# Patient Record
Sex: MALE | Race: BLACK OR AFRICAN AMERICAN | Employment: OTHER | URBAN - METROPOLITAN AREA
[De-identification: names, ages, dates, MRNs, and addresses within clinical notes are randomized per-mention and may not be internally consistent; named-entity substitution may affect disease eponyms.]

---

## 2017-02-03 ENCOUNTER — HOSPITAL ENCOUNTER (OUTPATIENT)
Dept: GENERAL RADIOLOGY | Age: 77
Discharge: HOME | End: 2017-02-03
Payer: COMMERCIAL

## 2017-02-03 DIAGNOSIS — R06.02 SHORTNESS OF BREATH: ICD-10-CM

## 2017-02-03 PROCEDURE — 71020 XR CHEST PA LAT: CPT

## 2017-02-14 ENCOUNTER — HOSPITAL ENCOUNTER (INPATIENT)
Age: 77
LOS: 4 days | Discharge: NURSING, REHAB OR LONG-TERM CARE | DRG: 603 | End: 2017-02-18
Attending: EMERGENCY MEDICINE | Admitting: INTERNAL MEDICINE
Payer: MEDICARE

## 2017-02-14 DIAGNOSIS — M79.89 SWELLING OF TOE OF RIGHT FOOT: ICD-10-CM

## 2017-02-14 DIAGNOSIS — Z71.89 GOALS OF CARE, COUNSELING/DISCUSSION: ICD-10-CM

## 2017-02-14 DIAGNOSIS — R53.1 WEAKNESS: ICD-10-CM

## 2017-02-14 DIAGNOSIS — L03.115 CELLULITIS OF RIGHT LOWER EXTREMITY: Primary | ICD-10-CM

## 2017-02-14 DIAGNOSIS — F41.9 ANXIETY: ICD-10-CM

## 2017-02-14 PROBLEM — N18.4 CKD (CHRONIC KIDNEY DISEASE) STAGE 4, GFR 15-29 ML/MIN (HCC): Chronic | Status: ACTIVE | Noted: 2017-02-14

## 2017-02-14 PROBLEM — L03.90 CELLULITIS: Status: ACTIVE | Noted: 2017-02-14

## 2017-02-14 LAB
ALBUMIN SERPL BCP-MCNC: 3.1 G/DL (ref 3.5–5)
ALBUMIN/GLOB SERPL: 0.7 {RATIO} (ref 1.1–2.2)
ALP SERPL-CCNC: 118 U/L (ref 45–117)
ALT SERPL-CCNC: 38 U/L (ref 12–78)
ANION GAP BLD CALC-SCNC: 12 MMOL/L (ref 5–15)
AST SERPL W P-5'-P-CCNC: 36 U/L (ref 15–37)
BASOPHILS # BLD AUTO: 0 K/UL (ref 0–0.1)
BASOPHILS # BLD: 0 % (ref 0–1)
BILIRUB SERPL-MCNC: 1.1 MG/DL (ref 0.2–1)
BUN SERPL-MCNC: 35 MG/DL (ref 6–20)
BUN/CREAT SERPL: 15 (ref 12–20)
CALCIUM SERPL-MCNC: 9 MG/DL (ref 8.5–10.1)
CHLORIDE SERPL-SCNC: 94 MMOL/L (ref 97–108)
CO2 SERPL-SCNC: 26 MMOL/L (ref 21–32)
CREAT SERPL-MCNC: 2.39 MG/DL (ref 0.7–1.3)
EOSINOPHIL # BLD: 0 K/UL (ref 0–0.4)
EOSINOPHIL NFR BLD: 0 % (ref 0–7)
ERYTHROCYTE [DISTWIDTH] IN BLOOD BY AUTOMATED COUNT: 14 % (ref 11.5–14.5)
GLOBULIN SER CALC-MCNC: 4.6 G/DL (ref 2–4)
GLUCOSE BLD STRIP.AUTO-MCNC: 140 MG/DL (ref 65–100)
GLUCOSE BLD STRIP.AUTO-MCNC: 31 MG/DL (ref 65–100)
GLUCOSE BLD STRIP.AUTO-MCNC: 31 MG/DL (ref 65–100)
GLUCOSE BLD STRIP.AUTO-MCNC: 39 MG/DL (ref 65–100)
GLUCOSE SERPL-MCNC: 36 MG/DL (ref 65–100)
HCT VFR BLD AUTO: 34.3 % (ref 36.6–50.3)
HGB BLD-MCNC: 11.5 G/DL (ref 12.1–17)
LYMPHOCYTES # BLD AUTO: 12 % (ref 12–49)
LYMPHOCYTES # BLD: 1.1 K/UL (ref 0.8–3.5)
MCH RBC QN AUTO: 29.2 PG (ref 26–34)
MCHC RBC AUTO-ENTMCNC: 33.5 G/DL (ref 30–36.5)
MCV RBC AUTO: 87.1 FL (ref 80–99)
MONOCYTES # BLD: 1.1 K/UL (ref 0–1)
MONOCYTES NFR BLD AUTO: 11 % (ref 5–13)
NEUTS SEG # BLD: 7.6 K/UL (ref 1.8–8)
NEUTS SEG NFR BLD AUTO: 77 % (ref 32–75)
PLATELET # BLD AUTO: 354 K/UL (ref 150–400)
POTASSIUM SERPL-SCNC: 4.4 MMOL/L (ref 3.5–5.1)
PROT SERPL-MCNC: 7.7 G/DL (ref 6.4–8.2)
RBC # BLD AUTO: 3.94 M/UL (ref 4.1–5.7)
SERVICE CMNT-IMP: ABNORMAL
SODIUM SERPL-SCNC: 132 MMOL/L (ref 136–145)
WBC # BLD AUTO: 9.9 K/UL (ref 4.1–11.1)

## 2017-02-14 PROCEDURE — 93971 EXTREMITY STUDY: CPT

## 2017-02-14 PROCEDURE — 65270000029 HC RM PRIVATE

## 2017-02-14 PROCEDURE — 99285 EMERGENCY DEPT VISIT HI MDM: CPT

## 2017-02-14 PROCEDURE — 74011000250 HC RX REV CODE- 250: Performed by: EMERGENCY MEDICINE

## 2017-02-14 PROCEDURE — 74011250636 HC RX REV CODE- 250/636: Performed by: INTERNAL MEDICINE

## 2017-02-14 PROCEDURE — 36415 COLL VENOUS BLD VENIPUNCTURE: CPT | Performed by: EMERGENCY MEDICINE

## 2017-02-14 PROCEDURE — 82962 GLUCOSE BLOOD TEST: CPT

## 2017-02-14 PROCEDURE — 85025 COMPLETE CBC W/AUTO DIFF WBC: CPT | Performed by: EMERGENCY MEDICINE

## 2017-02-14 PROCEDURE — 80053 COMPREHEN METABOLIC PANEL: CPT | Performed by: EMERGENCY MEDICINE

## 2017-02-14 RX ORDER — GLIPIZIDE 2.5 MG/1
2.5 TABLET, EXTENDED RELEASE ORAL DAILY
COMMUNITY
End: 2017-02-18

## 2017-02-14 RX ORDER — SODIUM CHLORIDE 0.9 % (FLUSH) 0.9 %
5-10 SYRINGE (ML) INJECTION AS NEEDED
Status: DISCONTINUED | OUTPATIENT
Start: 2017-02-14 | End: 2017-02-18 | Stop reason: HOSPADM

## 2017-02-14 RX ORDER — INSULIN LISPRO 100 [IU]/ML
INJECTION, SOLUTION INTRAVENOUS; SUBCUTANEOUS
Status: DISCONTINUED | OUTPATIENT
Start: 2017-02-15 | End: 2017-02-18 | Stop reason: HOSPADM

## 2017-02-14 RX ORDER — MELATONIN
5000 DAILY
COMMUNITY

## 2017-02-14 RX ORDER — DEXTROSE 50 % IN WATER (D50W) INTRAVENOUS SYRINGE
12.5-25 AS NEEDED
Status: DISCONTINUED | OUTPATIENT
Start: 2017-02-14 | End: 2017-02-18 | Stop reason: HOSPADM

## 2017-02-14 RX ORDER — SODIUM CHLORIDE 0.9 % (FLUSH) 0.9 %
5-10 SYRINGE (ML) INJECTION EVERY 8 HOURS
Status: DISCONTINUED | OUTPATIENT
Start: 2017-02-14 | End: 2017-02-18 | Stop reason: HOSPADM

## 2017-02-14 RX ORDER — PROCHLORPERAZINE EDISYLATE 5 MG/ML
5 INJECTION INTRAMUSCULAR; INTRAVENOUS
Status: DISCONTINUED | OUTPATIENT
Start: 2017-02-14 | End: 2017-02-18 | Stop reason: HOSPADM

## 2017-02-14 RX ORDER — ZOLPIDEM TARTRATE 5 MG/1
5 TABLET ORAL
Status: DISCONTINUED | OUTPATIENT
Start: 2017-02-14 | End: 2017-02-18 | Stop reason: HOSPADM

## 2017-02-14 RX ORDER — DEXTROSE 50 % IN WATER (D50W) INTRAVENOUS SYRINGE
25
Status: COMPLETED | OUTPATIENT
Start: 2017-02-14 | End: 2017-02-14

## 2017-02-14 RX ORDER — ACETAMINOPHEN 325 MG/1
650 TABLET ORAL
Status: DISCONTINUED | OUTPATIENT
Start: 2017-02-14 | End: 2017-02-18 | Stop reason: HOSPADM

## 2017-02-14 RX ORDER — MORPHINE SULFATE 2 MG/ML
4 INJECTION, SOLUTION INTRAMUSCULAR; INTRAVENOUS
Status: DISCONTINUED | OUTPATIENT
Start: 2017-02-14 | End: 2017-02-14

## 2017-02-14 RX ORDER — MAGNESIUM SULFATE 100 %
4 CRYSTALS MISCELLANEOUS AS NEEDED
Status: DISCONTINUED | OUTPATIENT
Start: 2017-02-14 | End: 2017-02-18 | Stop reason: HOSPADM

## 2017-02-14 RX ORDER — HYDROMORPHONE HYDROCHLORIDE 1 MG/ML
0.5 INJECTION, SOLUTION INTRAMUSCULAR; INTRAVENOUS; SUBCUTANEOUS
Status: DISCONTINUED | OUTPATIENT
Start: 2017-02-14 | End: 2017-02-18 | Stop reason: HOSPADM

## 2017-02-14 RX ORDER — OXYCODONE AND ACETAMINOPHEN 5; 325 MG/1; MG/1
1 TABLET ORAL
Status: DISCONTINUED | OUTPATIENT
Start: 2017-02-14 | End: 2017-02-18 | Stop reason: HOSPADM

## 2017-02-14 RX ORDER — SODIUM CHLORIDE 9 MG/ML
75 INJECTION, SOLUTION INTRAVENOUS CONTINUOUS
Status: DISCONTINUED | OUTPATIENT
Start: 2017-02-14 | End: 2017-02-15

## 2017-02-14 RX ORDER — ENOXAPARIN SODIUM 100 MG/ML
40 INJECTION SUBCUTANEOUS EVERY 24 HOURS
Status: CANCELLED | OUTPATIENT
Start: 2017-02-14

## 2017-02-14 RX ORDER — HEPARIN SODIUM 5000 [USP'U]/ML
5000 INJECTION, SOLUTION INTRAVENOUS; SUBCUTANEOUS EVERY 8 HOURS
Status: DISCONTINUED | OUTPATIENT
Start: 2017-02-15 | End: 2017-02-18 | Stop reason: HOSPADM

## 2017-02-14 RX ADMIN — HEPARIN SODIUM 5000 UNITS: 5000 INJECTION, SOLUTION INTRAVENOUS; SUBCUTANEOUS at 23:52

## 2017-02-14 RX ADMIN — SODIUM CHLORIDE 75 ML/HR: 900 INJECTION, SOLUTION INTRAVENOUS at 23:52

## 2017-02-14 RX ADMIN — VANCOMYCIN HYDROCHLORIDE 2250 MG: 10 INJECTION, POWDER, LYOPHILIZED, FOR SOLUTION INTRAVENOUS at 22:50

## 2017-02-14 RX ADMIN — DEXTROSE MONOHYDRATE 25 G: 25 INJECTION, SOLUTION INTRAVENOUS at 23:08

## 2017-02-14 NOTE — IP AVS SNAPSHOT
Jesús Idol 
 
 
 [address] [facility] 
[phone] Patient: Jessica Rico MRN: [id number] Javier Gaona You are allergic to the following Allergen Reactions Albuterol Other (comments)  
 patient reports that albuterol gives him phlegm and makes it harder to breathe Contrast Agent (Iodine) Other (comments) \" Destroys kidneys\" Iodinated Contrast Media - Oral And Iv Dye Other (comments) \" Destroys kidneys\" Norvasc (Amlodipine) Shortness of Breath Pcn (Penicillins) Shortness of Breath Just started course of PCN and hydrocodone on Monday Recent Documentation Height Weight BMI Smoking Status 1.803 m 117.9 kg 36.26 kg/m2 Never Smoker Emergency Contacts Name Discharge Info Relation Home Work Mobile Irene Hebert   [phone] About your hospitalization You were admitted on:  February 14, 2017 You last received care in the:  [facility]  MED SURG 2 You were discharged on:  February 18, 2017 Unit phone number:  [phone] Why you were hospitalized Your primary diagnosis was:  Cellulitis Your diagnoses also included:  Cad (Coronary Artery Disease), Pure Hypercholesterolemia, Jonathan (Obstructive Sleep Apnea), Obesity, Htn (Hypertension), Dm Type 2 Causing Renal Disease (Hcc), Ckd (Chronic Kidney Disease) Stage 4, Gfr 15-29 Ml/Min (Hcc), Dementia, Anxiety, Aortic Stenosis, Pulmonary Hypertension (Hcc) Providers Seen During Your Hospitalizations Provider Role Specialty Primary office phone Kelli Baxter MD Attending Provider Emergency Medicine [phone] July Lee MD Attending Provider Internal Medicine [phone] Steve Ramsey MD Attending Provider Internal Medicine [phone] Your Primary Care Physician (PCP) Primary Care Physician Office Phone Office Fax Jahaira Lopez [phone] Follow-up Information Follow up With Details Comments Contact Info Seven Merrill MD   \[facility] [place] [facility]\"" [address] 
[phone] Dori Saint, DPM Schedule an appointment as soon as possible for a visit As needed [facility] [address] [id number] 
[phone] Current Discharge Medication List  
  
START taking these medications Dose & Instructions Dispensing Information Comments Morning Noon Evening Bedtime  
 carvedilol 12.5 mg tablet Commonly known as:  Mar Summerfield Your next dose is: Today, Tomorrow Other:  _________ Dose:  12.5 mg Take 1 Tab by mouth two (2) times daily (with meals) for 90 days. Quantity:  60 Tab Refills:  2  
     
   
   
   
  
 cephALEXin 500 mg capsule Commonly known as:  Rosalia Slot Your next dose is: Today, Tomorrow Other:  _________ Dose:  500 mg Take 1 Cap by mouth four (4) times daily for 10 days. Quantity:  40 Cap Refills:  0  
     
   
   
   
  
 linagliptin 5 mg tablet Commonly known as:  Eve Chapa Your next dose is: Today, Tomorrow Other:  _________ Dose:  5 mg Take 1 Tab by mouth Daily (before breakfast). Quantity:  30 Tab Refills:  0  
     
   
   
   
  
 lisinopril 2.5 mg tablet Commonly known as:  Dayanara Gratis Your next dose is: Today, Tomorrow Other:  _________ Dose:  2.5 mg Take 1 Tab by mouth daily. Indications: DIABETIC NEPHROPATHY Quantity:  30 Tab Refills:  0  
     
   
   
   
  
 oxyCODONE-acetaminophen 5-325 mg per tablet Commonly known as:  PERCOCET Your next dose is: Today, Tomorrow Other:  _________ Dose:  1 Tab Take 1 Tab by mouth every four (4) hours as needed. Max Daily Amount: 6 Tabs. Quantity:  15 Tab Refills:  0 CONTINUE these medications which have NOT CHANGED Dose & Instructions Dispensing Information Comments Morning Noon Evening Bedtime  
 aspirin delayed-release 81 mg tablet Your next dose is: Today, Tomorrow Other:  _________ Dose:  81 mg Take 81 mg by mouth daily. Refills:  0  
     
   
   
   
  
 cholecalciferol 1,000 unit tablet Commonly known as:  VITAMIN D3 Your next dose is: Today, Tomorrow Other:  _________ Dose:  5000 Units Take 5,000 Units by mouth daily. Refills:  0  
     
   
   
   
  
 COQ10 (UBIQUINOL) PO Your next dose is: Today, Tomorrow Other:  _________ Dose:  60 mg Take 60 mg by mouth daily. Refills:  0  
     
   
   
   
  
 cyanocobalamin 1,000 mcg tablet Your next dose is: Today, Tomorrow Other:  _________ Dose:  1000 mcg Take 1,000 mcg by mouth daily. Refills:  0  
     
   
   
   
  
 FOLIC ACID PO Your next dose is: Today, Tomorrow Other:  _________ Dose:  400 mcg Take 400 mcg by mouth. Refills:  0 MAGNESIUM GLYCINATE PO Your next dose is: Today, Tomorrow Other:  _________ Dose:  266 mg Take 266 mg by mouth daily. Refills:  0 STOP taking these medications   
 glipiZIDE SR 2.5 mg CR tablet Commonly known as:  GLUCOTROL XL Where to Get Your Medications Information on where to get these meds will be given to you by the nurse or doctor. ! Ask your nurse or doctor about these medications  
  carvedilol 12.5 mg tablet  
 cephALEXin 500 mg capsule  
 linagliptin 5 mg tablet  
 lisinopril 2.5 mg tablet  
 oxyCODONE-acetaminophen 5-325 mg per tablet Discharge Instructions HOSPITALIST DISCHARGE INSTRUCTIONS 
NAME: Jazmine Lim. :  1940 MRN:  [id number] Date/Time:  2017 10:23 AM 
 
 ADMIT DATE: 2/14/2017 DISCHARGE DATE: 2/18/2017 ADMITTING DIAGNOSIS: 
Cellulitis; RLE swelling (negative doppler for DVT. Negative MRI for abscess) DISCHARGE DIAGNOSIS: 
As above MEDICATIONS: 
  
· It is important that you take the medication exactly as they are prescribed. · Keep your medication in the bottles provided by the pharmacist and keep a list of the medication names, dosages, and times to be taken in your wallet. · Do not take other medications without consulting your doctor. Pain Management: per above medications What to do at [facility] Recommended diet:  Cardiac Diet and Diabetic Diet Recommended activity: Activity as tolerated. When not ambulating please keep RLE elevated If you experience any of the following symptoms then please call your primary care physician or return to the emergency room if you cannot get hold of your doctor: 
Fever, chills, nausea, vomiting, diarrhea, change in mentation, falling, bleeding, shortness of breath, chest pain Follow Up: 
Dr. Homar Mercado MD  you are to call and set up an appointment to see them in 1-2 weeks as needed Information obtained by : 
I understand that if any problems occur once I am at home I am to contact my physician. I understand and acknowledge receipt of the instructions indicated above. Physician's or R.N.'s Signature                                                                  Date/Time Patient or Representative Signature                                                          Date/Time Discharge Orders None MyChart Announcement  We are excited to announce that we are making your provider's discharge notes available to you in Clover. You will see these notes when they are completed and signed by the physician that discharged you from your recent hospital stay. If you have any questions or concerns about any information you see in Clover, please call the Health Information Department where you were seen or reach out to your Primary Care Provider for more information about your plan of care. Introducing [facility]! [facility] introduces Clover patient portal. Now you can access parts of your medical record, email your doctor's office, and request medication refills online. 1. In your internet browser, go to [email or web address] 2. Click on the First Time User? Click Here link in the Sign In box. You will see the New Member Sign Up page. 3. Enter your Clover Access Code exactly as it appears below. You will not need to use this code after youve completed the sign-up process. If you do not sign up before the expiration date, you must request a new code. · Clover Access Code: ILAN ISRAEL Datil- Expires: 5/4/2017 11:05 AM 
 
4. Enter the last four digits of your Social Security Number (xxxx) and Date of Birth (mm/dd/yyyy) as indicated and click Submit. You will be taken to the next sign-up page. 5. Create a Clover ID. This will be your Clover login ID and cannot be changed, so think of one that is secure and easy to remember. 6. Create a Clover password. You can change your password at any time. 7. Enter your Password Reset Question and Answer. This can be used at a later time if you forget your password. 8. Enter your e-mail address. You will receive e-mail notification when new information is available in [address] 9. Click Sign Up. You can now view and download portions of your medical record. 10. Click the Download Summary menu link to download a portable copy of your medical information. If you have questions, please visit the Frequently Asked Questions section of the MyChart website. Remember, MyChart is NOT to be used for urgent needs. For medical emergencies, dial 911. Now available from your iPhone and Android! General Information Please provide this summary of care documentation to your next provider. Patient Signature:  ____________________________________________________________ Date:  ____________________________________________________________  
  
Christiano Lancaster Provider Signature:  ____________________________________________________________ Date:  ____________________________________________________________

## 2017-02-14 NOTE — IP AVS SNAPSHOT
Current Discharge Medication List  
  
Take these medications at their scheduled times Dose & Instructions Dispensing Information Comments Morning Noon Evening Bedtime  
 aspirin delayed-release 81 mg tablet Your next dose is: Today, Tomorrow Other:  ____________ Dose:  81 mg Take 81 mg by mouth daily. Refills:  0  
     
   
   
   
  
 carvedilol 12.5 mg tablet Commonly known as:  Ana Dome Your next dose is: Today, Tomorrow Other:  ____________ Dose:  12.5 mg Take 1 Tab by mouth two (2) times daily (with meals) for 90 days. Quantity:  60 Tab Refills:  2  
     
   
   
   
  
 cephALEXin 500 mg capsule Commonly known as:  Dene Decree Your next dose is: Today, Tomorrow Other:  ____________ Dose:  500 mg Take 1 Cap by mouth four (4) times daily for 10 days. Quantity:  40 Cap Refills:  0  
     
   
   
   
  
 cholecalciferol 1,000 unit tablet Commonly known as:  VITAMIN D3 Your next dose is: Today, Tomorrow Other:  ____________ Dose:  5000 Units Take 5,000 Units by mouth daily. Refills:  0  
     
   
   
   
  
 COQ10 (UBIQUINOL) PO Your next dose is: Today, Tomorrow Other:  ____________ Dose:  60 mg Take 60 mg by mouth daily. Refills:  0  
     
   
   
   
  
 cyanocobalamin 1,000 mcg tablet Your next dose is: Today, Tomorrow Other:  ____________ Dose:  1000 mcg Take 1,000 mcg by mouth daily. Refills:  0  
     
   
   
   
  
 linagliptin 5 mg tablet Commonly known as:  Sathya Nissen Your next dose is: Today, Tomorrow Other:  ____________ Dose:  5 mg Take 1 Tab by mouth Daily (before breakfast). Quantity:  30 Tab Refills:  0  
     
   
   
   
  
 lisinopril 2.5 mg tablet Commonly known as:  Elizabeth Cisneros Your next dose is: Today, Tomorrow Other:  ____________ Dose:  2.5 mg Take 1 Tab by mouth daily. Indications: DIABETIC NEPHROPATHY Quantity:  30 Tab Refills:  0 MAGNESIUM GLYCINATE PO Your next dose is: Today, Tomorrow Other:  ____________ Dose:  266 mg Take 266 mg by mouth daily. Refills:  0 Take these medications as needed Dose & Instructions Dispensing Information Comments Morning Noon Evening Bedtime  
 oxyCODONE-acetaminophen 5-325 mg per tablet Commonly known as:  PERCOCET Your next dose is: Today, Tomorrow Other:  ____________ Dose:  1 Tab Take 1 Tab by mouth every four (4) hours as needed. Max Daily Amount: 6 Tabs. Quantity:  15 Tab Refills:  0 Take these medications as directed Dose & Instructions Dispensing Information Comments Morning Noon Evening Bedtime FOLIC ACID PO Your next dose is: Today, Tomorrow Other:  ____________ Dose:  400 mcg Take 400 mcg by mouth. Refills:  0 Where to Get Your Medications Information about where to get these medications is not yet available ! Ask your nurse or doctor about these medications  
  carvedilol 12.5 mg tablet  
 cephALEXin 500 mg capsule  
 linagliptin 5 mg tablet  
 lisinopril 2.5 mg tablet  
 oxyCODONE-acetaminophen 5-325 mg per tablet

## 2017-02-15 PROBLEM — F41.9 ANXIETY: Status: ACTIVE | Noted: 2017-02-15

## 2017-02-15 PROBLEM — F03.90 DEMENTIA (HCC): Status: ACTIVE | Noted: 2017-02-15

## 2017-02-15 LAB
ANION GAP BLD CALC-SCNC: 8 MMOL/L (ref 5–15)
BUN SERPL-MCNC: 35 MG/DL (ref 6–20)
BUN/CREAT SERPL: 15 (ref 12–20)
CALCIUM SERPL-MCNC: 8.6 MG/DL (ref 8.5–10.1)
CHLORIDE SERPL-SCNC: 96 MMOL/L (ref 97–108)
CO2 SERPL-SCNC: 28 MMOL/L (ref 21–32)
CREAT SERPL-MCNC: 2.33 MG/DL (ref 0.7–1.3)
ERYTHROCYTE [DISTWIDTH] IN BLOOD BY AUTOMATED COUNT: 13.9 % (ref 11.5–14.5)
EST. AVERAGE GLUCOSE BLD GHB EST-MCNC: 166 MG/DL
GLUCOSE BLD STRIP.AUTO-MCNC: 100 MG/DL (ref 65–100)
GLUCOSE BLD STRIP.AUTO-MCNC: 134 MG/DL (ref 65–100)
GLUCOSE BLD STRIP.AUTO-MCNC: 199 MG/DL (ref 65–100)
GLUCOSE BLD STRIP.AUTO-MCNC: 51 MG/DL (ref 65–100)
GLUCOSE BLD STRIP.AUTO-MCNC: 70 MG/DL (ref 65–100)
GLUCOSE BLD STRIP.AUTO-MCNC: 76 MG/DL (ref 65–100)
GLUCOSE BLD STRIP.AUTO-MCNC: 89 MG/DL (ref 65–100)
GLUCOSE SERPL-MCNC: 67 MG/DL (ref 65–100)
HBA1C MFR BLD: 7.4 % (ref 4.2–6.3)
HCT VFR BLD AUTO: 30.7 % (ref 36.6–50.3)
HGB BLD-MCNC: 10.4 G/DL (ref 12.1–17)
MAGNESIUM SERPL-MCNC: 2.1 MG/DL (ref 1.6–2.4)
MCH RBC QN AUTO: 29.3 PG (ref 26–34)
MCHC RBC AUTO-ENTMCNC: 33.9 G/DL (ref 30–36.5)
MCV RBC AUTO: 86.5 FL (ref 80–99)
PHOSPHATE SERPL-MCNC: 3.5 MG/DL (ref 2.6–4.7)
PLATELET # BLD AUTO: 309 K/UL (ref 150–400)
POTASSIUM SERPL-SCNC: 3.9 MMOL/L (ref 3.5–5.1)
RBC # BLD AUTO: 3.55 M/UL (ref 4.1–5.7)
SERVICE CMNT-IMP: ABNORMAL
SERVICE CMNT-IMP: NORMAL
SODIUM SERPL-SCNC: 132 MMOL/L (ref 136–145)
WBC # BLD AUTO: 7.5 K/UL (ref 4.1–11.1)

## 2017-02-15 PROCEDURE — 65270000029 HC RM PRIVATE

## 2017-02-15 PROCEDURE — 74011250637 HC RX REV CODE- 250/637: Performed by: INTERNAL MEDICINE

## 2017-02-15 PROCEDURE — 93306 TTE W/DOPPLER COMPLETE: CPT

## 2017-02-15 PROCEDURE — 36415 COLL VENOUS BLD VENIPUNCTURE: CPT | Performed by: INTERNAL MEDICINE

## 2017-02-15 PROCEDURE — 74011250636 HC RX REV CODE- 250/636: Performed by: INTERNAL MEDICINE

## 2017-02-15 PROCEDURE — 82962 GLUCOSE BLOOD TEST: CPT

## 2017-02-15 PROCEDURE — 80048 BASIC METABOLIC PNL TOTAL CA: CPT | Performed by: INTERNAL MEDICINE

## 2017-02-15 PROCEDURE — 84100 ASSAY OF PHOSPHORUS: CPT | Performed by: INTERNAL MEDICINE

## 2017-02-15 PROCEDURE — 76450000000

## 2017-02-15 PROCEDURE — 83735 ASSAY OF MAGNESIUM: CPT | Performed by: INTERNAL MEDICINE

## 2017-02-15 PROCEDURE — 83036 HEMOGLOBIN GLYCOSYLATED A1C: CPT | Performed by: INTERNAL MEDICINE

## 2017-02-15 PROCEDURE — 85027 COMPLETE CBC AUTOMATED: CPT | Performed by: INTERNAL MEDICINE

## 2017-02-15 RX ORDER — AMLODIPINE BESYLATE 5 MG/1
10 TABLET ORAL DAILY
Status: DISCONTINUED | OUTPATIENT
Start: 2017-02-15 | End: 2017-02-16

## 2017-02-15 RX ADMIN — Medication 10 ML: at 22:28

## 2017-02-15 RX ADMIN — HEPARIN SODIUM 5000 UNITS: 5000 INJECTION, SOLUTION INTRAVENOUS; SUBCUTANEOUS at 09:18

## 2017-02-15 RX ADMIN — VANCOMYCIN HYDROCHLORIDE 1750 MG: 10 INJECTION, POWDER, LYOPHILIZED, FOR SOLUTION INTRAVENOUS at 22:32

## 2017-02-15 RX ADMIN — HEPARIN SODIUM 5000 UNITS: 5000 INJECTION, SOLUTION INTRAVENOUS; SUBCUTANEOUS at 17:57

## 2017-02-15 RX ADMIN — Medication 10 ML: at 12:24

## 2017-02-15 NOTE — PROGRESS NOTES
2/15/2017   CARE MANAGEMENT NOTE:  CM is following pt in the ER for initial discharge planning. EMR reviewed. CM met with pt who was his own historian for this needs assessment. Pt presents quite talkative providing more information in response to my interview questions and he often became sidetracked. Reportedly, pt listed his son's one story home and address on the demographic sheet and also receives his mail at that location. However, pt states that he is currently residing in a one story home with 12 steps that is one block away from his son's home. The address is:  [address] [place] [address], pt was ambulatory, indepn with ADLs, and drives. 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 Pt states that he maintains a healthy diet eating foods (fish and broccoli) separate from what is prepared at home. He does not have home healthcare nor DME for home use. Pt has a glucomter. PCP is Dr. Reggie Mcelroy although he has plans to change providers. Plan is to return home when medically stable. \"I probably didn't answer all of your questions. \"  BENNY Farley

## 2017-02-15 NOTE — PROGRESS NOTES
Bedside and Verbal shift change report given to Alla Rivera RN (oncoming nurse) by Roshni Azevedo RN (offgoing nurse). Report included the following information SBAR, Kardex, ED Summary, Procedure Summary, Intake/Output and MAR. Patient had low blood sugar this morning, treated with juice     Refused norvasc this morning stated that \"it almost killed him the last time he took it long time ago\". TRANSFER - OUT REPORT:    Verbal report given to Raymond Sarah RN(name) on [facility].  being transferred to [facility](unit) for routine progression of care       Report consisted of patients Situation, Background, Assessment and   Recommendations(SBAR). Information from the following report(s) SBAR, Kardex, ED Summary, Procedure Summary, Intake/Output and MAR was reviewed with the receiving nurse. Lines:   Peripheral IV 02/14/17 Right Antecubital (Active)   Site Assessment Clean, dry, & intact 2/15/2017  1:00 AM   Phlebitis Assessment 0 2/15/2017  1:00 AM   Infiltration Assessment 0 2/15/2017  1:00 AM   Dressing Status Clean, dry, & intact 2/15/2017  1:00 AM   Dressing Type Transparent;Tape 2/15/2017  1:00 AM   Hub Color/Line Status Infusing 2/15/2017  1:00 AM        Opportunity for questions and clarification was provided.       Patient transported with:   [employer]

## 2017-02-15 NOTE — DIABETES MGMT
DTC:   Discussed case with Cheng Worthington. EUGENIE Rowley. Noted endocrinology following. Given that MrCristobal Bland is > 79year old, glipizide sr may be the best choice for a sulfonylurea as it gives a basal coverage. Would strongly suggest switching from a sulfonylyrea to a DPP-4 inhibitor, such as Januvia, as this would decrease the risk of hypoglycemia, and can be taken regardless of consistency of po intake. Will continue to follow as needed. Jesus Riley.  REN Naranjo, [address] [place] [address]   [phone] (office)  [phone] (pager)

## 2017-02-15 NOTE — ED NOTES
Bedside shift change report given to Khadar Grande (oncoming nurse) by Robyn 1850 (offgoing nurse). Report included the following information SBAR, ED Summary, MAR and Recent Results. Receiving RN aware of pt episode of low BS and most recent recheck WNL.

## 2017-02-15 NOTE — CONSULTS
Renal Note:  Patient known from previously. CKD - surprisingly stable considering his overall status. Will resume norvasc for his blood pressure. Not sure why he is not on ACE other than in the past he has had some problems with hyperkalemia in the past.    Will also check bladder scan and monitor.

## 2017-02-15 NOTE — ED NOTES
Bedside and Verbal shift change report [facility] ([facility] nurse) by Larry Skelton RN (offgoing nurse). Report included the following information SBAR, Kardex, MAR and Recent Results.

## 2017-02-15 NOTE — PROGRESS NOTES
Primary Nurse Yossi Garcia RN and SN Millicent performed a dual skin assessment on this patient No impairment noted  Jamil score is 17

## 2017-02-15 NOTE — ED NOTES
BS 39, 4 oz juice given, IV access obtained by Dr. Vern Natarajan. Dr Olga Albright notified of continued low BS, orders to give IV D50%.

## 2017-02-15 NOTE — PROGRESS NOTES
[facility] Dosing Services: 2/14/17     Consult for Vancomycin by Dr. Martha Maldonado  Pharmacist reviewed antibiotic appropriateness for  78y.o. year old ,male for indication of RLE cellulitis  Day of Therapy 1     Current Antimicrobial Therapy:  Vancomycin 2250mg IVPB x1 . Maintenance dose: 1750 mg IV every 24 hours  Goad trough level: 10-25  Check trough before 3rd dose    Previous Dosing Regimen N/a   Other Current Antibiotics N/a   Serum Creatinine/ BUN Lab Results   Component Value Date/Time    Creatinine 3.89 12/11/2015 11:24 AM    Creatinine (POC) 4.1 05/01/2011 01:16 PM       Creatinine Clearance CrCl cannot be calculated (Patient has no serum creatinine result on file. ).    Temp 97.4 °F (36.3 °C)    WBC Lab Results   Component Value Date/Time    WBC 9.9 02/14/2017 09:27 PM      H/H Lab Results   Component Value Date/Time    HGB 11.5 02/14/2017 09:27 PM      Platelets Lab Results   Component Value Date/Time    PLATELET 174 09/08/8207 09:27 PM        Significant Cultures MRSA Cx pending      Pharmacist Russell Puente PHARMD  Contact information: [phone]

## 2017-02-15 NOTE — CONSULTS
[facility] Consult    Subjective:     Cory Sanders is a 78 y.o.  male who is being followed for Type 2 diabetes and significant hypoglycemia. Appreciate this consult. HPI: 79 yo AA male with HTN, CAD, Type 2 Diabetes Mellitus, and CKD admitted on 2/14/17 for R foot pain associated with redness and swelling. He is being treated with empiric vancomycin for a probable cellulitis. Patient has a history of Type 2 Diabetes and his home medication regimen prior to admission was: glyburide 5 mg bid. Today patient tells me that he has had diabetes \"for 48 years\" and that he has been on the glyburide \"off and on\" for many years. He reports that he has had significant hypoglycemia with the glyburide in the past and therefore it was discontinued about 1.5 years ago when his previous PCP retired. Since then, he reports \"mostly staying off of it\" until recently \"when I went to some banquets. ..grandson graduated and 55th high school reunion\"; states that his blood sugars were higher so he restarted the glyburide last week. He reports that he has since improved his diet, but does admit that he did not eat prior to coming to the hospital on the day that he was admitted (2/14/17). Chart reviewed, including blood sugars. A1c 7.4%. Creatinine 2.33. Last night he had several severe hypoglycemic episodes. Several blood sugar readings in the 30s were treated with 4 oz juice, and eventually IV access was obtained and pt was treated with D50 - with improvement in blood sugar to 140 when checked about 30 minutes later. Sulfonylurea is being held and he is on high-sensitivity correction Humalog only. Blood sugars today are: 67 (0510), 51 (0801), 70 (0278), 134 (9439). He is on a regular cardiac diet 2000 kcal and has been eating meals since admitted.       Past Medical History   Diagnosis Date    CAD (coronary artery disease)     CKD (chronic kidney disease) stage 4, GFR 15-29 ml/min ([facility]) 2/14/2017    Diabetes ([facility] [address]     Hypertension       Past Surgical History   Procedure Laterality Date    Pr cardiac surg procedure unlist       cardiac cath without stents x3     The patient has a family history of  shoulder  Current Facility-Administered Medications   Medication Dose Route Frequency    sodium chloride (NS) flush 5-10 mL  5-10 mL IntraVENous Q8H    sodium chloride (NS) flush 5-10 mL  5-10 mL IntraVENous PRN    acetaminophen (TYLENOL) tablet 650 mg  650 mg Oral Q4H PRN    oxyCODONE-acetaminophen (PERCOCET) 5-325 mg per tablet 1 Tab  1 Tab Oral Q4H PRN    HYDROmorphone (PF) (DILAUDID) injection 0.5 mg  0.5 mg IntraVENous Q4H PRN    zolpidem (AMBIEN) tablet 5 mg  5 mg Oral QHS PRN    insulin lispro (HUMALOG) injection   SubCUTAneous AC&HS    glucose chewable tablet 16 g  4 Tab Oral PRN    dextrose (D50W) injection syrg 12.5-25 g  12.5-25 g IntraVENous PRN    glucagon (GLUCAGEN) injection 1 mg  1 mg IntraMUSCular PRN    heparin (porcine) injection 5,000 Units  5,000 Units SubCUTAneous Q8H    vancomycin (VANCOCIN) 1,750 mg in 0.9% sodium chloride 500 mL IVPB  1,750 mg IntraVENous Q24H    prochlorperazine (COMPAZINE) injection 5 mg  5 mg IntraVENous Q6H PRN     Current Outpatient Prescriptions   Medication Sig    glipiZIDE SR (GLUCOTROL XL) 2.5 mg CR tablet Take 2.5 mg by mouth daily.  MAGNESIUM GLYCINATE PO Take 266 mg by mouth daily.  COQ10, UBIQUINOL, PO Take 60 mg by mouth daily.  cholecalciferol (VITAMIN D3) 1,000 unit tablet Take 5,000 Units by mouth daily.  cyanocobalamin 1,000 mcg tablet Take 1,000 mcg by mouth daily.  FOLIC ACID PO Take 586 mcg by mouth.  aspirin delayed-release 81 mg tablet Take 81 mg by mouth daily.       Allergies   Allergen Reactions    Albuterol Other (comments)     patient reports that albuterol gives him phlegm and makes it harder to breathe    Pcn [Penicillins] Shortness of Breath     Just started course of PCN and hydrocodone on Monday        Review of Systems:  Pertinent items are noted in the History of Present Illness. Objective:     Vitals:   Visit Vitals    /69 (BP 1 Location: Left arm, BP Patient Position: At rest)    Pulse 85    Temp 98 °F (36.7 °C)    Resp 18    Ht 5' 11\" (1.803 m)    Wt 117.9 kg (260 lb)    SpO2 96%    BMI 36.26 kg/m2       Physical Exam:   He appears well, alert and oriented x 3, pleasant and cooperative. Vitals as noted. Cranial nerves and fundi normal. Lungs are clear to auscultation. Heart sounds are normal, no murmurs, clicks, gallops or rubs. LE 2+ pitting edema L LE and 3+ pitting edema R LE and redness/warmth of R LE. Data Review:   Recent Results (from the past 24 hour(s))   CBC WITH AUTOMATED DIFF    Collection Time: 02/14/17  9:27 PM   Result Value Ref Range    WBC 9.9 4.1 - 11.1 K/uL    RBC 3.94 (L) 4.10 - 5.70 M/uL    HGB 11.5 (L) 12.1 - 17.0 g/dL    HCT 34.3 (L) 36.6 - 50.3 %    MCV 87.1 80.0 - 99.0 FL    MCH 29.2 26.0 - 34.0 PG    MCHC 33.5 30.0 - 36.5 g/dL    RDW 14.0 11.5 - 14.5 %    PLATELET 145 186 - 349 K/uL    NEUTROPHILS 77 (H) 32 - 75 %    LYMPHOCYTES 12 12 - 49 %    MONOCYTES 11 5 - 13 %    EOSINOPHILS 0 0 - 7 %    BASOPHILS 0 0 - 1 %    ABS. NEUTROPHILS 7.6 1.8 - 8.0 K/UL    ABS. LYMPHOCYTES 1.1 0.8 - 3.5 K/UL    ABS. MONOCYTES 1.1 (H) 0.0 - 1.0 K/UL    ABS. EOSINOPHILS 0.0 0.0 - 0.4 K/UL    ABS.  BASOPHILS 0.0 0.0 - 0.1 K/UL   METABOLIC PANEL, COMPREHENSIVE    Collection Time: 02/14/17  9:27 PM   Result Value Ref Range    Sodium 132 (L) 136 - 145 mmol/L    Potassium 4.4 3.5 - 5.1 mmol/L    Chloride 94 (L) 97 - 108 mmol/L    CO2 26 21 - 32 mmol/L    Anion gap 12 5 - 15 mmol/L    Glucose 36 (LL) 65 - 100 mg/dL    BUN 35 (H) 6 - 20 MG/DL    Creatinine 2.39 (H) 0.70 - 1.30 MG/DL    BUN/Creatinine ratio 15 12 - 20      GFR est AA 32 (L) >60 ml/min/1.73m2    GFR est non-AA 27 (L) >60 ml/min/1.73m2    Calcium 9.0 8.5 - 10.1 MG/DL    Bilirubin, total 1.1 (H) 0.2 - 1.0 MG/DL    ALT (SGPT) 38 12 - 78 U/L    AST (SGOT) 36 15 - 37 U/L    Alk.  phosphatase 118 (H) 45 - 117 U/L    Protein, total 7.7 6.4 - 8.2 g/dL    Albumin 3.1 (L) 3.5 - 5.0 g/dL    Globulin 4.6 (H) 2.0 - 4.0 g/dL    A-G Ratio 0.7 (L) 1.1 - 2.2     GLUCOSE, POC    Collection Time: 02/14/17 10:20 PM   Result Value Ref Range    Glucose (POC) 31 (LL) 65 - 100 mg/dL    Performed by [facility], POC    Collection Time: 02/14/17 10:40 PM   Result Value Ref Range    Glucose (POC) 31 (LL) 65 - 100 mg/dL    Performed by [address] [place] [address], POC    Collection Time: 02/14/17 10:57 PM   Result Value Ref Range    Glucose (POC) 39 (LL) 65 - 100 mg/dL    Performed by Kenya Mead    GLUCOSE, POC    Collection Time: 02/14/17 11:27 PM   Result Value Ref Range    Glucose (POC) 140 (H) 65 - 100 mg/dL    Performed by Kenya Mead    CBC W/O DIFF    Collection Time: 02/15/17  5:10 AM   Result Value Ref Range    WBC 7.5 4.1 - 11.1 K/uL    RBC 3.55 (L) 4.10 - 5.70 M/uL    HGB 10.4 (L) 12.1 - 17.0 g/dL    HCT 30.7 (L) 36.6 - 50.3 %    MCV 86.5 80.0 - 99.0 FL    MCH 29.3 26.0 - 34.0 PG    MCHC 33.9 30.0 - 36.5 g/dL    RDW 13.9 11.5 - 14.5 %    PLATELET 717 315 - 606 K/uL   MAGNESIUM    Collection Time: 02/15/17  5:10 AM   Result Value Ref Range    Magnesium 2.1 1.6 - 2.4 mg/dL   PHOSPHORUS    Collection Time: 02/15/17  5:10 AM   Result Value Ref Range    Phosphorus 3.5 2.6 - 4.7 MG/DL   METABOLIC PANEL, BASIC    Collection Time: 02/15/17  5:10 AM   Result Value Ref Range    Sodium 132 (L) 136 - 145 mmol/L    Potassium 3.9 3.5 - 5.1 mmol/L    Chloride 96 (L) 97 - 108 mmol/L    CO2 28 21 - 32 mmol/L    Anion gap 8 5 - 15 mmol/L    Glucose 67 65 - 100 mg/dL    BUN 35 (H) 6 - 20 MG/DL    Creatinine 2.33 (H) 0.70 - 1.30 MG/DL    BUN/Creatinine ratio 15 12 - 20      GFR est AA 33 (L) >60 ml/min/1.73m2    GFR est non-AA 28 (L) >60 ml/min/1.73m2    Calcium 8.6 8.5 - 10.1 MG/DL   HEMOGLOBIN A1C WITH EAG Collection Time: 02/15/17  5:10 AM   Result Value Ref Range    Hemoglobin A1c 7.4 (H) 4.2 - 6.3 %    Est. average glucose 166 mg/dL   GLUCOSE, POC    Collection Time: 02/15/17  8:01 AM   Result Value Ref Range    Glucose (POC) 51 (L) 65 - 100 mg/dL    Performed by Latoya Trujillo (PCT)    GLUCOSE, POC    Collection Time: 02/15/17  8:46 AM   Result Value Ref Range    Glucose (POC) 70 65 - 100 mg/dL    Performed by [place]    GLUCOSE, POC    Collection Time: 02/15/17  9:17 AM   Result Value Ref Range    Glucose (POC) 134 (H) 65 - 100 mg/dL    Performed by [place]          Assessment:   Type 2 Diabetes Mellitus  Hypoglycemia - severe    Plan:  Plan:   77 yo AA male with HTN, CAD, Type 2 Diabetes Mellitus, and CKD admitted for R foot cellulitis. He has a history of T2DM, which (given his age, risk of hypoglycemia, and multiple co-morbidities) is fairly well-controlled with an A1c of 7.4% on 2/15/17. Several recent severe episodes of hypoglycemia likely related to poor PO intake yesterday on a sulfonylurea. There is some discrepancy about which sulfonylurea he is taking - patient states glyburide but home medication list states glipizide SR. Glyburide has greatest risk of hypoglycemia and longest half life (about 72 hr) of the sulfonylureas. Either way, agree with continued holding and (ultimately) discontinuance of his sulfonylurea. Continue high-sensitivity correction lispro and monitor blood sugars closely. Continue to follow hospital protocol for hypoglycemia: administer 1 ampule D50 if BS < 54 mg/dL followed by infusion of glucose to maintain blood glucose > 100 mg/dL; if conscious, may be given readily absorbable carbohydrates (i.e. 4 oz juice or glucose tablets). Patient counseled on signs/symptoms/risks/management of hypoglycemia; advised to always check blood sugar prior to driving. Recommendations for out-patient treatment post-discharge:   It would be best to avoid all sulfonylureas (especially glyburide with its long half-life) but glipizide XL 2.5 mg daily would be the safest option if a sulfonylurea is his best option (due to other factors, i.e. Cost, CKD). The best option would be Tradjenta 5 mg daily if insurance will cover. A GLP-1 agent (such as Victoza) could also be considered. Patient has been on insulin in the past - this is an option but not preferable with increased risk for weight gain and hypoglycemia. Advised close follow-up with his PCP (within 1 week of discharge) and Dr. Nick Pena, nephrology. Plan discussed with Dr. Lucina Moe.     Signed By: DEMETRI Go     February 15, 2017

## 2017-02-15 NOTE — CONSULTS
Name: Manolo Donohue. Hospital: [facility] [address]   : 1940 Admit Date: 2017   Phone: [phone]  Room: [place] [address]   PCP: Chetna Francisco MD  MRN: [id number]   Date: 2/15/2017  Code: Full Code        HPI:    Chart and notes reviewed. Data reviewed. I review the patient's current medications in the medical record at each encounter. I have evaluated and examined the patient. 12:09 PM       History was obtained from patient. History of Present Illness:    Mr. Sapna Paz is a 78 y.o. M that presents to the ED for right lower extremity swelling. He has a history of asthma, environmental allergies, PABLO, GERD, DM, CKD, and HTN. He is a lifetime non smoker, but has had second hand smoke exposure in the past at his job and home. He is followed by Dr. Robert Valentine and was last seen on 2/3/17 and suggested starting him on Breo. Mr. Sapna Paz states that he has inhalers at home and has been tried on many different ones, but they make his breathing worse. So, he is not on a current regimen. He states that coffee is what he uses when he feels SOB to decrease his symptoms. He used CPAP a few years ago, but this continuously caused him to get sick despite cleaning of supplies and trying different machines, so he stopped using. He denies SOB and wheezing at this time. He has a cough with clear sputum production which is at baseline right now.  He states his cough will continue to get worse due to the air that is in the hospital.     Images:  Right lower extremity doppler 2/15/17 negative  CXR 2/3/17 showed clear lungs and normal chest      Past Medical History   Diagnosis Date    CAD (coronary artery disease)     CKD (chronic kidney disease) stage 4, GFR 15-29 ml/min ([address] 2017    Diabetes ([facility] [address]     Hypertension        Past Surgical History   Procedure Laterality Date    Pr cardiac surg procedure unlist       cardiac cath without stents x3       Family History   Problem Relation Age of Onset    Diabetes Father        Social History   Substance Use Topics    Smoking status: Never Smoker    Smokeless tobacco: Never Used    Alcohol use 0.0 oz/week     0 Standard drinks or equivalent per week      Comment: Rare       Allergies   Allergen Reactions    Albuterol Other (comments)     patient reports that albuterol gives him phlegm and makes it harder to breathe    Pcn [Penicillins] Shortness of Breath     Just started course of PCN and hydrocodone on Monday       Current Facility-Administered Medications   Medication Dose Route Frequency    amLODIPine (NORVASC) tablet 10 mg  10 mg Oral DAILY    sodium chloride (NS) flush 5-10 mL  5-10 mL IntraVENous Q8H    sodium chloride (NS) flush 5-10 mL  5-10 mL IntraVENous PRN    acetaminophen (TYLENOL) tablet 650 mg  650 mg Oral Q4H PRN    oxyCODONE-acetaminophen (PERCOCET) 5-325 mg per tablet 1 Tab  1 Tab Oral Q4H PRN    HYDROmorphone (PF) (DILAUDID) injection 0.5 mg  0.5 mg IntraVENous Q4H PRN    zolpidem (AMBIEN) tablet 5 mg  5 mg Oral QHS PRN    insulin lispro (HUMALOG) injection   SubCUTAneous AC&HS    glucose chewable tablet 16 g  4 Tab Oral PRN    dextrose (D50W) injection syrg 12.5-25 g  12.5-25 g IntraVENous PRN    glucagon (GLUCAGEN) injection 1 mg  1 mg IntraMUSCular PRN    heparin (porcine) injection 5,000 Units  5,000 Units SubCUTAneous Q8H    vancomycin (VANCOCIN) 1,750 mg in 0.9% sodium chloride 500 mL IVPB  1,750 mg IntraVENous Q24H    prochlorperazine (COMPAZINE) injection 5 mg  5 mg IntraVENous Q6H PRN     Current Outpatient Prescriptions   Medication Sig    glipiZIDE SR (GLUCOTROL XL) 2.5 mg CR tablet Take 2.5 mg by mouth daily.  MAGNESIUM GLYCINATE PO Take 266 mg by mouth daily.  COQ10, UBIQUINOL, PO Take 60 mg by mouth daily.  cholecalciferol (VITAMIN D3) 1,000 unit tablet Take 5,000 Units by mouth daily.  cyanocobalamin 1,000 mcg tablet Take 1,000 mcg by mouth daily.  FOLIC ACID PO Take 971 mcg by mouth.     aspirin delayed-release 81 mg tablet Take 81 mg by mouth daily. REVIEW OF SYSTEMS   Negative except as stated in the HPI. Physical Exam:   Visit Vitals    /69 (BP 1 Location: Left arm, BP Patient Position: At rest)    Pulse 85    Temp 98 °F (36.7 °C)    Resp 18    Ht 5' 11\" (1.803 m)    Wt 117.9 kg (260 lb)    SpO2 96%    BMI 36.26 kg/m2       General:  Alert, cooperative, no distress, appears stated age. Head:  Normocephalic, without obvious abnormality, atraumatic. Eyes:  Conjunctivae/corneas clear. Nose: Nares normal. Septum midline. Mucosa normal.    Throat: Lips, mucosa, and tongue normal.    Neck: Supple, symmetrical, trachea midline, no adenopathy. Lungs:   Clear to auscultation bilaterally. Chest wall:  No tenderness or deformity. Heart:  Regular rate and rhythm, S1, S2 normal, no murmur, click, rub or gallop. Abdomen:   Soft, non-tender. Bowel sounds normal. No masses,  No organomegaly. Extremities: Right lower extremity 4+ pitting edema, red, and warm to touch; left lower extremity 3+ pitting edeam   Pulses: 2+ and symmetric all extremities.    Skin: Skin color, texture, turgor normal. No rashes or lesions   Lymph nodes: Cervical, supraclavicular nodes normal.   Neurologic: Grossly nonfocal       Lab Results   Component Value Date/Time    Sodium 132 02/15/2017 05:10 AM    Potassium 3.9 02/15/2017 05:10 AM    Chloride 96 02/15/2017 05:10 AM    CO2 28 02/15/2017 05:10 AM    BUN 35 02/15/2017 05:10 AM    Creatinine 2.33 02/15/2017 05:10 AM    Glucose 67 02/15/2017 05:10 AM    Calcium 8.6 02/15/2017 05:10 AM    Magnesium 2.1 02/15/2017 05:10 AM    Phosphorus 3.5 02/15/2017 05:10 AM       Lab Results   Component Value Date/Time    WBC 7.5 02/15/2017 05:10 AM    HGB 10.4 02/15/2017 05:10 AM    PLATELET 921 30/23/4802 05:10 AM    MCV 86.5 02/15/2017 05:10 AM       Lab Results   Component Value Date/Time    INR 1.0 01/22/2014 07:30 PM    aPTT 27.8 01/22/2014 07:30 PM    AST (SGOT) 36 02/14/2017 09:27 PM    Alk. phosphatase 118 02/14/2017 09:27 PM    Protein, total 7.7 02/14/2017 09:27 PM    Albumin 3.1 02/14/2017 09:27 PM    Globulin 4.6 02/14/2017 09:27 PM       No results found for: IRON, FE, TIBC, IBCT, PSAT, FERR    No results found for: SR, CRP, BINTA, ANAIGG, RA, RPR, RPRT, VDRLT, VDRLS, TSH, TSHEXT     No results found for: PH, PHI, PCO2, PCO2I, PO2, PO2I, HCO3, HCO3I, FIO2, FIO2I    Lab Results   Component Value Date/Time    CK-MB Index 1.6 01/22/2014 07:30 PM    Troponin-I, Qt. <0.04 01/22/2014 10:15 PM        Lab Results   Component Value Date/Time    Culture result: NO GROWTH 1 DAY 05/03/2011 04:38 PM    Culture result: NO GROWTH 5 DAYS 05/02/2011 11:30 AM    Culture result: ESCHERICHIA COLI 05/01/2011 10:45 AM       No results found for: TOXA1, RPR, HBCM, HBSAG, HAAB, HCAB, HCAB1, HAAT, G6PD, CRYAC, HIVGT, HIVR, HIV1, HIV12, HIVPC, HIVRPI    No results found for: VANCT, CPK    Lab Results   Component Value Date/Time    Color YELLOW/STRAW 01/22/2014 08:30 PM    Appearance CLEAR 01/22/2014 08:30 PM    pH (UA) 6.5 01/22/2014 08:30 PM    Protein 100 01/22/2014 08:30 PM    Glucose 100 01/22/2014 08:30 PM    Ketone NEGATIVE  01/22/2014 08:30 PM    Bilirubin NEGATIVE  01/22/2014 08:30 PM    Blood TRACE 01/22/2014 08:30 PM    Urobilinogen 0.2 01/22/2014 08:30 PM    Nitrites NEGATIVE  01/22/2014 08:30 PM    Leukocyte Esterase NEGATIVE  01/22/2014 08:30 PM    WBC 0-4 01/22/2014 08:30 PM    RBC 0-5 01/22/2014 08:30 PM    Bacteria NEGATIVE  01/22/2014 08:30 PM       IMPRESSION  · Cellulitis right lower extremity  · Asthma   · PABLO - currently not using CPAP at home  · HTN  · CKD - Nephrology following  · DM    PLAN  · Maintain oxygen saturations > 90%  · Will need outpatient sleep study to re-start on CPAP  · GI prophylaxis: not indicated  · DVT prophylaxis: Heparin      Thank you for allowing us to participate in the care of this patient.   We will be happy to follow along in his progress with you.     Moises Nesbitt NP

## 2017-02-15 NOTE — ED PROVIDER NOTES
HPI Comments: 78 y.o. male with past medical history significant for HTN, DM, CAD, CKD, and kidney stones who presents to the ED with chief complaint of right foot pain. Pt reports right foot pain for the past few days accompanied by swelling, increasing redness, and warmth, says his right foot is very tender to palpation and he has difficulty ambulating due to the pain. Pt states his sx worsened today. Pt states he was seen by his PCP and was referred to the ED for further evaluation. Pt denies known hx of circulation issues. Pt states he has chronic leg swelling and says his left leg is at baseline. There are no other acute medical complaints voiced at this time. PCP: Chetna Francisco MD  Nephrology: Bladimir Shay MD    Note written by Angelo Malone, as dictated by Mitch Hines MD 7:49 PM     The history is provided by the patient and a relative. Past Medical History:   Diagnosis Date    CAD (coronary artery disease)     Chronic kidney disease      kidney stones    Diabetes ([facility] [address]     Hypertension        Past Surgical History:   Procedure Laterality Date    Pr cardiac surg procedure unlist       cardiac cath without stents x3         Family History:   Problem Relation Age of Onset    Diabetes Father        Social History     Social History    Marital status:      Spouse name: N/A    Number of children: N/A    Years of education: N/A     Occupational History    Not on file. Social History Main Topics    Smoking status: Never Smoker    Smokeless tobacco: Never Used    Alcohol use 0.0 oz/week     0 Standard drinks or equivalent per week      Comment: Rare    Drug use: No    Sexual activity: Not on file     Other Topics Concern    Not on file     Social History Narrative         ALLERGIES: Albuterol and Pcn [penicillins]    Review of Systems   Constitutional: Negative for chills and fever. HENT: Negative for ear pain and sore throat.     Eyes: Negative for photophobia and pain. Respiratory: Negative for chest tightness and shortness of breath. Cardiovascular: Negative for chest pain. Gastrointestinal: Negative for abdominal pain, nausea and vomiting. Genitourinary: Negative for dysuria and flank pain. Musculoskeletal: Positive for gait problem (difficulty ambulating due to pain). Negative for back pain and neck pain. +right foot pain with swelling, redness, and warmth   Skin: Negative for rash and wound. Neurological: Negative for dizziness, light-headedness and headaches. All other systems reviewed and are negative. Vitals:    02/14/17 1934   BP: 193/84   Pulse: 82   Resp: 18   Temp: 97.4 °F (36.3 °C)   SpO2: 97%   Weight: 117.9 kg (260 lb)   Height: 5' 11\" (1.803 m)            Physical Exam   Constitutional: He is oriented to person, place, and time. He appears well-developed and well-nourished. No distress. HENT:   Head: Normocephalic and atraumatic. Eyes: Conjunctivae and EOM are normal.   Neck: Normal range of motion. Cardiovascular: Normal rate, regular rhythm, normal heart sounds and intact distal pulses. No murmur heard. Right foot with doppler + pulse   Pulmonary/Chest: Effort normal and breath sounds normal. No stridor. No respiratory distress. Abdominal: Soft. Bowel sounds are normal. There is no tenderness. Musculoskeletal: Normal range of motion. He exhibits edema and tenderness. Right lower leg: He exhibits tenderness, swelling and edema. Legs:  Neurological: He is alert and oriented to person, place, and time. No cranial nerve deficit. Skin: Skin is warm and dry. He is not diaphoretic. There is erythema. Psychiatric: He has a normal mood and affect. Nursing note and vitals reviewed. MDM  Number of Diagnoses or Management Options  Diagnosis management comments: Right lower leg with edema and erythema concerning for cellulitis, but will get doppler and give analgesia and start ABX.    Admit for further care       Amount and/or Complexity of Data Reviewed  Clinical lab tests: ordered and reviewed  Tests in the radiology section of CPT®: ordered and reviewed  Discuss the patient with other providers: yes    Patient Progress  Patient progress: improved    ED Course       Procedures    Jacey Leonarda  ** PRELIMINARY REPORT **     Name: Ruby Camacho  MRN: [id number]  : 18 Aug 1940  HIS Order #: [id number]  [id number] [facility] Visit #: [id number]  Date: 2017     TYPE OF TEST: Peripheral Venous Testing     REASON FOR TEST  Pain in limb, Limb swelling     Right Leg:-  Deep venous thrombosis: No  Superficial venous thrombosis: No  Deep venous insufficiency: No  Superficial venous insufficiency: No        INTERPRETATION/FINDINGS  PROCEDURE: RIGHT LOWER EXTREMITY VENOUS DUPLEX. Evaluation of lower  extremity veins with ultrasound (B-mode imaging, pulsed Doppler, color  Doppler). Includes the common femoral, deep femoral, femoral,  popliteal, posterior tibial, peroneal, and great saphenous veins. Other veins, for example the gastrocnemius and soleal veins, may also  be visualized.     FINDINGS: Technically difficult study due to patient inability to  tolerate compression maneuvers secondary to edema. Gray scale and  color flow duplex images of the visualized veins in the right lower  extremity demonstrate normal compressibility, spontaneous and  augmented flow profiles, and absence of filling defects throughout the  deep and superficial veins in the right lower extremity.     CONCLUSION: Right lower extremity venous duplex negative for deep  venous thrombosis or thrombophlebitis in the veins visualized. Left  common femoral vein is thrombus free. NOTE: 2D compression at the  distal calf was not tolerated therefore ruling out dvt cannot be  concluded.     ADDITIONAL COMMENTS     I have personally reviewed the data relevant to the interpretation of  this study.     TECHNOLOGIST: Sherren Craven. David  Signed: 02/14/2017 09:36 PM             Procedure Note- Peripheral IV Access  10:51 PM  Performed by: Rayna Carrera MD  I gained IV access using  20 gauge needle because the patient had no vascular access. After cleaning the site with alcohol prep, the Right brachiocephalic vein was localized with ultrasound guidance in an anterior approach. Line confirmation was obtained by direct visualization and good blood return. No anaesthetic was used. The line was successfully flushed with normal saline and was secured with transparent tape. Estimated blood loss: less than 1ml  The procedure took 1-15 minutes, and pt tolerated well.

## 2017-02-15 NOTE — PROGRESS NOTES
[facility]: MED RECONCILIATION    Comments/Recommendations:  Patient manages his own medications, but has not taken many of his previously prescribed medications in the past few months. Currently the only prescribed medication the patient takes is Glipizide. Medications added:     · Glipizide ER 2.5mg daily  · Cholecalciferol 5000 units daily  · Magnesium glycinate 266mg daily    Medications removed:    · Amlodipine 10mg daily  · Chlothalidone 25mg daily  · Ergocalciferol 50,000 units (no frequency listed)  · Furosemide 64AQ daily  · Garlic 0173AI daily  · Glyburide 5mg twice daily  · Isosorbide mononitrate 30mg daily  · Sodium bicarbonate 325mg twice daily   · Spironolactone 25mg daily  · Vitamin B complex daily    Medications adjusted:    · None    Information obtained from:   Patient    Significant PMH/Disease States:   Patient Active Problem List   Diagnosis Code    HTN (hypertension) I10    DM type 2 (diabetes mellitus, type 2) ([facility] E11.9    Obesity E66.9    PABLO (obstructive sleep apnea) G47.33    CAD (coronary artery disease) I25.10    Pure hypercholesterolemia E78.00    Cellulitis L03.90    CKD (chronic kidney disease) stage 4, GFR 15-29 ml/min ([facility]) N18.4     Past Medical History   Diagnosis Date    CAD (coronary artery disease)     CKD (chronic kidney disease) stage 4, GFR 15-29 ml/min ([facility] 2/14/2017    Diabetes ([facility]     Hypertension      Chief Complaint for this Admission:   Chief Complaint   Patient presents with    Foot Swelling    Foot Pain     Allergies: Albuterol and Pcn [penicillins]    Prior to Admission Medications:   Prior to Admission Medications   Prescriptions Last Dose Informant Patient Reported? Taking? COQ10, UBIQUINOL, PO 2/14/2017 at AM  Yes Yes   Sig: Take 60 mg by mouth daily. FOLIC ACID PO Unknown at Unknown time  Yes No   Sig: Take 400 mcg by mouth. MAGNESIUM GLYCINATE PO 2/14/2017 at AM  Yes Yes   Sig: Take 266 mg by mouth daily.    aspirin delayed-release 81 mg tablet   Yes No   Sig: Take 81 mg by mouth daily. cholecalciferol (VITAMIN D3) 1,000 unit tablet Unknown at Unknown time  Yes No   Sig: Take 5,000 Units by mouth daily. cyanocobalamin 1,000 mcg tablet Unknown at Unknown time  Yes No   Sig: Take 1,000 mcg by mouth daily. glipiZIDE SR (GLUCOTROL XL) 2.5 mg CR tablet 2/14/2017 at AM  Yes Yes   Sig: Take 2.5 mg by mouth daily.       Facility-Administered Medications: None     Sang Friday, PharmD, BCPS  Contact:

## 2017-02-15 NOTE — ED NOTES
Dr Yadira Long present to witness BS reading of 31 on POC. This recheck was done second to critical serum result of 36.  Pt given 4oz oj

## 2017-02-15 NOTE — PROCEDURES
[address]  *** FINAL REPORT ***    Name: Emily Taylor  MRN: [id number]  : 18 Aug 1940  HIS Order #: [id number]  [id number] [facility] Visit #: [id number]  Date: 2017    TYPE OF TEST: Peripheral Venous Testing    REASON FOR TEST  Pain in limb, Limb swelling    Right Leg:-  Deep venous thrombosis:           No  Superficial venous thrombosis:    No  Deep venous insufficiency:        No  Superficial venous insufficiency: No      INTERPRETATION/FINDINGS  PROCEDURE:  RIGHT LOWER EXTREMITY  VENOUS DUPLEX. Evaluation of lower  extremity veins with ultrasound (B-mode imaging, pulsed Doppler, color   Doppler). Includes the common femoral, deep femoral, femoral,  popliteal, posterior tibial, peroneal, and great saphenous veins. Other veins, for example the gastrocnemius and soleal veins, may also  be visualized. FINDINGS: Technically difficult study due to patient inability to  tolerate compression maneuvers secondary to edema. Gray scale and  color flow duplex images of the visualized veins in the right lower  extremity demonstrate normal compressibility, spontaneous and  augmented flow profiles, and absence of filling defects throughout the   deep and superficial veins in the right lower extremity. CONCLUSION: Right lower extremity venous duplex negative for deep  venous thrombosis or thrombophlebitis in the veins visualized. Left  common femoral vein is thrombus free. NOTE: 2D compression at the  distal calf was not tolerated therefore ruling out dvt cannot be  concluded. ADDITIONAL COMMENTS    I have personally reviewed the data relevant to the interpretation of  this  study. TECHNOLOGIST: Ihsan Nagel. David  Signed: 2017 09:36 PM    PHYSICIAN: Andrew Kwok.  Arleen Bhatti MD  Signed: 02/15/2017 08:38 AM

## 2017-02-15 NOTE — ED TRIAGE NOTES
Patient arrives with referral from MD for swelling, redness and pain to R foot x 3 days; r/o blood clot.

## 2017-02-15 NOTE — H&P
[place] [address]  [phone]    Admission History and Physical      NAME:  Rafaela Daniel. :   1940   MRN:  [id number]     PCP:  Luisa James MD     Date/Time:  2017         Subjective:     CHIEF COMPLAINT: pain     HISTORY OF PRESENT ILLNESS:     Mr. Amarilys Aguilar is a 78 y.o.  male who is admitted with cellulitis. Mr. Amarilys Aguilar presented to the Emergency Department this PM complaining of pain: right foot, for the last 3 days, constant, moderate to severe, associated with redness and swelling. History obtained from son and daughter-in-law, chart review and the patient. Previous records reviewed    Past Medical History   Diagnosis Date    CAD (coronary artery disease)     Chronic kidney disease      kidney stones    CKD (chronic kidney disease) stage 4, GFR 15-29 ml/min ([facility] [address] 2017    Diabetes ([facility] [address]     Hypertension         Past Surgical History   Procedure Laterality Date    Pr cardiac surg procedure unlist       cardiac cath without stents x3       Social History   Substance Use Topics    Smoking status: Never Smoker    Smokeless tobacco: Never Used    Alcohol use 0.0 oz/week     0 Standard drinks or equivalent per week      Comment: Rare        Family History   Problem Relation Age of Onset    Diabetes Father         Allergies   Allergen Reactions    Albuterol Other (comments)     patient reports that albuterol gives him phlegm and makes it harder to breathe    Pcn [Penicillins] Shortness of Breath     Just started course of PCN and hydrocodone on Monday        Prior to Admission medications    Medication Sig Start Date End Date Taking?  Authorizing Provider   spironolactone (ALDACTONE) 25 mg tablet TAKE 1 TABLET BY MOUTH DAILY 16   Luisa James MD   glyBURIDE (DIABETA) 5 mg tablet TAKE 1 TABLET BY MOUTH BEFORE BREAKFAST AND DINNER 16   Mary Knight MD   chlorthalidone (HYGROTEN) 25 mg tablet TAKE 1 TABLET BY MOUTH DAILY 6/17/16   Timbo Reece MD   isosorbide mononitrate ER (IMDUR) 30 mg tablet TAKE 1 TABLET BY MOUTH TWICE DAILY 5/19/16   Timbo Reece MD   furosemide (LASIX) 40 mg tablet Take  by mouth daily. Historical Provider   cyanocobalamin 1,000 mcg tablet Take 1,000 mcg by mouth daily. Historical Provider   ergocalciferol (ERGOCALCIFEROL) 50,000 unit capsule Take 50,000 Units by mouth. Historical Provider   vit B Cmplx 3-FA-Vit C-Biotin (NEPHRO BRIGID RX) 1- mg-mg-mcg tablet Take 1 Tab by mouth daily. Historical Provider   ACCU-CHEK FACUNDO PLUS TEST STRP strip CHECK BLOOD SUGAR TWICE DAILY 12/15/15   Timbo Reece MD   FOLIC ACID PO Take 869 mcg by mouth. Historical Provider   garlic 7,746 mg cap Take  by mouth. Gorge Hernandez MD   aspirin delayed-release 81 mg tablet Take 81 mg by mouth daily. Gorge Hernandez MD   sodium bicarbonate 325 mg tablet Take 325 mg by mouth two (2) times a day. Gorge Hernandez MD   amlodipine (NORVASC) 10 mg tablet Take 1 Tab by mouth daily.  5/5/11   Anton Pena MD         Review of Systems:  (bold if positive, if negative)    Gen:  Eyes:  ENT:  CVS:  Pulm:  GI:    :    MS:  PainswellingSkin:  erythemaPsych:  Endo:    Hem:  Renal:    Neuro:            Objective:      VITALS:    Vital signs reviewed; most recent are:    Visit Vitals    /78    Pulse 82    Temp 97.4 °F (36.3 °C)    Resp 18    Ht 5' 11\" (1.803 m)    Wt 117.9 kg (260 lb)    SpO2 97%    BMI 36.26 kg/m2     SpO2 Readings from Last 6 Encounters:   02/14/17 97%   12/11/15 97%   01/22/14 100%   05/05/11 98%   04/15/11 98%        No intake or output data in the 24 hours ending 02/14/17 2493         Exam:     Physical Exam:    Gen: Well-developed, obese, in no acute distress  HEENT:  Pink conjunctivae, PERRL, hearing intact to voice, moist mucous membranes  Neck: Supple, without masses, thyroid non-tender  Resp: No accessory muscle use, clear breath sounds without wheezes rales or rhonchi  Card: No murmurs, normal S1, S2 without thrills, bruits; 2-3 + pitting, peripheral edema, right > left  Abd:  Soft, non-tender, non-distended, normoactive bowel sounds are present, no palpable organomegaly and no detectable hernias  Lymph:  No cervical or inguinal adenopathy  Musc: No cyanosis or clubbing  Skin:   Neuro:  Cranial nerves are grossly intact, no focal motor weakness, follows commands appropriately  Psych:  Good insight, oriented to person, place and time, alert             Labs:    Recent Labs      02/14/17 2127   WBC  9.9   HGB  11.5*   HCT  34.3*   PLT  354     No results for input(s): NA, K, CL, CO2, GLU, BUN, CREA, CA, MG, PHOS, ALB, TBIL, TBILI, SGOT, ALT in the last 72 hours. Lab Results   Component Value Date/Time    Glucose (POC) 163 05/05/2011 04:39 PM    Glucose (POC) 264 05/05/2011 11:50 AM    Glucose (POC) 218 05/01/2011 01:16 PM     No results for input(s): PH, PCO2, PO2, HCO3, FIO2 in the last 72 hours. No results for input(s): INR in the last 72 hours.     No lab exists for component: INREXT, INREXT    Additional testing:  LE venous dopplers without definite acute DVT, couldn't fully evaluate left calf       Assessment/Plan:       Principal Problem:    Cellulitis (2/14/2017)   - continue vancomycin started in ED   - MRSA screen ordered   - portal of entry likely chronic onychomycosis, refuses Podiatry evaluation due to prior issues with podiatrists    Active Problems:    HTN (hypertension) (4/15/2011)   - BP up some, not on any antihypertensives   - medication history is sketchy, I do not think he is able to manage his own medications adequately   - follow BP for now      DM type 2 (diabetes mellitus, type 2) ([facility] [address] (4/15/2011)   - with renal disease   - hold glipizide due to prolonged hypoglycemia in the ED; this was likely mainly due to not eating all day, however   - follow with [facility]      Obesity (4/15/2011)   - counseled on weight loss      PABLO (obstructive sleep apnea) (4/15/2011)   - continue CPAP      CAD (coronary artery disease) (4/15/2011)   - stable, not really on cardiac meds, unclear if this is due to lack of follow up (now has new PCP) or poor compliance      Pure hypercholesterolemia (4/15/2011)   - no longer on statin, unclear if this is due to lack of follow up or compliance      CKD (chronic kidney disease) stage 4, GFR 15-29 ml/min ([facility]) (2/14/2017)   - creatinine is better than prior labs here, still think this is baseline CKD 4   - was previously on diuretics, not taking currently, does appear somewhat volume overloaded   - consult Renal       Code status:   - patient is FULL CODE      Total time spent with patient: [address] discussed with: Patient, Family, Nursing Staff and Dr. Bianca Marshall    Discussed:  Code Status, Care Plan and D/C Planning    Prophylaxis:  Hep SQ    Probable Disposition:  Home w/Family           ___________________________________________________    Attending Physician: Andrew Patterson MD

## 2017-02-15 NOTE — ED NOTES
Pt with very difficult to IV access. Failed attempt and pt having difficulty holding still during procedure. Pt very agitated with any type of procedure. Very uncomfortable and requesting new bed and new room. No other bed/room available at present. Family present. Per dr Hal case to perform US at bedside prior to blood specimen retrieval . Pt using urinal prior to procedure.  US tech present

## 2017-02-15 NOTE — PROGRESS NOTES
Qasim Vasquez [facility] 79  [address] [facility], [address] [place] [address]  [phone]      Medical Progress Note      NAME: Tamanna Torres :  1940  MRM:  [id number]    Date/Time: 2/15/2017  8:04 AM       Assessment and Plan:     Cellulitis - POA, RLE, maybe. Could just be general redness from skin damage from edema. NO SIRS criteria on admission. Getting empiric vancomycin. Check MRSA screen. If negative screen and cx, change to empiric keflex. CKD (chronic kidney disease) stage 4 / hyponatremia - Cr a bit lower than baseline, due to non-compliance with fluid restriction. Suspect CHF. Consult renal.  Avoid nephrotoxins. DM type 2 causing renal, vascular and neurological disease / Hypoglycemia - Acutely low BG overnight. Diabetic diet and counseling. SSI per protocol. Hold home glipizide. Check A1c. Consult endocrinology    CAD (coronary artery disease) / HTN (hypertension) - No acute signs. Check ECHO. After renal eval, start BB, ACE diuretics as they recommend. PABLO (obstructive sleep apnea) / Obesity - Consult pulm to arrange re-testing and CPAP. Anemia - Due to chronic kidney disease. Pure hypercholesterolemia - Check lipid panel. Not on statin    Dementia / Anxiety - Noted on interview. Not on meds. As outpatient needs neuropsych consult. Palliative care consult now. Subjective:     Chief Complaint:  Painful RLE    ROS:  (bold if positive, if negative)      Tolerating minimal PT Tolerating Diet        Objective:     Last 24hrs VS reviewed since prior progress note.  Most recent are:    Visit Vitals    /69 (BP 1 Location: Left arm, BP Patient Position: At rest)    Pulse 85    Temp 98 °F (36.7 °C)    Resp 18    Ht 5' 11\" (1.803 m)    Wt 117.9 kg (260 lb)    SpO2 96%    BMI 36.26 kg/m2     SpO2 Readings from Last 6 Encounters:   02/15/17 96%   12/11/15 97%   14 100%   11 98%   04/15/11 98%          Intake/Output Summary (Last 24 hours) at 02/15/17 0804  Last data filed at 02/15/17 0239   Gross per 24 hour   Intake              500 ml   Output              300 ml   Net              200 ml        Physical Exam:    Gen:  Obese, in no acute distress  HEENT:  Pink conjunctivae, PERRL, hearing intact to voice, moist mucous membranes  Neck:  Supple, without masses, thyroid non-tender  Resp:  No accessory muscle use, distant breath sounds without wheezes rales or rhonchi  Card:  No murmurs, tachycardic S1, S2 without thrills, bruits, R>L 2-4+ peripheral edema  Abd:  Soft, non-tender, non-distended, normoactive bowel sounds are present, no mass  Lymph:  No cervical or inguinal adenopathy  Musc:  No cyanosis or clubbing  Skin:  No rashes or ulcers, skin turgor is good  Neuro:  Cranial nerves are grossly intact, general motor weakness, follows commands vaguely  Psych:  Poor insight, oriented to person, place, pressured, anxious, tangential    Telemetry reviewed:   normal sinus rhythm  __________________________________________________________________  Medications Reviewed: (see below)  Medications:     Current Facility-Administered Medications   Medication Dose Route Frequency    sodium chloride (NS) flush 5-10 mL  5-10 mL IntraVENous Q8H    sodium chloride (NS) flush 5-10 mL  5-10 mL IntraVENous PRN    acetaminophen (TYLENOL) tablet 650 mg  650 mg Oral Q4H PRN    oxyCODONE-acetaminophen (PERCOCET) 5-325 mg per tablet 1 Tab  1 Tab Oral Q4H PRN    HYDROmorphone (PF) (DILAUDID) injection 0.5 mg  0.5 mg IntraVENous Q4H PRN    zolpidem (AMBIEN) tablet 5 mg  5 mg Oral QHS PRN    insulin lispro (HUMALOG) injection   SubCUTAneous AC&HS    glucose chewable tablet 16 g  4 Tab Oral PRN    dextrose (D50W) injection syrg 12.5-25 g  12.5-25 g IntraVENous PRN    glucagon (GLUCAGEN) injection 1 mg  1 mg IntraMUSCular PRN    heparin (porcine) injection 5,000 Units  5,000 Units SubCUTAneous Q8H    vancomycin (VANCOCIN) 1,750 mg in 0.9% sodium chloride 500 mL IVPB  1,750 mg IntraVENous Q24H    prochlorperazine (COMPAZINE) injection 5 mg  5 mg IntraVENous Q6H PRN     Current Outpatient Prescriptions   Medication Sig    glipiZIDE SR (GLUCOTROL XL) 2.5 mg CR tablet Take 2.5 mg by mouth daily.  MAGNESIUM GLYCINATE PO Take 266 mg by mouth daily.  COQ10, UBIQUINOL, PO Take 60 mg by mouth daily.  cholecalciferol (VITAMIN D3) 1,000 unit tablet Take 5,000 Units by mouth daily.  cyanocobalamin 1,000 mcg tablet Take 1,000 mcg by mouth daily.  FOLIC ACID PO Take 542 mcg by mouth.  aspirin delayed-release 81 mg tablet Take 81 mg by mouth daily. Lab Data Reviewed: (see below)  Lab Review:     Recent Labs      02/15/17   0510  02/14/17   2127   WBC  7.5  9.9   HGB  10.4*  11.5*   HCT  30.7*  34.3*   PLT  309  354     Recent Labs      02/15/17   0510  02/14/17   2127   NA  132*  132*   K  3.9  4.4   CL  96*  94*   CO2  28  26   GLU  67  36*   BUN  35*  35*   CREA  2.33*  2.39*   CA  8.6  9.0   MG  2.1   --    PHOS  3.5   --    ALB   --   3.1*   TBILI   --   1.1*   SGOT   --   36   ALT   --   38     Lab Results   Component Value Date/Time    Glucose (POC) 51 02/15/2017 08:01 AM    Glucose (POC) 140 02/14/2017 11:27 PM    Glucose (POC) 39 02/14/2017 10:57 PM    Glucose (POC) 31 02/14/2017 10:40 PM    Glucose (POC) 31 02/14/2017 10:20 PM    Glucose (POC) 218 05/01/2011 01:16 PM     No results for input(s): PH, PCO2, PO2, HCO3, FIO2 in the last 72 hours. No results for input(s): INR in the last 72 hours. No lab exists for component: INREXT  All Micro Results     Procedure Component Value Units Date/Time    CULTURE, MRSA [[id number]]     Order Status:  Sent Specimen:  Robbi           I have reviewed notes of prior 24hr.     Other pertinent lab: none    Total time spent with patient: [address] [place] [address] discussed with: Patient, Family, Nursing Staff and >50% of time spent in counseling and coordination of care    Discussed:  Care Plan and D/C Planning    Prophylaxis:  Hep SQ and H2B/PPI    Disposition:  SNF/LTC           ___________________________________________________    Attending Physician: Yumiko Munroe MD

## 2017-02-16 ENCOUNTER — APPOINTMENT (OUTPATIENT)
Dept: GENERAL RADIOLOGY | Age: 77
DRG: 603 | End: 2017-02-16
Attending: INTERNAL MEDICINE
Payer: MEDICARE

## 2017-02-16 LAB
ALBUMIN SERPL BCP-MCNC: 2.7 G/DL (ref 3.5–5)
ANION GAP BLD CALC-SCNC: 11 MMOL/L (ref 5–15)
BUN SERPL-MCNC: 29 MG/DL (ref 6–20)
BUN/CREAT SERPL: 14 (ref 12–20)
CALCIUM SERPL-MCNC: 8.2 MG/DL (ref 8.5–10.1)
CALCIUM SERPL-MCNC: 8.2 MG/DL (ref 8.5–10.1)
CHLORIDE SERPL-SCNC: 100 MMOL/L (ref 97–108)
CHOLEST SERPL-MCNC: 162 MG/DL
CO2 SERPL-SCNC: 25 MMOL/L (ref 21–32)
CREAT SERPL-MCNC: 2.1 MG/DL (ref 0.7–1.3)
CREAT UR-MCNC: 33.03 MG/DL
ERYTHROCYTE [DISTWIDTH] IN BLOOD BY AUTOMATED COUNT: 14.1 % (ref 11.5–14.5)
GLUCOSE BLD STRIP.AUTO-MCNC: 114 MG/DL (ref 65–100)
GLUCOSE BLD STRIP.AUTO-MCNC: 115 MG/DL (ref 65–100)
GLUCOSE BLD STRIP.AUTO-MCNC: 123 MG/DL (ref 65–100)
GLUCOSE BLD STRIP.AUTO-MCNC: 247 MG/DL (ref 65–100)
GLUCOSE BLD STRIP.AUTO-MCNC: 79 MG/DL (ref 65–100)
GLUCOSE SERPL-MCNC: 80 MG/DL (ref 65–100)
HCT VFR BLD AUTO: 31.6 % (ref 36.6–50.3)
HDLC SERPL-MCNC: 47 MG/DL
HDLC SERPL: 3.4 {RATIO} (ref 0–5)
HGB BLD-MCNC: 10.8 G/DL (ref 12.1–17)
LDLC SERPL CALC-MCNC: 96.2 MG/DL (ref 0–100)
LIPID PROFILE,FLP: NORMAL
MAGNESIUM SERPL-MCNC: 2 MG/DL (ref 1.6–2.4)
MCH RBC QN AUTO: 29.7 PG (ref 26–34)
MCHC RBC AUTO-ENTMCNC: 34.2 G/DL (ref 30–36.5)
MCV RBC AUTO: 86.8 FL (ref 80–99)
PHOSPHATE SERPL-MCNC: 3.6 MG/DL (ref 2.6–4.7)
PLATELET # BLD AUTO: 309 K/UL (ref 150–400)
POTASSIUM SERPL-SCNC: 4.2 MMOL/L (ref 3.5–5.1)
PROT UR-MCNC: 65 MG/DL (ref 0–11.9)
PROT/CREAT UR-RTO: 2
PTH-INTACT SERPL-MCNC: 90.3 PG/ML (ref 14–72)
RBC # BLD AUTO: 3.64 M/UL (ref 4.1–5.7)
SERVICE CMNT-IMP: ABNORMAL
SERVICE CMNT-IMP: NORMAL
SODIUM SERPL-SCNC: 136 MMOL/L (ref 136–145)
TRIGL SERPL-MCNC: 94 MG/DL (ref ?–150)
VLDLC SERPL CALC-MCNC: 18.8 MG/DL
WBC # BLD AUTO: 7.1 K/UL (ref 4.1–11.1)

## 2017-02-16 PROCEDURE — 73630 X-RAY EXAM OF FOOT: CPT

## 2017-02-16 PROCEDURE — 74011250637 HC RX REV CODE- 250/637: Performed by: INTERNAL MEDICINE

## 2017-02-16 PROCEDURE — 80202 ASSAY OF VANCOMYCIN: CPT | Performed by: INTERNAL MEDICINE

## 2017-02-16 PROCEDURE — 80061 LIPID PANEL: CPT | Performed by: INTERNAL MEDICINE

## 2017-02-16 PROCEDURE — 83970 ASSAY OF PARATHORMONE: CPT | Performed by: INTERNAL MEDICINE

## 2017-02-16 PROCEDURE — 80069 RENAL FUNCTION PANEL: CPT | Performed by: INTERNAL MEDICINE

## 2017-02-16 PROCEDURE — 65270000029 HC RM PRIVATE

## 2017-02-16 PROCEDURE — 36415 COLL VENOUS BLD VENIPUNCTURE: CPT | Performed by: INTERNAL MEDICINE

## 2017-02-16 PROCEDURE — 97530 THERAPEUTIC ACTIVITIES: CPT | Performed by: PHYSICAL THERAPIST

## 2017-02-16 PROCEDURE — 74011250637 HC RX REV CODE- 250/637: Performed by: SPECIALIST

## 2017-02-16 PROCEDURE — 74011250637 HC RX REV CODE- 250/637: Performed by: PHYSICIAN ASSISTANT

## 2017-02-16 PROCEDURE — 84156 ASSAY OF PROTEIN URINE: CPT | Performed by: INTERNAL MEDICINE

## 2017-02-16 PROCEDURE — 83735 ASSAY OF MAGNESIUM: CPT | Performed by: INTERNAL MEDICINE

## 2017-02-16 PROCEDURE — 85027 COMPLETE CBC AUTOMATED: CPT | Performed by: INTERNAL MEDICINE

## 2017-02-16 PROCEDURE — 82962 GLUCOSE BLOOD TEST: CPT

## 2017-02-16 PROCEDURE — 74011250636 HC RX REV CODE- 250/636: Performed by: INTERNAL MEDICINE

## 2017-02-16 PROCEDURE — 97161 PT EVAL LOW COMPLEX 20 MIN: CPT | Performed by: PHYSICAL THERAPIST

## 2017-02-16 PROCEDURE — 74011636637 HC RX REV CODE- 636/637: Performed by: INTERNAL MEDICINE

## 2017-02-16 RX ORDER — LISINOPRIL 5 MG/1
2.5 TABLET ORAL DAILY
Status: DISCONTINUED | OUTPATIENT
Start: 2017-02-17 | End: 2017-02-18 | Stop reason: HOSPADM

## 2017-02-16 RX ORDER — GUAIFENESIN 100 MG/5ML
81 LIQUID (ML) ORAL DAILY
Status: DISCONTINUED | OUTPATIENT
Start: 2017-02-16 | End: 2017-02-18 | Stop reason: HOSPADM

## 2017-02-16 RX ORDER — CARVEDILOL 12.5 MG/1
12.5 TABLET ORAL 2 TIMES DAILY WITH MEALS
Status: DISCONTINUED | OUTPATIENT
Start: 2017-02-16 | End: 2017-02-18 | Stop reason: HOSPADM

## 2017-02-16 RX ADMIN — LINAGLIPTIN 5 MG: 5 TABLET, FILM COATED ORAL at 14:28

## 2017-02-16 RX ADMIN — AMLODIPINE BESYLATE 10 MG: 5 TABLET ORAL at 08:18

## 2017-02-16 RX ADMIN — VANCOMYCIN HYDROCHLORIDE 1750 MG: 10 INJECTION, POWDER, LYOPHILIZED, FOR SOLUTION INTRAVENOUS at 22:42

## 2017-02-16 RX ADMIN — Medication 10 ML: at 22:58

## 2017-02-16 RX ADMIN — CARVEDILOL 12.5 MG: 12.5 TABLET, FILM COATED ORAL at 16:48

## 2017-02-16 RX ADMIN — Medication 10 ML: at 06:14

## 2017-02-16 RX ADMIN — ASPIRIN 81 MG CHEWABLE TABLET 81 MG: 81 TABLET CHEWABLE at 10:20

## 2017-02-16 RX ADMIN — ACETAMINOPHEN 650 MG: 325 TABLET ORAL at 10:20

## 2017-02-16 RX ADMIN — HEPARIN SODIUM 5000 UNITS: 5000 INJECTION, SOLUTION INTRAVENOUS; SUBCUTANEOUS at 08:19

## 2017-02-16 RX ADMIN — INSULIN LISPRO 2 UNITS: 100 INJECTION, SOLUTION INTRAVENOUS; SUBCUTANEOUS at 11:53

## 2017-02-16 RX ADMIN — HEPARIN SODIUM 5000 UNITS: 5000 INJECTION, SOLUTION INTRAVENOUS; SUBCUTANEOUS at 16:48

## 2017-02-16 NOTE — WOUND CARE
Wound Care Consult:  Consulted by RN for [employer]. Initial wound and skin assessment performed by [facility]. Patient is alert and no distress at this time. No family present. Patient is able to turn self in bed and assist with BSC. Assessment:  All skin folds and bony prominences assessed and no impairment noted. Bilateral heels dry, flaky skin. Intact. Bilateral feet toenails are long, and thick. Right LL knee to toes swollen, red, and warm to touch. Tender to palpation on dorsal aspect of foot. POA no open areas. Sacral area intact no redness noted. No further impairments noted. Treatment:  Lotion to BLE  Per patient he is comfortable in bed, and per [facility] no reason to have a air mattress at this time. Recommendations/Plan:  Recommend patient follow up with his Podiatrist outpatient for toe nail cut down. Please re consult as needed.      Colton Ly RN

## 2017-02-16 NOTE — PROGRESS NOTES
[facility]     Renal Daily Progress Note:     Admission Date: 2017     Subjective: patient seen and examined, well known to practice  Right leg remains swollen and tender especially at foot. Not SOB, no chest or abd pain. No N/V. Eating ok. Review of Systems  Pertinent items are noted in HPI.     Objective:     Visit Vitals    /78 (BP 1 Location: Right arm)    Pulse 76    Temp 98 °F (36.7 °C)    Resp 18    Ht 5' 11\" (1.803 m)    Wt 117.9 kg (260 lb)    SpO2 95%    BMI 36.26 kg/m2     Temp (24hrs), Av.6 °F (36.4 °C), Min:97.4 °F (36.3 °C), Max:98 °F (36.7 °C)        Intake/Output Summary (Last 24 hours) at 17 0833  Last data filed at 17 5518   Gross per 24 hour   Intake                0 ml   Output             1999 ml   Net            -1999 ml     Current Facility-Administered Medications   Medication Dose Route Frequency    amLODIPine (NORVASC) tablet 10 mg  10 mg Oral DAILY    sodium chloride (NS) flush 5-10 mL  5-10 mL IntraVENous Q8H    sodium chloride (NS) flush 5-10 mL  5-10 mL IntraVENous PRN    acetaminophen (TYLENOL) tablet 650 mg  650 mg Oral Q4H PRN    oxyCODONE-acetaminophen (PERCOCET) 5-325 mg per tablet 1 Tab  1 Tab Oral Q4H PRN    HYDROmorphone (PF) (DILAUDID) injection 0.5 mg  0.5 mg IntraVENous Q4H PRN    zolpidem (AMBIEN) tablet 5 mg  5 mg Oral QHS PRN    insulin lispro (HUMALOG) injection   SubCUTAneous AC&HS    glucose chewable tablet 16 g  4 Tab Oral PRN    dextrose (D50W) injection syrg 12.5-25 g  12.5-25 g IntraVENous PRN    glucagon (GLUCAGEN) injection 1 mg  1 mg IntraMUSCular PRN    heparin (porcine) injection 5,000 Units  5,000 Units SubCUTAneous Q8H    vancomycin (VANCOCIN) 1,750 mg in 0.9% sodium chloride 500 mL IVPB  1,750 mg IntraVENous Q24H    prochlorperazine (COMPAZINE) injection 5 mg  5 mg IntraVENous Q6H PRN       Physical Exam:General appearance: alert, cooperative, no distress, appears stated age  Neck: supple, symmetrical, trachea midline and no JVD  Lungs: clear to auscultation bilaterally  Heart: regular rate and rhythm, no S3 or S4  Abdomen: soft, non-tender.  Bowel sounds normal. No masses,  no organomegaly  Extremities: edema and erythema right lower leg and foot, tender to palpation  Data Review:     LABS:  Recent Labs      02/16/17   0656  02/15/17   0510  02/14/17 2127   NA  136  132*  132*   K  4.2  3.9  4.4   CL  100  96*  94*   CO2  25  28  26   BUN  29*  35*  35*   CREA  2.10*  2.33*  2.39*   CA  8.2*  8.6  9.0   ALB  2.7*   --   3.1*   PHOS  3.6  3.5   --    MG  2.0  2.1   --      Recent Labs      02/16/17   0656  02/15/17   0510  02/14/17 2127   WBC  7.1  7.5  9.9   HGB  10.8*  10.4*  11.5*   HCT  31.6*  30.7*  34.3*   PLT  309  309  354     Recent Labs      02/16/17   0614   CREAU  33.03       Assessment:   Renal Specific Problems  CKD 4--labs very stable  Hypertension by hx  Cellulitis right leg/ foot  Secondary hyperparathyroidism-mild      Plan:     Obtain/ Order: labs/cultures/radiology/procedures:          Therapeutic:    Antibiotics as ordered  No direct renal interventions needed   Avoid [address], [place]    [phone]

## 2017-02-16 NOTE — PROGRESS NOTES
Name: Bolivar Hinton. Hospital: [address]   : 1940 Admit Date: 2017   Phone: [phone]  Room: [facility]/   PCP: Kuldip Faust MD  MRN: [id number]   Date: 2017  Code: Full Code          Chart and notes reviewed. Data reviewed. I review the patient's current medications in the medical record at each encounter. I have evaluated and examined the patient. Overnight events  Afebrile  Sats 94% on RA  Creat 2.10 - better  No overnight events    ROS: Patient's only complaint this morning is right foot pain and swelling. Did sleep well last night. Denies fever or chills. Denies SOB. Denies CP. Current Facility-Administered Medications   Medication Dose Route Frequency    aspirin chewable tablet 81 mg  81 mg Oral DAILY    amLODIPine (NORVASC) tablet 10 mg  10 mg Oral DAILY    sodium chloride (NS) flush 5-10 mL  5-10 mL IntraVENous Q8H    sodium chloride (NS) flush 5-10 mL  5-10 mL IntraVENous PRN    acetaminophen (TYLENOL) tablet 650 mg  650 mg Oral Q4H PRN    oxyCODONE-acetaminophen (PERCOCET) 5-325 mg per tablet 1 Tab  1 Tab Oral Q4H PRN    HYDROmorphone (PF) (DILAUDID) injection 0.5 mg  0.5 mg IntraVENous Q4H PRN    zolpidem (AMBIEN) tablet 5 mg  5 mg Oral QHS PRN    insulin lispro (HUMALOG) injection   SubCUTAneous AC&HS    glucose chewable tablet 16 g  4 Tab Oral PRN    dextrose (D50W) injection syrg 12.5-25 g  12.5-25 g IntraVENous PRN    glucagon (GLUCAGEN) injection 1 mg  1 mg IntraMUSCular PRN    heparin (porcine) injection 5,000 Units  5,000 Units SubCUTAneous Q8H    vancomycin (VANCOCIN) 1,750 mg in 0.9% sodium chloride 500 mL IVPB  1,750 mg IntraVENous Q24H    prochlorperazine (COMPAZINE) injection 5 mg  5 mg IntraVENous Q6H PRN         REVIEW OF SYSTEMS   Negative except as stated in the HPI.       Physical Exam:   Visit Vitals    /75 (BP 1 Location: Left arm, BP Patient Position: At rest)    Pulse 88    Temp 98.2 °F (36.8 °C)    Resp 18    Ht 5' 11\" (1.803 m)    Wt 117.9 kg (260 lb)    SpO2 94%    BMI 36.26 kg/m2       General:  Alert, cooperative, no distress, appears stated age. Head:  Normocephalic, without obvious abnormality, atraumatic. Eyes:  Conjunctivae/corneas clear. Nose: Nares normal. Septum midline. Mucosa normal.    Throat: Lips, mucosa, and tongue normal.    Neck: Supple, symmetrical, trachea midline, no adenopathy. Lungs:   Clear to auscultation bilaterally. Chest wall:  No tenderness or deformity. Heart:  Regular rate and rhythm, S1, S2 normal, no murmur, click, rub or gallop. Abdomen:   Soft, non-tender. Bowel sounds normal. No masses,  No organomegaly. Extremities: Right lower extremity 3+ pitting edema, erythema, and warm to touch; left lower extremity 2+ pitting edema   Pulses: 2+ and symmetric all extremities. Skin: Skin color, texture, turgor normal. No rashes or lesions   Lymph nodes: Cervical, supraclavicular nodes normal.   Neurologic: Grossly nonfocal       Lab Results   Component Value Date/Time    Sodium 136 02/16/2017 06:56 AM    Potassium 4.2 02/16/2017 06:56 AM    Chloride 100 02/16/2017 06:56 AM    CO2 25 02/16/2017 06:56 AM    BUN 29 02/16/2017 06:56 AM    Creatinine 2.10 02/16/2017 06:56 AM    Glucose 80 02/16/2017 06:56 AM    Calcium 8.2 02/16/2017 06:56 AM    Magnesium 2.0 02/16/2017 06:56 AM    Phosphorus 3.6 02/16/2017 06:56 AM       Lab Results   Component Value Date/Time    WBC 7.1 02/16/2017 06:56 AM    HGB 10.8 02/16/2017 06:56 AM    PLATELET 295 07/35/8648 06:56 AM    MCV 86.8 02/16/2017 06:56 AM       Lab Results   Component Value Date/Time    INR 1.0 01/22/2014 07:30 PM    aPTT 27.8 01/22/2014 07:30 PM    AST (SGOT) 36 02/14/2017 09:27 PM    Alk.  phosphatase 118 02/14/2017 09:27 PM    Protein, total 7.7 02/14/2017 09:27 PM    Albumin 2.7 02/16/2017 06:56 AM    Globulin 4.6 02/14/2017 09:27 PM       No results found for: IRON, FE, TIBC, IBCT, PSAT, FERR    No results found for: SR, CRP, BINTA, ANAIGG, RA, RPR, RPRT, VDRLT, VDRLS, TSH, TSHEXT, TSHEXT     No results found for: PH, PHI, PCO2, PCO2I, PO2, PO2I, HCO3, HCO3I, FIO2, FIO2I    Lab Results   Component Value Date/Time    CK-MB Index 1.6 01/22/2014 07:30 PM    Troponin-I, Qt. <0.04 01/22/2014 10:15 PM        Lab Results   Component Value Date/Time    Culture result: NO GROWTH 1 DAY 05/03/2011 04:38 PM    Culture result: NO GROWTH 5 DAYS 05/02/2011 11:30 AM    Culture result: ESCHERICHIA COLI 05/01/2011 10:45 AM       No results found for: TOXA1, RPR, HBCM, HBSAG, HAAB, HCAB, HCAB1, HAAT, G6PD, CRYAC, HIVGT, HIVR, HIV1, HIV12, HIVPC, HIVRPI    No results found for: VANCT, CPK    Lab Results   Component Value Date/Time    Color YELLOW/STRAW 01/22/2014 08:30 PM    Appearance CLEAR 01/22/2014 08:30 PM    pH (UA) 6.5 01/22/2014 08:30 PM    Protein 100 01/22/2014 08:30 PM    Glucose 100 01/22/2014 08:30 PM    Ketone NEGATIVE  01/22/2014 08:30 PM    Bilirubin NEGATIVE  01/22/2014 08:30 PM    Blood TRACE 01/22/2014 08:30 PM    Urobilinogen 0.2 01/22/2014 08:30 PM    Nitrites NEGATIVE  01/22/2014 08:30 PM    Leukocyte Esterase NEGATIVE  01/22/2014 08:30 PM    WBC 0-4 01/22/2014 08:30 PM    RBC 0-5 01/22/2014 08:30 PM    Bacteria NEGATIVE  01/22/2014 08:30 PM       Images: no new images this morning    IMPRESSION  · Cellulitis right lower extremity  · Asthma: stable   · PABLO - currently not using CPAP at home  · HTN  · CKD - Nephrology following  · DM    PLAN  · Maintain oxygen saturations > 90%  · Will need outpatient sleep study to re-start on CPAP; patient seems unwilling to resume PAP therapy at this time  · Continue abx per primary team for cellulitis; MRSA swab pending  · GI prophylaxis: not indicated  · DVT prophylaxis: Heparin    Patient is stable from a pulmonary standpoint. We will sign off and be available as needed. Patient to keep routine pulmonary follow up with Dr. Get Saxena. Please call with questions.     Camilla Bolivar

## 2017-02-16 NOTE — PROGRESS NOTES
Bedside and Verbal shift change report given to Shalonda RN (oncoming nurse) by Lennox Cabrera RN (offgoing nurse). Report included the following information SBAR, Kardex, Procedure Summary, Intake/Output, MAR and Recent Results.

## 2017-02-16 NOTE — PROGRESS NOTES
Palliative Medicine Consult  [phone]    Patient Name: Melanie Parsons. YOB: 1940      Date of Initial Consult: 2-15-17  Reason for Consult: care decisions   Requesting Physician: Marline Ramos  Primary Care Physician: Lyndsey Garza MD          Summary:   Melanie Dumont is a 78y.o. year old with a past history of HCL, pulmonary HTN, PABLO, obesity, dementia, DM2, HTN, CKD, CAD, AS, anxiety, who was admitted on 2/14/2017 from home with a diagnosis of swelling of the right lower leg and foot with pain and erythema. Current medical issues leading to Palliative Medicine involvement include: swelling of the right lower leg and foot with pain and erythema. Palliative Diagnoses:   1. Cellulitis  of the left leg and foot  2. Pain left leg and foot  3. Altered mental status  4. Edema right leg  5. Anxious         Plan:   1. Family meeting: son and DIL at bedside, spouse is home with back pain as per family, she has difficulty taking care of her . He does not have a formal diagnosis of dementia but clearly has a cognitive dysfunction. He perseverates on a theme and has difficulty moving from that theme and at times his children report it will take him several hours to talk about another topic, he is repetitive in his questioning. He asked Dr. Toño Summers several time if he can have an infectious disease consult. Discussed with his family the need for an evaluation of his cognitive functioning. Sometimes he refuses to take medications and he has a tendency to be paranoid. We discussed also goals of care as the pt has been asked this admission his wishes for life sustaining measures. His spouse has not been included in that discussion. He does not have an advanced directive/living will document. I do not believe he is decisional. We talked about dementia and the disease trajectory experience of dementia.  The children  believe that they and their mother would not want to allow for life sustaining measures at the end of his life and focusing on his comfort would be most appropriate at that time. But for now he is to remain a full code as he indicated to the physician on admission. 2. Discussed: GOC, full code status  3. Decisions made: continue supportive care and treatments, continue full code  4. Pain/symptom management: will add low dose percocet for pain but pt is obstreperous when it comes to taking medications. 5. Coordination of care: Palliative IDT, primary nurse, case management  6. Psychosocial and emotional aspects of care: provided emotional support, active listening and non-abandonment  7. Disposition: most likely back home     Goals of Care:        TREATMENT PREFERENCES:   Code Status: Full Code    Advance Care Planning:  Advance Care Planning 2/16/2017   Patient's Healthcare Decision Maker is: Legal Next of Jessica Brunson   Primary Decision Maker Name Shanta Reeves   Primary Decision Maker Phone Number [phone]   Primary Decision Maker Relationship to Patient Spouse   Confirm Advance Directive None       Other:    The palliative care team has discussed with patient / health care proxy about goals of care / treatment preferences for patient.  [====Goals of Care====]           Resuscitation Status: Full Code   Durable DNR Status: na  Initial Consult Note routed to PCP? [x] Yes   [] No    [] No PCP listed          Thank you for including Palliative Medicine in this patient's care. Ronnie Crystal NP           HISTORY:     History obtained from: chart, physician, nursing    CHIEF COMPLAINT: pain      HISTORY OF PRESENT ILLNESS:   Mr. Eliane Coto is a 78 y.o.  male who is admitted with cellulitis. Mr. Eliane Coto presented to the Emergency Department this PM complaining of pain: right foot, for the last 3 days, constant, moderate to severe, associated with redness and swelling.         The patient is: [x] Verbal / [] Nonverbal / [] Unresponsive                FUNCTIONAL ASSESSMENT: Palliative Performance Scale (PPS):  PPS: 50         PSYCHOSOCIAL/SPIRITUAL SCREENING:     Advance Care Planning:  Advance Care Planning 2/16/2017   Patient's Healthcare Decision Maker is: Legal Next of Jessica Brunson   Primary Decision Maker Name King Johnson   Primary Decision Maker Phone Number [phone]   Primary Decision Maker Relationship to Patient Spouse   Confirm Advance Directive None        Any spiritual / Gnosticist concerns:  [] Yes /  [x] No    Caregiver Burnout:  [] Yes /  [x] No /  [] No Caregiver Present      Anticipatory grief assessment:   [x] Normal  / [] Maladaptive       ESAS Anxiety: Anxiety: 3    ESAS Depression: Depression: 0                 REVIEW OF SYSTEMS:     The following systems were [x] reviewed / [] unable to be reviewed  Systems: constitutional, ears/nose/mouth/throat, respiratory, gastrointestinal, musculoskeletal, neurologic, psychiatric, endocrine. Positive findings noted below. Modified ESAS Completed by: provider   Fatigue: 2 Drowsiness: 0   Depression: 0 Pain: 5   Anxiety: 3 Nausea: 0   Anorexia: 0 Dyspnea: 0     Constipation: No                    Functional Assessment Staging (FAST) for dementia:         Functional Status  ECOG Status : Ambulatory, but unable to carry out work activities           Confusion Assessment Method Diagnostic Algorithm (CAM):    CAM Score: 3        Clinical Pain Assessment (nonverbal scale for severity on nonverbal patients):   [++++ Clinical Pain Assessment++++]  [++++Pain Severity++++]: Pain: 5  [++++Pain Character++++]:   [++++Pain Duration++++]:   [++++Pain Effect++++]:   [++++Pain Factors++++]:   [++++Pain Frequency++++]:   [++++Pain Location++++]:   [++++ Clinical Pain Assessment++++]       PHYSICAL EXAM:     Wt Readings from Last 3 Encounters:   02/14/17 260 lb (117.9 kg)   03/01/16 275 lb 9.6 oz (125 kg)   12/11/15 274 lb (124.3 kg)     Blood pressure 173/75, pulse 88, temperature 98.2 °F (36.8 °C), resp.  rate 18, height 5' 11\" (1.803 m), weight 260 lb (117.9 kg), SpO2 94 %.   Pain:  Pain Scale 1: Numeric (0 - 10)  Pain Intensity 1: 0  Pain Onset 1: acute  Pain Location 1: Leg  Pain Orientation 1: Right  Pain Description 1: Aching  Pain Intervention(s) 1: Medication (see MAR)  Last bowel movement:         Last bowel movement:     Constitutional: pt is awake alert confused  Eyes: pupils equal, anicteric  ENMT: no nasal discharge, moist mucous membranes  Cardiovascular: regular rhythm, distal pulses intact  Respiratory: breathing not labored, symmetric  Gastrointestinal: soft non-tender, +bowel sounds  Musculoskeletal: no deformity, no tenderness to palpation  Skin: warm, dry  Neurologic:pt is able to follow commands, pt is moving all extremities  Psychiatric: some  confusion        HISTORY:     Principal Problem:    Cellulitis (2/14/2017)    Active Problems:    HTN (hypertension) (4/15/2011)      Overview: Dr. Winkler Sons at [address] [facility]            DM type 2 causing renal disease ([facility] (4/15/2011)      Obesity (4/15/2011)      PABLO (obstructive sleep apnea) (4/15/2011)      Overview: CPAP      CAD (coronary artery disease) (4/15/2011)      Overview: Cath 2008 -sig RCA stenosis      Pure hypercholesterolemia (4/15/2011)      CKD (chronic kidney disease) stage 4, GFR 15-29 ml/min ([facility]) (2/14/2017)      Dementia (2/15/2017)      Anxiety (2/15/2017)      Aortic stenosis ()      Pulmonary hypertension (HCC) ()      Past Medical History   Diagnosis Date    Aortic stenosis     CAD (coronary artery disease)     CKD (chronic kidney disease) stage 4, GFR 15-29 ml/min ([facility] [address] 2/14/2017    Diabetes ([facility] [address]     Hypertension     Pulmonary hypertension (HCC)       Past Surgical History   Procedure Laterality Date    Pr cardiac surg procedure unlist       cardiac cath without stents x3      Family History   Problem Relation Age of Onset    Diabetes Father       Social History   Substance Use Topics    Smoking status: Never Smoker    Smokeless tobacco: Never Used    Alcohol use 0.0 oz/week     0 Standard drinks or equivalent per week      Comment: Rare     Allergies   Allergen Reactions    Albuterol Other (comments)     patient reports that albuterol gives him phlegm and makes it harder to breathe    Norvasc [Amlodipine] Shortness of Breath    Pcn [Penicillins] Shortness of Breath     Just started course of PCN and hydrocodone on Monday      Current Facility-Administered Medications   Medication Dose Route Frequency    aspirin chewable tablet 81 mg  81 mg Oral DAILY    carvedilol (COREG) tablet 12.5 mg  12.5 mg Oral BID WITH MEALS    [START ON 2/17/2017] lisinopril (PRINIVIL, ZESTRIL) tablet 2.5 mg  2.5 mg Oral DAILY    sodium chloride (NS) flush 5-10 mL  5-10 mL IntraVENous Q8H    sodium chloride (NS) flush 5-10 mL  5-10 mL IntraVENous PRN    acetaminophen (TYLENOL) tablet 650 mg  650 mg Oral Q4H PRN    oxyCODONE-acetaminophen (PERCOCET) 5-325 mg per tablet 1 Tab  1 Tab Oral Q4H PRN    HYDROmorphone (PF) (DILAUDID) injection 0.5 mg  0.5 mg IntraVENous Q4H PRN    zolpidem (AMBIEN) tablet 5 mg  5 mg Oral QHS PRN    insulin lispro (HUMALOG) injection   SubCUTAneous AC&HS    glucose chewable tablet 16 g  4 Tab Oral PRN    dextrose (D50W) injection syrg 12.5-25 g  12.5-25 g IntraVENous PRN    glucagon (GLUCAGEN) injection 1 mg  1 mg IntraMUSCular PRN    heparin (porcine) injection 5,000 Units  5,000 Units SubCUTAneous Q8H    vancomycin (VANCOCIN) 1,750 mg in 0.9% sodium chloride 500 mL IVPB  1,750 mg IntraVENous Q24H    prochlorperazine (COMPAZINE) injection 5 mg  5 mg IntraVENous Q6H PRN          LAB AND IMAGING FINDINGS:     Lab Results   Component Value Date/Time    WBC 7.1 02/16/2017 06:56 AM    HGB 10.8 02/16/2017 06:56 AM    PLATELET 789 33/38/0239 06:56 AM     Lab Results   Component Value Date/Time    Sodium 136 02/16/2017 06:56 AM    Potassium 4.2 02/16/2017 06:56 AM    Chloride 100 02/16/2017 06:56 AM    CO2 25 02/16/2017 06:56 AM    BUN 29 02/16/2017 06:56 AM Creatinine 2.10 02/16/2017 06:56 AM    Calcium 8.2 02/16/2017 06:56 AM    Calcium 8.2 02/16/2017 06:56 AM    Magnesium 2.0 02/16/2017 06:56 AM    Phosphorus 3.6 02/16/2017 06:56 AM      Lab Results   Component Value Date/Time    AST (SGOT) 36 02/14/2017 09:27 PM    Alk. phosphatase 118 02/14/2017 09:27 PM    Protein, total 7.7 02/14/2017 09:27 PM    Albumin 2.7 02/16/2017 06:56 AM    Globulin 4.6 02/14/2017 09:27 PM     Lab Results   Component Value Date/Time    INR 1.0 01/22/2014 07:30 PM    Prothrombin time 10.2 01/22/2014 07:30 PM    aPTT 27.8 01/22/2014 07:30 PM      No results found for: IRON, FE, TIBC, IBCT, PSAT, FERR   No results found for: PH, PCO2, PO2  No components found for: [place]   Lab Results   Component Value Date/Time    CK - MB 5.3 01/22/2014 07:30 PM                Total time: 70  Counseling / coordination time: 45  > 50% counseling / coordination?: y      Prolonged service was provided for  []30 min   []75 min in face to face time in the presence of the patient. Time Start:   Time End:   Note: this can only be billed with 99533 (initial) or 10597 (follow up). If multiple start / stop times, list each separately.

## 2017-02-16 NOTE — CDMP QUERY
This patient has been diagnosed with CKD 4. The following is also documented in the medical record:    * Intact PTH 90.3  * Calcium 8.2  * No treatment yet    Based on your medical judgment, could you please clarify in the progress notes, if this patient has    =>Secondary hyperparathyroidism  =>Other Explanation of clinical findings  =>Unable to Determine (no explanation of clinical findings)    Please clarify and document your clinical opinion in the progress notes and discharge summary including the definitive and/or presumptive diagnosis, (suspected or probable), related to the above clinical findings. Please include clinical findings supporting your diagnosis.     Rafiq HoltSelect [facility], [address] [place] [address]  [email or web address]

## 2017-02-16 NOTE — PROGRESS NOTES
Endocrine Follow-up    77 yo AA male admitted 2/14 with HTN, CAD, Type 2 Diabetes Mellitus, and CKD admitted for R foot cellulitis. He has a history of T2DM x 50 yr, which (given his age, duration of DM, risk of hypoglycemia, and multiple co-morbidities) is fairly well-controlled with an A1c of 7.4% on 2/15/17. We are following him for his diabetes and several significant hypoglycemic episodes shortly after admission, likely related to poor PO intake and taking sulfonylurea. Reviewed chart, including glucoses. Blood sugars improved, no further episodes hypoglycemia. Today (2/16) fasting blood sugar was 114; pre-lunch blood sugar 247. Creatinine 2.33 on 2/15/17. A/P: 77 yo AA male admitted 2/14 with HTN, CAD, Type 2 Diabetes Mellitus, and CKD with R foot cellulitis and several significant hypoglycemic episodes. Hypoglycemia resolved. Pre-lunch blood sugar elevated today. Will initiate Tradjenta 5 mg daily (safe in CKD) today to help with elevated blood sugars. May continue correction insulin while in the hospital.  Continue to stay off sulfonylurea. Please advise patient to schedule follow-up with [facility] within a couple weeks of discharge. May consider adding a GLP-1 agonist at that time to aid weight loss and also improve control of his diabetes. Arleth Monroy PA-C  Plan discussed with Dr. Patria Alvarez.

## 2017-02-16 NOTE — PROGRESS NOTES
Problem: Mobility Impaired (Adult and Pediatric)  Goal: *Acute Goals and Plan of Care (Insert Text)  Physical Therapy Goals  Initiated 2/16/2017  1. Patient will transfer from bed to chair and chair to bed with supervision/set-up using the least restrictive device within 3 day(s). 3. Patient will perform sit to stand with minimal assistance/contact guard assist within 3 day(s). 4. Patient will ambulate with minimal assistance/contact guard assist for 12 feet with the least restrictive device within 7 day(s). 5. Patient will ascend/descend 4 stairs with 1 handrail(s) with minimal assistance/contact guard assist within 3 day(s). PHYSICAL THERAPY EVALUATION  Patient: Rafaela Almanza (73 y.o. male)  Date: 2/16/2017  Primary Diagnosis: Cellulitis        Precautions: fall         ASSESSMENT :  Based on the objective data described below, the patient presents with pain & swelling RLE impacting mobility. Mr Amarilys Aguilar is reluctant to stand or transfer 'it's gonna hurt.'  Very talkative & needs constant redirection. Patient was able to sit mod I, stand with stand-by. Took a few steps with hand held. Declined use of walker at this time, despite explanation as to how it might help reduce his pain & increase his independence. He is fixated upon his GI issues, and believes he cannot sit any length of time without further irritating his LE swelling  Apparently he sits on commode for extensive periods of time (son mentioned up to 6 hours, falling asleep at times). Believe a bariatric bedside commode with drop arms would expedite his care both in short & long term, as it can be placed above his home toilet. A bariatric walker will also be helpful during the duration of his cellulitis, acknowledging he should be up only short periods of time, and always elevated when seated. Explained this to family as well, and am hopefull a later try with walker will be successful. Does not appear to need a hospital bed at this time. Family reports he is very active, and will be back on his feet as soon as he can. Patient will benefit from skilled intervention to address the above impairments. Patients rehabilitation potential is considered to be Good  Factors which may influence rehabilitation potential include:   [ ]         None noted  [X]         Mental ability/status  [ ]         Medical condition  [ ]         Home/family situation and support systems  [ ]         Safety awareness  [ ]         Pain tolerance/management  [ ]         Other:        PLAN :  Recommendations and Planned Interventions:  [X]           Bed Mobility Training             [ ]    Neuromuscular Re-Education  [X]           Transfer Training                   [ ]    Orthotic/Prosthetic Training  [X]           Gait Training                         [ ]    Modalities  [X]           Therapeutic Exercises           [X]    Edema Management/Control  [X]           Therapeutic Activities            [X]    Patient and Family Training/Education  [ ]           Other (comment):     Frequency/Duration: Patient will be followed by physical therapy  5 times a week to address goals. Discharge Recommendations: None  Further Equipment Recommendations for Discharge bariatric drop down bedside commode & bariatric waler       SUBJECTIVE:   Patient stated [place] walk cross country 3x/day.       OBJECTIVE DATA SUMMARY:   HISTORY:    Past Medical History   Diagnosis Date    Aortic stenosis      CAD (coronary artery disease)      CKD (chronic kidney disease) stage 4, GFR 15-29 ml/min ([address] 2/14/2017    Diabetes ([address]      Hypertension      Pulmonary hypertension ([address]       Past Surgical History   Procedure Laterality Date    Pr cardiac surg procedure unlist           cardiac cath without stents x3     Prior Level of Function/Home Situation: lives with wife  Personal factors and/or comorbidities impacting plan of care: obesity, developing dementia, living with elderly wife, CKD, pulm HTN & COPD     Home Situation  Home Environment: Private residence  One/Two Story Residence: One story  Living Alone: No  Support Systems: Family member(s)  Patient Expects to be Discharged to[de-identified] Private residence  Current DME Used/Available at Home: None     EXAMINATION/PRESENTATION/DECISION MAKING:   Critical Behavior:  Compromised safety awareness & decision making              Skin:  Swelling, heat & rubor RLE as expected with cellulitis  Strength:    Strength: Within functional limits (x distal RLE)                    Tone & Sensation:   Tone: Normal              Sensation: Intact               Range Of Motion:  AROM: Within functional limits (x distal RLE)                       Coordination:  Coordination: Within functional limits     Functional Mobility:  Bed Mobility:  Rolling: Independent  Supine to Sit: Modified independent  Sit to Supine: Modified independent  Scooting: Modified independent  Transfers:  Sit to Stand: Stand-by asssistance  Stand to Sit: Stand-by asssistance                       Balance:   Sitting: Intact  Standing: Intact  Ambulation/Gait Training:  Distance (ft): 2 Feet (ft)     Ambulation - Level of Assistance: Minimal assistance        Gait Abnormalities: Antalgic;Trunk sway increased        Base of Support: Widened  Stance: Right decreased  Speed/Ynes: Slow  Step Length: Right shortened;Left shortened                    Functional Measure:     Elder Mobility Scale      12/20            EMS and G-code impairment scale:  Percentage of impairment CH  0% CI  1-19% CJ  20-39% CK  40-59% CL  60-79% CM  80-99% CN  100%   EMS Score 0-20 20 17-19 13-16 9-12 5-8 1-4 0      Scores under 10  generally these patients are dependent in mobility maneuvers; require help with  basic ADL, such as transfers, toileting and dressing.      Scores between 10  13  generally these patients are borderline in terms of safe mobility and  independence in ADL i.e. they require some help with some mobility maneuvers. Scores over 14  Generally these patients are able to perform mobility maneuvers alone and safely  and are independent in basic ADL. G codes: In compliance with CMSs Claims Based Outcome Reporting, the following G-code set was chosen for this patient based on their primary functional limitation being treated: The outcome measure chosen to determine the severity of the functional limitation was the elderly mobility scale with a score of 12/20 which was correlated with the impairment scale. · Mobility - Walking and Moving Around:               - CURRENT STATUS:    CK - 40%-59% impaired, limited or restricted               - GOAL STATUS:           CI - 1%-19% impaired, limited or restricted               - D/C STATUS:                       ---------------To be determined---------------      Physical Therapy Evaluation Charge Determination   History Examination Presentation Decision-Making   HIGH Complexity :3+ comorbidities / personal factors will impact the outcome/ POC  LOW Complexity : 1-2 Standardized tests and measures addressing body structure, function, activity limitation and / or participation in recreation  LOW Complexity : Stable, uncomplicated  LOW Complexity : FOTO score of       Based on the above components, the patient evaluation is determined to be of the following complexity level: LOW      Pain: no complaint during intervention  Pain Scale 1: Numeric (0 - 10)  Pain Intensity 1: 0  Pain Location 1: Leg  Pain Orientation 1: Right  Pain Description 1: Aching  Pain Intervention(s) 1: Medication (see MAR)  Activity Tolerance:   Self limits  Please refer to the flowsheet for vital signs taken during this treatment.   After treatment:   [ ]         Patient left in no apparent distress sitting up in chair  [X]         Patient left in no apparent distress in bed  [X]         Call bell left within reach  [X]         Nursing notified  [X] Family/Caregiver present  [ ]         Bed alarm activated      COMMUNICATION/EDUCATION:   The patients plan of care was discussed with: Occupational Therapist, Registered Nurse,  and MD & family members. [ ]         Fall prevention education was provided and the patient/caregiver indicated understanding. [ ]         Patient/family have participated as able in goal setting and plan of care. [ ]         Patient/family agree to work toward stated goals and plan of care. [ ]         Patient understands intent and goals of therapy, but is neutral about his/her participation. [ ]         Patient is unable to participate in goal setting and plan of care.      Thank you for this referral.  Sweta Rodriges, PT   Time Calculation: 45 mins

## 2017-02-16 NOTE — CONSULTS
Butch Romero MD, [facility]  Office [phone]    Date of  Admission: 2/14/2017  7:38 PM         IMPRESSION and RECOMMENDATIONS     1. AS:  Mild to moderate on echo. Not severe on exam.  No active cardiac issues. Will need chronic f/u. Had seen Dr. Ralph Bustillos for this in past.  He can f/u with him or me. 2.  CAD:  H/O stent 2008. Resume ASA. Check lipids. 3.  Edema:  Likely in part due to venous insuff as well as moderate pulm HTN noted on echo. Norvasc started for BP Tx: watch for worsening edema. I have discussed this plan with the patient. He appears to understand this plan and wishes to proceed ahead.            Problem List  Date Reviewed: 2/16/2017          Codes Class Noted    Dementia ICD-10-CM: F03.90  ICD-9-CM: 294.20  2/15/2017        Anxiety ICD-10-CM: F41.9  ICD-9-CM: 300.00  2/15/2017        Aortic stenosis ICD-10-CM: I35.0  ICD-9-CM: 424.1  Unknown        Pulmonary hypertension ([facility] [address] ICD-10-CM: I27.2  ICD-9-CM: 416.8  Unknown        * (Principal)Cellulitis ICD-10-CM: L03.90  ICD-9-CM: 682.9  2/14/2017        CKD (chronic kidney disease) stage 4, GFR 15-29 ml/min (HCC) (Chronic) ICD-10-CM: N18.4  ICD-9-CM: 585.4  2/14/2017        HTN (hypertension) (Chronic) ICD-10-CM: I10  ICD-9-CM: 401.9  4/15/2011    Overview Signed 4/15/2011 11:43 AM by MD Dr. Nikkie Ryan at [facility]               DM type 2 causing renal disease ([facility] [address] ICD-10-CM: E11.29  ICD-9-CM: 250.40  4/15/2011        Obesity (Chronic) ICD-10-CM: E66.9  ICD-9-CM: 278.00  4/15/2011        PABLO (obstructive sleep apnea) (Chronic) ICD-10-CM: Q49.71  ICD-9-CM: 327.23  4/15/2011    Overview Signed 4/15/2011 11:47 AM by Meagan Gonzalez MD     CPAP             CAD (coronary artery disease) (Chronic) ICD-10-CM: I25.10  ICD-9-CM: 414.00  4/15/2011    Overview Signed 4/15/2011 11:44 AM by Meagan Gonzalez MD     Cath 2008 -sig RCA stenosis             Pure hypercholesterolemia (Chronic) ICD-10-CM: E78.00  ICD-9-CM: 272.0  4/15/2011              History of Present Illness:     Edinson Peñaloza is a 78 y.o. male with the above problem list who was admitted for Cellulitis. Pt presents with LE edema (R >> L). As well as right foot cellulitis manifest pain pain, increased warmth, and erythema. Notes that he may have stepped on a [place] nut shell around 1 month ago which became lodged in his foot. Feels better today \"after resting\". He denies chest pain/discomfort, shortness of breath, dyspnea on exertion, orthopnea, paroxysmal noctural dyspnea, palpitations, syncope, or near-syncope.     Current Facility-Administered Medications   Medication Dose Route Frequency    amLODIPine (NORVASC) tablet 10 mg  10 mg Oral DAILY    sodium chloride (NS) flush 5-10 mL  5-10 mL IntraVENous Q8H    sodium chloride (NS) flush 5-10 mL  5-10 mL IntraVENous PRN    acetaminophen (TYLENOL) tablet 650 mg  650 mg Oral Q4H PRN    oxyCODONE-acetaminophen (PERCOCET) 5-325 mg per tablet 1 Tab  1 Tab Oral Q4H PRN    HYDROmorphone (PF) (DILAUDID) injection 0.5 mg  0.5 mg IntraVENous Q4H PRN    zolpidem (AMBIEN) tablet 5 mg  5 mg Oral QHS PRN    insulin lispro (HUMALOG) injection   SubCUTAneous AC&HS    glucose chewable tablet 16 g  4 Tab Oral PRN    dextrose (D50W) injection syrg 12.5-25 g  12.5-25 g IntraVENous PRN    glucagon (GLUCAGEN) injection 1 mg  1 mg IntraMUSCular PRN    heparin (porcine) injection 5,000 Units  5,000 Units SubCUTAneous Q8H    vancomycin (VANCOCIN) 1,750 mg in 0.9% sodium chloride 500 mL IVPB  1,750 mg IntraVENous Q24H    prochlorperazine (COMPAZINE) injection 5 mg  5 mg IntraVENous Q6H PRN      Allergies   Allergen Reactions    Albuterol Other (comments)     patient reports that albuterol gives him phlegm and makes it harder to breathe    Norvasc [Amlodipine] Shortness of Breath    Pcn [Penicillins] Shortness of Breath     Just started course of PCN and hydrocodone on Monday      Family History   Problem Relation Age of Onset    Diabetes Father       Social History     Social History    Marital status:      Spouse name: N/A    Number of children: N/A    Years of education: N/A     Occupational History    Not on file. Social History Main Topics    Smoking status: Never Smoker    Smokeless tobacco: Never Used    Alcohol use 0.0 oz/week     0 Standard drinks or equivalent per week      Comment: Rare    Drug use: No    Sexual activity: Not on file     Other Topics Concern    Not on file     Social History Narrative       Physical Exam:     Patient Vitals for the past 16 hrs:   BP Temp Pulse Resp SpO2   02/16/17 0616 163/78 98 °F (36.7 °C) 76 18 95 %   02/15/17 2225 169/77 97.4 °F (36.3 °C) 76 18 97 %   02/15/17 1711 187/84 - - - -       HEENT Exam:     Normocephalic, atraumatic. EOMI. Oropharynx negative. Neck supple. No lymphadenopathy. Lung Exam:     The patient is not dyspneic. There is no cough. Breath sounds are heard equally in all lung fields. There are no wheezes, rales, rhonchi, or rubs heard on auscultation. Heart Exam:     The rhythm is regular. The PMI is in the 5th intercostal space of the MCL. Apical impulse is normal. S1 is regular. S2 is physiologic. There is no S3, S4 gallop, click, or rub. 3/6 decrescendo SM @ RUSB with local radiation. 1-2/6 DM @ RUSB without radiation. Abdomen Exam:     Bowel sounds are normoactive. Abdomen soft in all quadrants. No tenderness. No palpable masses. No organomegaly. No hernias noted. No bruits or pulsatile mass. Extremities Exam:      There is no clubbing, cyanosis, ulcers, varicose veins noted in the extremities. The neurovascular status is grossly intact with normal distal sensation and pulses. Mile LLE and mod RLE edema         Vascular Exam:     The radial, brachial,are equal and strong bilaterally The carotids are equal bilaterally without bruits. Labs:     Lab Results   Component Value Date/Time    Glucose 80 02/16/2017 06:56 AM    Sodium 136 02/16/2017 06:56 AM    Potassium 4.2 02/16/2017 06:56 AM    Chloride 100 02/16/2017 06:56 AM    CO2 25 02/16/2017 06:56 AM    BUN 29 02/16/2017 06:56 AM    Creatinine 2.10 02/16/2017 06:56 AM    Calcium 8.2 02/16/2017 06:56 AM     Recent Labs      02/16/17   0656  02/15/17   0510   WBC  7.1  7.5   HGB  10.8*  10.4*   HCT  31.6*  30.7*   PLT  309  309     Recent Labs      02/16/17   0656  02/14/17   2127   SGOT   --   36   ALT   --   38   AP   --   118*   TBILI   --   1.1*   TP   --   7.7   ALB  2.7*  3.1*   GLOB   --   4.6*     No results for input(s): INR, PTP, APTT in the last 72 hours. No lab exists for component: INREXT   No results for input(s): CPK, CKMB, TNIPOC in the last 72 hours. No lab exists for component: TROPONINI, ITNL  No results for input(s): TROIQ in the last 72 hours. Lab Results   Component Value Date/Time    Cholesterol, total 249 12/11/2015 11:24 AM    HDL Cholesterol 58 12/11/2015 11:24 AM    LDL, calculated 168 12/11/2015 11:24 AM    Triglyceride 117 12/11/2015 11:24 AM       EKG:  None    Echo:    SUMMARY:  Left ventricle: Systolic function was mildly reduced. Ejection fraction  was estimated to be 50 %. No obvious wall motion abnormalities identified  in the views obtained. Wall thickness was mildly increased. There was mild  concentric hypertrophy. Left atrium: The atrium was mildly dilated. Right atrium: The atrium was mildly dilated. Aortic valve: The valve was trileaflet. Leaflets exhibited moderately  increased thickness, moderate calcification, moderately reduced cuspal  separation, reduced mobility, and sclerosis. There was mild to moderate  stenosis. There was mild regurgitation. Valve peak gradient was 44 mmHg. Valve mean gradient was 27 mmHg. Tricuspid valve: There was mild regurgitation. Pulmonary artery systolic  pressure: 61 mmHg.  There was moderate pulmonary hypertension. Pulmonic valve: There was mild regurgitation.

## 2017-02-16 NOTE — INTERDISCIPLINARY ROUNDS
Interdisciplinary team rounds were held 2/15/2017 with the following team members:Nursing, Nutrition, , Physical Therapy, Clinical Coordinator and Physician. Plan of care discussed. See clinical pathway and/or care plan for interventions and desired outcomes.     Anticipated discharge: tomorrow

## 2017-02-16 NOTE — PROGRESS NOTES
Qasim Santoselsen [facility]  [address] [facility], [address] [place] [address]  [phone]      Medical Progress Note      NAME: Tamika Berry :  1940  MRM:  [id number]    Date/Time: 2017  11:49 AM       Assessment and Plan:     Cellulitis - POA, RLE, maybe. Could just be general redness from skin damage from edema. NO SIRS criteria on admission. Getting empiric vancomycin. Checking MRSA screen. If negative screen and cx, change to empiric keflex. Check foot xray for foreign body    CKD (chronic kidney disease) stage 4 / hyponatremia - Cr a bit lower than baseline, due to non-compliance with fluid restriction. Suspect CHF. Consulted renal.  Avoid nephrotoxins. DM type 2 causing renal, vascular and neurological disease / Hypoglycemia - Acutely low BG overnight. Diabetic diet and counseling. SSI per protocol. Hold home glipizide. Check A1c. Consulted endocrinology. Start low dose ACE    CAD (coronary artery disease) / HTN (hypertension) - No acute signs. Check ECHO. After renal eval, start BB, ACE. Could start diuretics as they recommend. STOP norvasc, due to LE edema        PABLO (obstructive sleep apnea) / Obesity - Consult pulm to arrange re-testing and CPAP. Anemia - Due to chronic kidney disease. Pure hypercholesterolemia - Check lipid panel. Not on statin    Secondary hyperparathyroidism - Renal to manage, due to CKD    Dementia / Anxiety - Noted on interview. Not on meds. As outpatient needs neuropsych consult. Palliative care consult now. Subjective:     Chief Complaint:  Painful RLE persists, perseverating, tangential    ROS:  (bold if positive, if negative)      Tolerating minimal PT Tolerating Diet        Objective:     Last 24hrs VS reviewed since prior progress note.  Most recent are:    Visit Vitals    /75 (BP 1 Location: Left arm, BP Patient Position: At rest)    Pulse 88    Temp 98.2 °F (36.8 °C)    Resp 18    Ht 5' 11\" (1.803 m)  Wt 117.9 kg (260 lb)    SpO2 94%    BMI 36.26 kg/m2     SpO2 Readings from Last 6 Encounters:   02/16/17 94%   12/11/15 97%   01/22/14 100%   05/05/11 98%   04/15/11 98%            Intake/Output Summary (Last 24 hours) at 02/16/17 1149  Last data filed at 02/16/17 5524   Gross per 24 hour   Intake                0 ml   Output             1999 ml   Net            -1999 ml        Physical Exam:    Gen:  Obese, in no acute distress  HEENT:  Pink conjunctivae, PERRL, hearing intact to voice, moist mucous membranes  Neck:  Supple, without masses, thyroid non-tender  Resp:  No accessory muscle use, distant breath sounds without wheezes rales or rhonchi  Card:  No murmurs, tachycardic S1, S2 without thrills, bruits, R>L 2-4+ peripheral edema  Abd:  Soft, non-tender, non-distended, normoactive bowel sounds are present, no mass  Lymph:  No cervical or inguinal adenopathy  Musc:  No cyanosis or clubbing  Skin:  No rashes or ulcers, skin turgor is good  Neuro:  Cranial nerves are grossly intact, general motor weakness, follows commands vaguely  Psych:  Poor insight, oriented to person, place, pressured, anxious, tangential    Telemetry reviewed:   normal sinus rhythm  __________________________________________________________________  Medications Reviewed: (see below)  Medications:     Current Facility-Administered Medications   Medication Dose Route Frequency    aspirin chewable tablet 81 mg  81 mg Oral DAILY    carvedilol (COREG) tablet 12.5 mg  12.5 mg Oral BID WITH MEALS    sodium chloride (NS) flush 5-10 mL  5-10 mL IntraVENous Q8H    sodium chloride (NS) flush 5-10 mL  5-10 mL IntraVENous PRN    acetaminophen (TYLENOL) tablet 650 mg  650 mg Oral Q4H PRN    oxyCODONE-acetaminophen (PERCOCET) 5-325 mg per tablet 1 Tab  1 Tab Oral Q4H PRN    HYDROmorphone (PF) (DILAUDID) injection 0.5 mg  0.5 mg IntraVENous Q4H PRN    zolpidem (AMBIEN) tablet 5 mg  5 mg Oral QHS PRN    insulin lispro (HUMALOG) injection SubCUTAneous AC&HS    glucose chewable tablet 16 g  4 Tab Oral PRN    dextrose (D50W) injection syrg 12.5-25 g  12.5-25 g IntraVENous PRN    glucagon (GLUCAGEN) injection 1 mg  1 mg IntraMUSCular PRN    heparin (porcine) injection 5,000 Units  5,000 Units SubCUTAneous Q8H    vancomycin (VANCOCIN) 1,750 mg in 0.9% sodium chloride 500 mL IVPB  1,750 mg IntraVENous Q24H    prochlorperazine (COMPAZINE) injection 5 mg  5 mg IntraVENous Q6H PRN        Lab Data Reviewed: (see below)  Lab Review:     Recent Labs      02/16/17   0656  02/15/17   0510  02/14/17 2127   WBC  7.1  7.5  9.9   HGB  10.8*  10.4*  11.5*   HCT  31.6*  30.7*  34.3*   PLT  309  309  354     Recent Labs      02/16/17   0656  02/15/17   0510  02/14/17   2127   NA  136  132*  132*   K  4.2  3.9  4.4   CL  100  96*  94*   CO2  25  28  26   GLU  80  67  36*   BUN  29*  35*  35*   CREA  2.10*  2.33*  2.39*   CA  8.2*  8.2*  8.6  9.0   MG  2.0  2.1   --    PHOS  3.6  3.5   --    ALB  2.7*   --   3.1*   TBILI   --    --   1.1*   SGOT   --    --   36   ALT   --    --   38     Lab Results   Component Value Date/Time    Glucose (POC) 247 02/16/2017 11:28 AM    Glucose (POC) 114 02/16/2017 07:58 AM    Glucose (POC) 79 02/16/2017 07:37 AM    Glucose (POC) 100 02/15/2017 09:03 PM    Glucose (POC) 76 02/15/2017 08:43 PM    Glucose (POC) 218 05/01/2011 01:16 PM     No results for input(s): PH, PCO2, PO2, HCO3, FIO2 in the last 72 hours. No results for input(s): INR in the last 72 hours. No lab exists for component: Vena Geronimo  All Micro Results     Procedure Component Value Units Date/Time    CULTURE, MRSA [[id number]] Collected:  02/16/17 9378    Order Status:  Completed Specimen:  Nares Updated:  02/16/17 1111          I have reviewed notes of prior 24hr.     Other pertinent lab: none    Total time spent with patient: [address] discussed with: Patient, Family, Care Manager, Nursing Staff, Consultant/Specialist and >50% of time spent in counseling and coordination of care    Discussed:  Care Plan and D/C Planning    Prophylaxis:  Hep SQ and H2B/PPI    Disposition:  SNF/LTC           ___________________________________________________    Attending Physician: Anni Landin MD

## 2017-02-16 NOTE — CONSULTS
Qasim Vasquez [facility] 79   [address] [facility], [address] [place] [address]   [address] [place] [address] Road       Name:  Princess Heredia   MR#:  [id number]   :  1940   Account #:  [de-identified]    Date of Consultation:  02/15/2017   Date of Adm:  2017       REASON FOR CONSULTATION: I am asked to see this very pleasant   81-year-old gentleman for evaluation and management of his chronic   kidney disease. HISTORY OF PRESENT ILLNESS: He currently presents to the   emergency room with increasing pain, swelling and erythema of his   right lower leg. On evaluation in the emergency room, it was found that   he had what appeared to be cellulitis which was severe and he is thus   admitted for further evaluation. The patient has a known history of chronic kidney disease and has   been followed for quite some time by my partner, Dr. Norberto Jolly,   for his chronic kidney disease. It appears that over time his serum   creatinine has fluctuated up and down for a number of years, but ever   since  it appears that his baseline serum creatinine is somewhere   around 2.0-2.5, and when not effected by secondary complications it   has been stable. He does have a long-standing history of chronic   kidney disease, which is probably related to nephrosclerosis. He does   have a history of benign prostatic hypertrophy, nephrolithiasis and a   traumatic injury to his right kidney while being a teenager. Additionally,   it was found that he had right-sided hydronephrosis in  and   underwent stent placement, exact etiology of the right-sided   obstruction was never clearly identified and it would appear based on   review of records that his serum creatinine has been fairly stable over   the years unless there has been a secondary complication associated   with dehydration. He has had a cardiac catheterization in the past with   no reported history of contrast related nephrotoxicity.  Of note, those   cardiac catheterization apparently showed no significant coronary   artery disease. He does have a history of diabetes mellitus with   apparent history of retinopathy, but available records do not indicate   any significant degree of proteinuria. MEDICATIONS ON ADMISSION   That is to be determined as unknown. Our records indicate that he was   takin. Norvasc. 2. Chlorthalidone. 3. Lasix. 4. Sodium bicarbonate supplements, but yet we have no clear reliable   history of what he was taking prior to admission. 5. He says he was given some type of antibiotics, but he does not   remember the name nor dosage. ALLERGIES: NONE REPORTED. REVIEW OF SYSTEMS: This seems to be basically unreliable. He   waxes and wanes about any number of things and it is hard to get a   clear chronology of current events, much less what happened in the   past.     PAST MEDICAL HISTORY: This is remarkable for chronic kidney   disease as noted above, dyslipidemia, benign prostatic hypertrophy,   hypertension, nephrolithiasis, diabetes mellitus with possible history of   diabetic retinopathy, morbid obesity, obstructive sleep apnea, and   chronic right upper lobe granulomatous disease on chest x-rays. He   has also had problems with right-sided hydronephrosis, the etiology   which is not entirely clear, cytology studies have proved to be negative,   and in the past he has required stenting of the right kidney with   subsequent evaluation and followup are not documented in our   medical records and he has no recollection of those results. PERSONAL AND SOCIAL HISTORY: He does not smoke or drink. FAMILY HISTORY: Negative for any history of kidney disease. PHYSICAL EXAMINATION   GENERAL: The patient is a very pleasant 70-year-old gentleman who   is awake, alert, oriented and in no distress whatsoever.    VITAL SIGNS: Temperature is 97.4, pulse 82, respiratory rate 18,   blood pressure 160/69, weight is recorded at 117.9 kilograms. SKIN/LYMPHATIC: There are no obvious rashes or adenopathy. HEENT: Pupils were mid position and poorly reactive. There did not   appear to be any extraocular muscle movement problems, and   otherwise examination proved to be unrevealing. NECK: Supple. There is no jugular venous distention. Trachea is   midline. LUNGS: Clear. No wheezes or rhonchi. HEART: Regular rate and rhythm. ABDOMEN: Completely benign, no masses or organ enlargement. BACK/EXTREMITIES: Likewise no notable acute findings were noted. No cyanosis or clubbing. NEUROLOGIC: I did not test this extensively. His mental status   appeared to be fairly normal, but on retrospect obtaining a clear history   was difficult. He tended to ramble, did not stay on focus for directed   questions, but overall my impression was that this was probably his   usual behavior, and as such it proved difficult to provide a clear-cut   history due to his rambling conversation which did not seem to stay   on point. IMPRESSION   1. Chronic kidney disease. 2. Hypertension. 3. Anemia of chronic disease. 4. History of diabetes mellitus. 5. Morbid obesity. 6. Obstructive sleep apnea. 7. Right lower extremity cellulitis. Currently on intravenous antibiotics,   will consider change to p.o. and subsequent discharge. DISCUSSION AND RECOMMENDATIONS: At the present time, I do   not think we need to pursue any intense workup as regards to his   kidney function, but rather focus on management of his chronic   disease. He actually seems to be fairly stable overall and provided that   nothing new arises or any change occurs, I thank he could probably be   discharged home with outpatient followup. Thank you for allowing me to participate in the patient's care.         MD ISHAAN García / LUCIA   D:  02/15/2017   20:54   T:  02/15/2017   22:03   Job #:  [id number]

## 2017-02-16 NOTE — DIABETES MGMT
NURSING: HYPOGLYCEMIC RISK ASSESSMENT    Jesus Alberto Sousa. has an increased risk for hypoglycemia due to to the following conditions: low glucose upon admission    Please continue to monitor BG levels, document po intake and follow the hypoglycemia protocol for treatment. Please document treatment, rechecked value, and physician notified in progress notes. You can use the smart text \". hypoglyce\" to document each episode. Any questions please call [facility] at [phone] (pager). Thank you. Minoo Leyva.  Carlene Kwok RN, BSN, MPH  Diabetes [address] [place] [address]

## 2017-02-16 NOTE — ROUTINE PROCESS
Bedside and Verbal shift change report given to [address] [place] [address] (oncoming nurse) by Justine Mathur (offgoing nurse). Report included the following information SBAR and Kardex.

## 2017-02-17 ENCOUNTER — APPOINTMENT (OUTPATIENT)
Dept: GENERAL RADIOLOGY | Age: 77
DRG: 603 | End: 2017-02-17
Attending: INTERNAL MEDICINE
Payer: MEDICARE

## 2017-02-17 ENCOUNTER — APPOINTMENT (OUTPATIENT)
Dept: MRI IMAGING | Age: 77
DRG: 603 | End: 2017-02-17
Attending: INTERNAL MEDICINE
Payer: MEDICARE

## 2017-02-17 LAB
BACTERIA SPEC CULT: NORMAL
BACTERIA SPEC CULT: NORMAL
CREAT SERPL-MCNC: 2.11 MG/DL (ref 0.7–1.3)
DATE LAST DOSE: ABNORMAL
GLUCOSE BLD STRIP.AUTO-MCNC: 105 MG/DL (ref 65–100)
GLUCOSE BLD STRIP.AUTO-MCNC: 106 MG/DL (ref 65–100)
GLUCOSE BLD STRIP.AUTO-MCNC: 113 MG/DL (ref 65–100)
GLUCOSE BLD STRIP.AUTO-MCNC: 138 MG/DL (ref 65–100)
GLUCOSE BLD STRIP.AUTO-MCNC: 156 MG/DL (ref 65–100)
GLUCOSE BLD STRIP.AUTO-MCNC: 162 MG/DL (ref 65–100)
GLUCOSE BLD STRIP.AUTO-MCNC: 77 MG/DL (ref 65–100)
GLUCOSE BLD STRIP.AUTO-MCNC: 79 MG/DL (ref 65–100)
GLUCOSE BLD STRIP.AUTO-MCNC: 89 MG/DL (ref 65–100)
REPORTED DOSE,DOSE: ABNORMAL UNITS
REPORTED DOSE/TIME,TMG: 2232
SERVICE CMNT-IMP: ABNORMAL
SERVICE CMNT-IMP: NORMAL
VANCOMYCIN TROUGH SERPL-MCNC: 21.2 UG/ML (ref 5–10)

## 2017-02-17 PROCEDURE — 74011250637 HC RX REV CODE- 250/637: Performed by: PHYSICIAN ASSISTANT

## 2017-02-17 PROCEDURE — 82565 ASSAY OF CREATININE: CPT | Performed by: INTERNAL MEDICINE

## 2017-02-17 PROCEDURE — 74011250637 HC RX REV CODE- 250/637: Performed by: INTERNAL MEDICINE

## 2017-02-17 PROCEDURE — 70030 X-RAY EYE FOR FOREIGN BODY: CPT

## 2017-02-17 PROCEDURE — 36415 COLL VENOUS BLD VENIPUNCTURE: CPT | Performed by: INTERNAL MEDICINE

## 2017-02-17 PROCEDURE — 73718 MRI LOWER EXTREMITY W/O DYE: CPT

## 2017-02-17 PROCEDURE — 82962 GLUCOSE BLOOD TEST: CPT

## 2017-02-17 PROCEDURE — 74011250637 HC RX REV CODE- 250/637: Performed by: SPECIALIST

## 2017-02-17 PROCEDURE — 65270000029 HC RM PRIVATE

## 2017-02-17 PROCEDURE — 74011250636 HC RX REV CODE- 250/636: Performed by: INTERNAL MEDICINE

## 2017-02-17 RX ORDER — CEPHALEXIN 500 MG/1
500 CAPSULE ORAL EVERY 6 HOURS
Qty: 40 CAP | Refills: 0 | Status: SHIPPED | OUTPATIENT
Start: 2017-02-17 | End: 2017-02-18

## 2017-02-17 RX ORDER — VANCOMYCIN/0.9 % SOD CHLORIDE 1.5G/250ML
1500 PLASTIC BAG, INJECTION (ML) INTRAVENOUS EVERY 24 HOURS
Status: DISCONTINUED | OUTPATIENT
Start: 2017-02-17 | End: 2017-02-17

## 2017-02-17 RX ORDER — CEPHALEXIN 250 MG/1
500 CAPSULE ORAL EVERY 6 HOURS
Status: DISCONTINUED | OUTPATIENT
Start: 2017-02-17 | End: 2017-02-18 | Stop reason: HOSPADM

## 2017-02-17 RX ORDER — CARVEDILOL 12.5 MG/1
12.5 TABLET ORAL 2 TIMES DAILY WITH MEALS
Qty: 60 TAB | Refills: 2 | Status: SHIPPED | OUTPATIENT
Start: 2017-02-17 | End: 2017-05-18

## 2017-02-17 RX ADMIN — CARVEDILOL 12.5 MG: 12.5 TABLET, FILM COATED ORAL at 17:01

## 2017-02-17 RX ADMIN — Medication 10 ML: at 13:28

## 2017-02-17 RX ADMIN — HEPARIN SODIUM 5000 UNITS: 5000 INJECTION, SOLUTION INTRAVENOUS; SUBCUTANEOUS at 09:30

## 2017-02-17 RX ADMIN — CEPHALEXIN 500 MG: 250 CAPSULE ORAL at 17:01

## 2017-02-17 RX ADMIN — CARVEDILOL 12.5 MG: 12.5 TABLET, FILM COATED ORAL at 09:30

## 2017-02-17 RX ADMIN — Medication 10 ML: at 06:46

## 2017-02-17 RX ADMIN — LINAGLIPTIN 5 MG: 5 TABLET, FILM COATED ORAL at 06:44

## 2017-02-17 RX ADMIN — LISINOPRIL 2.5 MG: 5 TABLET ORAL at 09:30

## 2017-02-17 RX ADMIN — ASPIRIN 81 MG CHEWABLE TABLET 81 MG: 81 TABLET CHEWABLE at 09:30

## 2017-02-17 RX ADMIN — ACETAMINOPHEN 650 MG: 325 TABLET ORAL at 11:04

## 2017-02-17 RX ADMIN — HEPARIN SODIUM 5000 UNITS: 5000 INJECTION, SOLUTION INTRAVENOUS; SUBCUTANEOUS at 17:01

## 2017-02-17 RX ADMIN — HEPARIN SODIUM 5000 UNITS: 5000 INJECTION, SOLUTION INTRAVENOUS; SUBCUTANEOUS at 00:55

## 2017-02-17 NOTE — ROUTINE PROCESS
Bedside and Verbal shift change report given to Kelin Black RN (oncoming nurse) by Rob Marr (offgoing nurse). Report included the following information SBAR, Kardex, ED Summary, Intake/Output, MAR and Recent Results.

## 2017-02-17 NOTE — ROUTINE PROCESS
Patient said that his IV is bothering him. Nurse offered to replace his IV, and remove the old one. Patient refuses to have another IV palced. MD notified. MD stated that if MRI comes back with abnormal results than patient will need new IV, but otherwise patient does not need an IV.

## 2017-02-17 NOTE — PROGRESS NOTES
Occupational therapy note:  Orders received, chart reviewed. Attempted to see patient for OT evaluation. Patient received supine in bed, HOB elevated, and bilateral LEs elevated on pillows. Patient very talkative during assessment, and requires near constant redirection. Patient declining sitting EOB due to pain in RLE reporting if he does it just amplifies pain and he must lie very still to allow pain to reduce. Upon attempt to encourage activity, Podiatry in to see patient. Will follow up with patient as able for OT evaluation. Ericka Delgado MS OTR/L

## 2017-02-17 NOTE — PROGRESS NOTES
Attempted to see patient for follow up therapy session, but he is still in too much pain to attempt any ambulation or even standing. He states he would like to wait until after MRI until attempting any ambulation with a walker. R LE is still erythematous and edematous. We will follow up after MRI.     Kerline Bee, PT

## 2017-02-17 NOTE — PROGRESS NOTES
[facility] [place] [facility]  [address] [facility], [place], [address] [place] [address]  [phone]      Medical Progress Note      NAME: Melanie Parsons. :  1940  MRM:  [id number]    Date/Time: 2017  11:49 AM       Assessment and Plan:     Cellulitis - POA, RLE, maybe. Could just be general redness from skin damage from edema. NO SIRS criteria on admission. Got empiric vancomycin. Negative MRSA screen. Foot xray for foreign body. However, some dorsal fluctuance and tenderness, more evident now that general leg edema resolved. Maybe abscess or FB. Check MRI and consult podiatry. CKD (chronic kidney disease) stage 4 / hyponatremia - Cr a bit lower than baseline, due to non-compliance with fluid restriction. Consulted renal.  Avoid nephrotoxins. DM type 2 causing renal, vascular and neurological disease / Hypoglycemia - Acutely low BG overnight. Diabetic diet and counseling. SSI per protocol. Hold home glipizide. A1c was 7;.4, but may be wrong due to anemia. Consulted endocrinology. Started low dose ACE    CAD (coronary artery disease) / HTN (hypertension) / Aortic stenosis / Pulmonary hypertesion / LE edema - No acute signs of CHF. AS and Pulm HTN on ECHO. Start BB, ACE. Could start diuretics as they recommend. STOP norvasc, due to LE edema        PABLO (obstructive sleep apnea) / Obesity - Consult pulm to arrange re-testing and CPAP. Anemia - Due to chronic kidney disease. Pure hypercholesterolemia - LDL 86 on lipid panel. Not on statin    Secondary hyperparathyroidism - Renal to manage, due to CKD    Dementia / Anxiety - Noted on interview. Not on meds. As outpatient needs neuropsych consult. Palliative care consult now. Subjective:     Chief Complaint:  Painful R foot persists, still perseverating, tangential    ROS:  (bold if positive, if negative)      Tolerating minimal PT Tolerating Diet        Objective:     Last 24hrs VS reviewed since prior progress note. Most recent are:    Visit Vitals    /80 (BP 1 Location: Left arm, BP Patient Position: At rest)    Pulse 96    Temp 97.7 °F (36.5 °C)    Resp 18    Ht 5' 11\" (1.803 m)    Wt 117.9 kg (260 lb)    SpO2 91%    BMI 36.26 kg/m2     SpO2 Readings from Last 6 Encounters:   02/17/17 91%   12/11/15 97%   01/22/14 100%   05/05/11 98%   04/15/11 98%            Intake/Output Summary (Last 24 hours) at 02/17/17 0931  Last data filed at 02/17/17 4504   Gross per 24 hour   Intake              750 ml   Output              850 ml   Net             -100 ml        Physical Exam:    Gen:  Obese, in no acute distress  HEENT:  Pink conjunctivae, PERRL, hearing intact to voice, moist mucous membranes  Neck:  Supple, without masses, thyroid non-tender  Resp:  No accessory muscle use, distant breath sounds without wheezes rales or rhonchi  Card:  No murmurs, tachycardic S1, S2 without thrills, bruits, R>L 1+ peripheral edema  Abd:  Soft, non-tender, non-distended, normoactive bowel sounds are present, no mass  Lymph:  No cervical or inguinal adenopathy  Musc:  No cyanosis or clubbing  Skin:  No rashes or ulcers.  Dorsum of R foot with mild fluctuance and point tenderness, skin turgor is good  Neuro:  Cranial nerves are grossly intact, general motor weakness, follows commands vaguely  Psych:  Poor insight, oriented to person, place, pressured, anxious, tangential    Telemetry reviewed:   normal sinus rhythm  __________________________________________________________________  Medications Reviewed: (see below)  Medications:     Current Facility-Administered Medications   Medication Dose Route Frequency    aspirin chewable tablet 81 mg  81 mg Oral DAILY    carvedilol (COREG) tablet 12.5 mg  12.5 mg Oral BID WITH MEALS    lisinopril (PRINIVIL, ZESTRIL) tablet 2.5 mg  2.5 mg Oral DAILY    linagliptin (TRADJENTA) tablet 5 mg  5 mg Oral ACB    Vancomycin trough at 2200 on 2/16/17   Other Rx Dosing/Monitoring    sodium chloride (NS) flush 5-10 mL  5-10 mL IntraVENous Q8H    sodium chloride (NS) flush 5-10 mL  5-10 mL IntraVENous PRN    acetaminophen (TYLENOL) tablet 650 mg  650 mg Oral Q4H PRN    oxyCODONE-acetaminophen (PERCOCET) 5-325 mg per tablet 1 Tab  1 Tab Oral Q4H PRN    HYDROmorphone (PF) (DILAUDID) injection 0.5 mg  0.5 mg IntraVENous Q4H PRN    zolpidem (AMBIEN) tablet 5 mg  5 mg Oral QHS PRN    insulin lispro (HUMALOG) injection   SubCUTAneous AC&HS    glucose chewable tablet 16 g  4 Tab Oral PRN    dextrose (D50W) injection syrg 12.5-25 g  12.5-25 g IntraVENous PRN    glucagon (GLUCAGEN) injection 1 mg  1 mg IntraMUSCular PRN    heparin (porcine) injection 5,000 Units  5,000 Units SubCUTAneous Q8H    vancomycin (VANCOCIN) 1,750 mg in 0.9% sodium chloride 500 mL IVPB  1,750 mg IntraVENous Q24H    prochlorperazine (COMPAZINE) injection 5 mg  5 mg IntraVENous Q6H PRN        Lab Data Reviewed: (see below)  Lab Review:     Recent Labs      02/16/17   0656  02/15/17   0510  02/14/17 2127   WBC  7.1  7.5  9.9   HGB  10.8*  10.4*  11.5*   HCT  31.6*  30.7*  34.3*   PLT  309  309  354     Recent Labs      02/17/17   0736  02/16/17   0656  02/15/17   0510  02/14/17 2127   NA   --   136  132*  132*   K   --   4.2  3.9  4.4   CL   --   100  96*  94*   CO2   --   25  28  26   GLU   --   80  67  36*   BUN   --   29*  35*  35*   CREA  2.11*  2.10*  2.33*  2.39*   CA   --   8.2*  8.2*  8.6  9.0   MG   --   2.0  2.1   --    PHOS   --   3.6  3.5   --    ALB   --   2.7*   --   3.1*   TBILI   --    --    --   1.1*   SGOT   --    --    --   36   ALT   --    --    --   38     Lab Results   Component Value Date/Time    Glucose (POC) 89 02/17/2017 08:07 AM    Glucose (POC) 77 02/17/2017 07:55 AM    Glucose (POC) 79 02/17/2017 07:28 AM    Glucose (POC) 123 02/16/2017 10:01 PM    Glucose (POC) 115 02/16/2017 04:57 PM    Glucose (POC) 218 05/01/2011 01:16 PM     No results for input(s): PH, PCO2, PO2, HCO3, FIO2 in the last 72 hours. No results for input(s): INR in the last 72 hours. No lab exists for component: Baldo Hopkins  All Micro Results     Procedure Component Value Units Date/Time    CULTURE, MRSA [[id number]] Collected:  02/16/17 2327    Order Status:  Completed Specimen:  Nares Updated:  02/17/17 0845     Special Requests: NO SPECIAL REQUESTS        Culture result: MRSA NOT PRESENT  AT 21 HOURS             I have reviewed notes of prior 24hr.     Other pertinent lab: none    Total time spent with patient: [address] [place] [address] discussed with: Patient, Family, Care Manager, Nursing Staff, Consultant/Specialist and >50% of time spent in counseling and coordination of care    Discussed:  Care Plan and D/C Planning    Prophylaxis:  Hep SQ and H2B/PPI    Disposition:  SNF/LTC           ___________________________________________________    Attending Physician: Monisha Bender MD

## 2017-02-17 NOTE — PROGRESS NOTES
Emi Guaman MD, [address] [facility]  [address]  Office [phone]      IMPRESSION and RECOMMENDATIONS        1. AS: Mild to moderate on echo. Not severe on exam. No active cardiac issues. Will need chronic f/u. Had seen Dr. Tawny Kilpatrick for this in past. He can f/u with him or me.     2. CAD: H/O stent 2008. Resumed ASA. LDL acceptable.     3. Edema: Likely in part due to venous insuff as well as moderate pulm HTN noted on echo. Norvasc started for BP Tx: watch for worsening edema.     I have discussed this plan with the patient and family member. He appears to understand this plan and wishes to proceed ahead. Will see prn over weekend. Subjective:       Pt without complaints save has to go to bathroom. Objective:   Patient Vitals for the past 16 hrs:   BP Temp Pulse Resp SpO2   02/17/17 0608 167/80 97.7 °F (36.5 °C) 96 18 91 %   02/16/17 2202 148/81 98.3 °F (36.8 °C) (!) 101 18 96 %   02/16/17 1832 111/58 98.5 °F (36.9 °C) 92 18 95 %       HEENT Exam:     WNL         Lung Exam:     The patient is not dyspneic. Breath sounds are heard equally in all lung fields. There are no wheezes, rales, rhonchi, or rubs heard on auscultation. Heart Exam:     The rhythm is regular. The PMI is in the 5th intercostal space of the MCL. Apical impulse is normal. S1 is regular. S2 is physiologic. There is no S3, S4 gallop, click, or rub. 3/6 early peaking SM @ RUSB with diffuse radiation. Abdomen Exam:     Benign. Extremities Exam:     No cyanosis, clubbing. Distal pulses intact. Mild to mod LE edema.            Lab Results   Component Value Date/Time    Glucose 80 02/16/2017 06:56 AM    Sodium 136 02/16/2017 06:56 AM    Potassium 4.2 02/16/2017 06:56 AM    Chloride 100 02/16/2017 06:56 AM    CO2 25 02/16/2017 06:56 AM    BUN 29 02/16/2017 06:56 AM    Creatinine 2.11 02/17/2017 07:36 AM    Calcium 8.2 02/16/2017 06:56 AM    Calcium 8.2 02/16/2017 06:56 AM     Recent Labs      02/16/17   0656  02/15/17   0510   WBC  7.1  7.5   HGB  10.8*  10.4*   HCT  31.6*  30.7*   PLT  309  309     Recent Labs      02/16/17   0656  02/14/17   2127   SGOT   --   36   ALT   --   38   AP   --   118*   TBILI   --   1.1*   TP   --   7.7   ALB  2.7*  3.1*   GLOB   --   4.6*     No results for input(s): INR, PTP, APTT in the last 72 hours. No lab exists for component: INREXT   No results for input(s): CPK, CKMB, TNIPOC in the last 72 hours. No lab exists for component: TROPONINI, ITNL  No results for input(s): TROIQ in the last 72 hours. Lab Results   Component Value Date/Time    Cholesterol, total 162 02/16/2017 06:56 AM    HDL Cholesterol 47 02/16/2017 06:56 AM    LDL, calculated 96.2 02/16/2017 06:56 AM    Triglyceride 94 02/16/2017 06:56 AM    CHOL/HDL Ratio 3.4 02/16/2017 06:56 AM         Echo:  SUMMARY:  Left ventricle: Systolic function was mildly reduced. Ejection fraction  was estimated to be 50 %. No obvious wall motion abnormalities identified  in the views obtained. Wall thickness was mildly increased. There was mild  concentric hypertrophy. Left atrium: The atrium was mildly dilated. Right atrium: The atrium was mildly dilated. Aortic valve: The valve was trileaflet. Leaflets exhibited moderately  increased thickness, moderate calcification, moderately reduced cuspal  separation, reduced mobility, and sclerosis. There was mild to moderate  stenosis. There was mild regurgitation. Valve peak gradient was 44 mmHg. Valve mean gradient was 27 mmHg. Tricuspid valve: There was mild regurgitation. Pulmonary artery systolic  pressure: 61 mmHg. There was moderate pulmonary hypertension. Pulmonic valve: There was mild regurgitation.

## 2017-02-17 NOTE — DISCHARGE INSTRUCTIONS
HOSPITALIST DISCHARGE INSTRUCTIONS  NAME: Tamika Berry :  1940   MRN:  [id number]     Date/Time:  2017 10:23 AM    ADMIT DATE: 2017     DISCHARGE DATE: 2017     ADMITTING DIAGNOSIS:  Cellulitis; RLE swelling (negative doppler for DVT. Negative MRI for abscess)     DISCHARGE DIAGNOSIS:  As above     MEDICATIONS:     · It is important that you take the medication exactly as they are prescribed. · Keep your medication in the bottles provided by the pharmacist and keep a list of the medication names, dosages, and times to be taken in your wallet. · Do not take other medications without consulting your doctor. Pain Management: per above medications    What to do at Home    Recommended diet:  Cardiac Diet and Diabetic Diet    Recommended activity: Activity as tolerated. When not ambulating please keep RLE elevated     If you experience any of the following symptoms then please call your primary care physician or return to the emergency room if you cannot get hold of your doctor:  Fever, chills, nausea, vomiting, diarrhea, change in mentation, falling, bleeding, shortness of breath, chest pain    Follow Up:  Dr. José Lea MD  you are to call and set up an appointment to see them in 1-2 weeks as needed     Information obtained by :  I understand that if any problems occur once I am at home I am to contact my physician. I understand and acknowledge receipt of the instructions indicated above.                                                                                                                                            Physician's or R.N.'s Signature                                                                  Date/Time                                                                                                                                              Patient or Representative Signature                                                          Date/Time

## 2017-02-17 NOTE — PROGRESS NOTES
[facility] Dosing Services: Antimicrobial Stewardship Daily Doc    Consult for dosing of vancomycin by Dr. Andrew Campos  Indication: RLE cellulitis  Day of Therapy: 4    Vancomycin therapy:  Current maintenance dose: 1750 mg every 24 hours. Last trough level 21.2 mcg/ml on 2/16/17 (supra-therapeutic)    PLAN  Change Vancomycin to 1500 mg IV Q 24 hrs  Dose calculated to approximate a therapeutic trough of 10-15 mcg/mL. Re-check level before 3rd maintenance dose of new regimen      Other Antimicrobial   (not dosed by pharmacist) None   Cultures 2/16 MRSA: not present (FINAL)   Serum Creatinine Lab Results   Component Value Date/Time    Creatinine 2.11 02/17/2017 07:36 AM    Creatinine (POC) 4.1 05/01/2011 01:16 PM         Creatinine Clearance Estimated Creatinine Clearance: 40.1 mL/min (based on Cr of 2.11).      Temp Temp: 97.6 °F (36.4 °C)       WBC Lab Results   Component Value Date/Time    WBC 7.1 02/16/2017 06:56 AM        H/H Lab Results   Component Value Date/Time    HGB 10.8 02/16/2017 06:56 AM        Platelets    Lab Results   Component Value Date/Time    PLATELET 963 68/38/4716 06:56 AM            Pharmacist HARLEY SchmitzD Contact information: [phone]

## 2017-02-17 NOTE — INTERDISCIPLINARY ROUNDS
Interdisciplinary team rounds were held 2/17/2017 with the following team members:Care Management, Nursing, Physical Therapy, Clinical Coordinator and Unit Nurse Manager. .    Plan of care discussed. See clinical pathway and/or care plan for interventions and desired outcomes.     Anticipated discharge: pending

## 2017-02-17 NOTE — CONSULTS
[facility]                                       Martha Bagley DPM - Zen[place]ia Halsted K[address] Sil Sprain, [address] [place], [address], [place], [address] [place] [address]                                            P: [phone]  F: [phone]                                                 Podiatric Surgery Consultation  Assessment/Plan:  - Pt evaluated and tx.  - RT foot cellulitis - RT foot exquisitely TTP/edematous, no arya ulceration or other skin issues, no obvious point of entry for a FB, no findings on XR other than edema, WBC WNL; MRI pending, if no evidence of abscess pt can be managed medically with abx per primary team.  Will f/u on MRI results.  - Nails x 5 sharply debrided, pt would not allow us to debride the RT foot toenails. - Thank you for this consultation. Please do not hesitate to call with any questions. Will monitor. Subjective:  Pt complains of swelling to RT foot, over past few days. HPI: Pt apparently has some level of cognitive dysfunction, and was unable to provide much history. Apparently the foot swelled up and he [facility] the ED at [facility], and has been on empiric IV vancomycin. We are consulted to R/O FB and/or abscess.      History:  Cellulitis  Allergies   Allergen Reactions    Albuterol Other (comments)     patient reports that albuterol gives him phlegm and makes it harder to breathe    Norvasc [Amlodipine] Shortness of Breath    Pcn [Penicillins] Shortness of Breath     Just started course of PCN and hydrocodone on Monday     Family History   Problem Relation Age of Onset    Diabetes Father       Past Medical History   Diagnosis Date    Aortic stenosis     CAD (coronary artery disease)     CKD (chronic kidney disease) stage 4, GFR 15-29 ml/min ([address] 2/14/2017    Diabetes ([address]     Hypertension     Pulmonary hypertension ([address]      Past Surgical History   Procedure Laterality Date    Pr cardiac surg procedure unlist cardiac cath without stents x3     Social History   Substance Use Topics    Smoking status: Never Smoker    Smokeless tobacco: Never Used    Alcohol use 0.0 oz/week     0 Standard drinks or equivalent per week      Comment: Rare       History   Alcohol Use    0.0 oz/week    0 Standard drinks or equivalent per week     Comment: Rare     History   Drug Use No      History   Smoking Status    Never Smoker   Smokeless Tobacco    Never Used     Current Facility-Administered Medications   Medication Dose Route Frequency    aspirin chewable tablet 81 mg  81 mg Oral DAILY    carvedilol (COREG) tablet 12.5 mg  12.5 mg Oral BID WITH MEALS    lisinopril (PRINIVIL, ZESTRIL) tablet 2.5 mg  2.5 mg Oral DAILY    linagliptin (TRADJENTA) tablet 5 mg  5 mg Oral ACB    Vancomycin trough at 2200 on 2/16/17   Other Rx Dosing/Monitoring    sodium chloride (NS) flush 5-10 mL  5-10 mL IntraVENous Q8H    sodium chloride (NS) flush 5-10 mL  5-10 mL IntraVENous PRN    acetaminophen (TYLENOL) tablet 650 mg  650 mg Oral Q4H PRN    oxyCODONE-acetaminophen (PERCOCET) 5-325 mg per tablet 1 Tab  1 Tab Oral Q4H PRN    HYDROmorphone (PF) (DILAUDID) injection 0.5 mg  0.5 mg IntraVENous Q4H PRN    zolpidem (AMBIEN) tablet 5 mg  5 mg Oral QHS PRN    insulin lispro (HUMALOG) injection   SubCUTAneous AC&HS    glucose chewable tablet 16 g  4 Tab Oral PRN    dextrose (D50W) injection syrg 12.5-25 g  12.5-25 g IntraVENous PRN    glucagon (GLUCAGEN) injection 1 mg  1 mg IntraMUSCular PRN    heparin (porcine) injection 5,000 Units  5,000 Units SubCUTAneous Q8H    vancomycin (VANCOCIN) 1,750 mg in 0.9% sodium chloride 500 mL IVPB  1,750 mg IntraVENous Q24H    prochlorperazine (COMPAZINE) injection 5 mg  5 mg IntraVENous Q6H PRN        Objective:  Vitals: Patient Vitals for the past 12 hrs:   BP Temp Pulse Resp SpO2   02/17/17 1004 144/74 97.6 °F (36.4 °C) 93 18 94 %   02/17/17 0608 167/80 97.7 °F (36.5 °C) 96 18 91 % Vascular:  B/L LE  DP 0/4; PT 0/4  capillary fill time brisk, pitting edema is present, RLE > LLE, skin temperature is cool on LT and warm on RT, varicosities are present. Dermatological:  Nails are thickened, elongated, discolored, painful to palpation, 3 mm thick, with subungual debris. Skin is dry and scaly, exhibits hemosiderin deposition. There is no maceration of the interspaces of the feet b/l. Over dorsal foot, no arya fluctulence or SQ emphysema, but exquisitely TTP. Neurological:  DTR are present, protective sensation per 5.07 [place] monofilament is present, patient is AAO, mood is confused. Epicritic sensation is intact. Orthopedic:  B/L LE are symmetric, ROM of ankle, STJ, 1st MTPJ is limited, MMT 5 out of 5 for B/L LE. No pedal amputations. Constitutional: Pt is a well developed elderly AAM.    Lymphatics: negative tenderness to palpation of neck/axillary/inguinal nodes.     Imaging:  XR RT foot: no evidence FB or osteomyelitis, edema  MRI: pending    Labs:  Recent Labs      02/17/17   0736  02/16/17   0656   HGB   --   10.8*   HCT   --   31.6*   NA   --   136   K   --   4.2   CL   --   100   CO2   --   25   BUN   --   29*   CREA  2.11*  2.10*   GLU   --   80   WBC   --   7.1

## 2017-02-17 NOTE — DISCHARGE SUMMARY
Physician Discharge Summary     Patient ID:  Khai Barrios  [id number]  78 y.o.  1940    Admit date: 2/14/2017    Discharge date and time: 2/17/2017    Admission Diagnoses: Cellulitis    Discharge Diagnoses:    Principal Diagnosis   Cellulitis                                             Other Diagnoses    HTN (hypertension) (4/15/2011)    DM type 2 causing renal disease ([facility] (4/15/2011)    Obesity (4/15/2011)    PABLO (obstructive sleep apnea) (4/15/2011)    CAD (coronary artery disease) (4/15/2011)    Pure hypercholesterolemia (4/15/2011)    CKD (chronic kidney disease) stage 4, GFR 15-29 ml/min ([facility]) (2/14/2017)    Dementia (2/15/2017)    Anxiety (2/15/2017)    Aortic stenosis ()    Pulmonary hypertension ([facility] ()    Hospital Course:   Cellulitis - POA, RLE, maybe. Could just be general redness from skin damage from edema. NO SIRS criteria on admission. Got empiric vancomycin. Negative MRSA screen. Foot xray for foreign body. However, some dorsal fluctuance and tenderness, more evident now that general leg edema resolved. Maybe abscess or FB. Check MRI and consult podiatry.     CKD (chronic kidney disease) stage 4 / hyponatremia - Cr a bit lower than baseline, due to non-compliance with fluid restriction. Consulted renal. Avoid nephrotoxins.     DM type 2 causing renal, vascular and neurological disease / Hypoglycemia - Acutely low BG overnight. Diabetic diet and counseling. SSI per protocol. Hold home glipizide. A1c was 7;.4, but may be wrong due to anemia. Consulted endocrinology. Started low dose ACE     CAD (coronary artery disease) / HTN (hypertension) / Aortic stenosis / Pulmonary hypertesion / LE edema - No acute signs of CHF. AS and Pulm HTN on ECHO. Start BB, ACE. Could start diuretics as they recommend.  STOP norvasc, due to LE edema      PABLO (obstructive sleep apnea) / Obesity - Consult pulm to arrange re-testing and CPAP.     Anemia - Due to chronic kidney disease.     Pure hypercholesterolemia - LDL 86 on lipid panel. Not on statin     Secondary hyperparathyroidism - Renal to manage, due to CKD     Dementia / Anxiety - Noted on interview. Not on meds. As outpatient needs neuropsych consult. Palliative care consult now.     PCP: Leelee Grimes MD    Consults: Cardiology and podiatry    Significant Diagnostic Studies: See Hospital Course    Discharged home in improved condition. Discharge Exam:   /80 (BP 1 Location: Left arm, BP Patient Position: At rest)    Pulse 96    Temp 97.7 °F (36.5 °C)    Resp 18    Ht 5' 11\" (1.803 m)    Wt 117.9 kg (260 lb)    SpO2 91%    BMI 36.26 kg/m2      Gen: Obese, in no acute distress  HEENT: Pink conjunctivae, PERRL, hearing intact to voice, moist mucous membranes  Neck: Supple, without masses, thyroid non-tender  Resp: No accessory muscle use, distant breath sounds without wheezes rales or rhonchi  Card: No murmurs, tachycardic S1, S2 without thrills, bruits, R>L 1+ peripheral edema  Abd: Soft, non-tender, non-distended, normoactive bowel sounds are present, no mass  Lymph: No cervical or inguinal adenopathy  Musc: No cyanosis or clubbing  Skin: No rashes or ulcers. Dorsum of R foot with mild fluctuance and point tenderness, skin turgor is good  Neuro: Cranial nerves are grossly intact, general motor weakness, follows commands vaguely  Psych: Poor insight, oriented to person, place, pressured, anxious, tangential    Patient Instructions:   Current Discharge Medication List      START taking these medications    Details   carvedilol (COREG) 12.5 mg tablet Take 1 Tab by mouth two (2) times daily (with meals) for 90 days. Qty: 60 Tab, Refills: 2      cephALEXin (KEFLEX) 500 mg capsule Take 1 Cap by mouth every six (6) hours for 10 days. Qty: 40 Cap, Refills: 0         CONTINUE these medications which have NOT CHANGED    Details   glipiZIDE SR (GLUCOTROL XL) 2.5 mg CR tablet Take 2.5 mg by mouth daily. MAGNESIUM GLYCINATE PO Take 266 mg by mouth daily. COQ10, UBIQUINOL, PO Take 60 mg by mouth daily. cholecalciferol (VITAMIN D3) 1,000 unit tablet Take 5,000 Units by mouth daily. cyanocobalamin 1,000 mcg tablet Take 1,000 mcg by mouth daily. FOLIC ACID PO Take 901 mcg by mouth. aspirin delayed-release 81 mg tablet Take 81 mg by mouth daily. Activity: Activity as tolerated and keep legs elevated when at rest  Diet: Cardiac Diet, Diabetic Diet and Low fat, Low cholesterol  Wound Care: None needed    Follow-up with your PCP in a few weeks.   Follow-up tests/labs - none    Signed:  Delisa Bhardwaj MD  2/17/2017  3:45 PM

## 2017-02-17 NOTE — PROGRESS NOTES
02/17/17  5:20 PM    MSW spent much time with the patient and daughter. Daughter in from [place]. Patient focused on his IV. Plan was for patient to be discharged home but now decided that he wants to go to [facility]. Referral made to [facility] and Radha due to patient unsure at the time. [facility] has accepted him for Sat. Admission and they would like him there at 11:00am. Informed daughter who is agreeable to transport him there. Patient scheduled for MRI tonight. MSW informed Radha  of change.     RADHA Funes

## 2017-02-17 NOTE — PROGRESS NOTES
2/16/2017 7:15 PM Went into this pt's room this AM to speak with he and his family about a hospital bed. SW explained the process and noted that Md would need to be willing to write for the DME and also that Md would need to know if this DME was recommended by rehab services. DUGLAS spoke with PT Jin Bill, who did not recommend a hospital bed, but did order a bariatric rolling walker and a bariatric bed side commode with a drop arm. Choice letter was singed by pt's son this AM. Pt's family choose [employer]. Referral sent to [employer] via [place] and direct fax. Plan was to have this DME delivered to the pt's son's home. Unable to find a phone number for the pt's son. Care management to continue to follow.    ESDRAS TorresW

## 2017-02-18 VITALS
SYSTOLIC BLOOD PRESSURE: 161 MMHG | DIASTOLIC BLOOD PRESSURE: 72 MMHG | TEMPERATURE: 97.8 F | HEART RATE: 88 BPM | BODY MASS INDEX: 36.4 KG/M2 | HEIGHT: 71 IN | OXYGEN SATURATION: 98 % | RESPIRATION RATE: 18 BRPM | WEIGHT: 260 LBS

## 2017-02-18 LAB
ANION GAP BLD CALC-SCNC: 9 MMOL/L (ref 5–15)
BUN SERPL-MCNC: 32 MG/DL (ref 6–20)
BUN/CREAT SERPL: 16 (ref 12–20)
CALCIUM SERPL-MCNC: 8.6 MG/DL (ref 8.5–10.1)
CHLORIDE SERPL-SCNC: 99 MMOL/L (ref 97–108)
CO2 SERPL-SCNC: 26 MMOL/L (ref 21–32)
CREAT SERPL-MCNC: 2.02 MG/DL (ref 0.7–1.3)
ERYTHROCYTE [DISTWIDTH] IN BLOOD BY AUTOMATED COUNT: 14.2 % (ref 11.5–14.5)
GLUCOSE BLD STRIP.AUTO-MCNC: 152 MG/DL (ref 65–100)
GLUCOSE BLD STRIP.AUTO-MCNC: 89 MG/DL (ref 65–100)
GLUCOSE BLD STRIP.AUTO-MCNC: 92 MG/DL (ref 65–100)
GLUCOSE SERPL-MCNC: 94 MG/DL (ref 65–100)
HCT VFR BLD AUTO: 33.5 % (ref 36.6–50.3)
HGB BLD-MCNC: 11.2 G/DL (ref 12.1–17)
MAGNESIUM SERPL-MCNC: 1.9 MG/DL (ref 1.6–2.4)
MCH RBC QN AUTO: 29.3 PG (ref 26–34)
MCHC RBC AUTO-ENTMCNC: 33.4 G/DL (ref 30–36.5)
MCV RBC AUTO: 87.7 FL (ref 80–99)
PLATELET # BLD AUTO: 338 K/UL (ref 150–400)
POTASSIUM SERPL-SCNC: 4.1 MMOL/L (ref 3.5–5.1)
RBC # BLD AUTO: 3.82 M/UL (ref 4.1–5.7)
SERVICE CMNT-IMP: ABNORMAL
SERVICE CMNT-IMP: NORMAL
SERVICE CMNT-IMP: NORMAL
SODIUM SERPL-SCNC: 134 MMOL/L (ref 136–145)
WBC # BLD AUTO: 6.3 K/UL (ref 4.1–11.1)

## 2017-02-18 PROCEDURE — 36415 COLL VENOUS BLD VENIPUNCTURE: CPT | Performed by: INTERNAL MEDICINE

## 2017-02-18 PROCEDURE — 74011250637 HC RX REV CODE- 250/637: Performed by: INTERNAL MEDICINE

## 2017-02-18 PROCEDURE — 83735 ASSAY OF MAGNESIUM: CPT | Performed by: INTERNAL MEDICINE

## 2017-02-18 PROCEDURE — 74011250637 HC RX REV CODE- 250/637: Performed by: PHYSICIAN ASSISTANT

## 2017-02-18 PROCEDURE — 74011250636 HC RX REV CODE- 250/636: Performed by: INTERNAL MEDICINE

## 2017-02-18 PROCEDURE — 80048 BASIC METABOLIC PNL TOTAL CA: CPT | Performed by: INTERNAL MEDICINE

## 2017-02-18 PROCEDURE — 85027 COMPLETE CBC AUTOMATED: CPT | Performed by: INTERNAL MEDICINE

## 2017-02-18 PROCEDURE — 82962 GLUCOSE BLOOD TEST: CPT

## 2017-02-18 PROCEDURE — 74011250637 HC RX REV CODE- 250/637: Performed by: SPECIALIST

## 2017-02-18 RX ORDER — LISINOPRIL 2.5 MG/1
2.5 TABLET ORAL DAILY
Qty: 30 TAB | Refills: 0 | Status: SHIPPED | OUTPATIENT
Start: 2017-02-18 | End: 2017-08-31

## 2017-02-18 RX ORDER — OXYCODONE AND ACETAMINOPHEN 5; 325 MG/1; MG/1
1 TABLET ORAL
Qty: 15 TAB | Refills: 0 | Status: SHIPPED | OUTPATIENT
Start: 2017-02-18 | End: 2017-08-31

## 2017-02-18 RX ORDER — CEPHALEXIN 500 MG/1
500 CAPSULE ORAL 4 TIMES DAILY
Qty: 40 CAP | Refills: 0 | Status: SHIPPED | OUTPATIENT
Start: 2017-02-18 | End: 2017-02-28

## 2017-02-18 RX ADMIN — ASPIRIN 81 MG CHEWABLE TABLET 81 MG: 81 TABLET CHEWABLE at 09:59

## 2017-02-18 RX ADMIN — LISINOPRIL 2.5 MG: 5 TABLET ORAL at 09:59

## 2017-02-18 RX ADMIN — LINAGLIPTIN 5 MG: 5 TABLET, FILM COATED ORAL at 09:59

## 2017-02-18 RX ADMIN — CEPHALEXIN 500 MG: 250 CAPSULE ORAL at 12:23

## 2017-02-18 RX ADMIN — CEPHALEXIN 500 MG: 250 CAPSULE ORAL at 06:33

## 2017-02-18 RX ADMIN — HEPARIN SODIUM 5000 UNITS: 5000 INJECTION, SOLUTION INTRAVENOUS; SUBCUTANEOUS at 00:31

## 2017-02-18 RX ADMIN — CARVEDILOL 12.5 MG: 12.5 TABLET, FILM COATED ORAL at 09:59

## 2017-02-18 RX ADMIN — CEPHALEXIN 500 MG: 250 CAPSULE ORAL at 00:31

## 2017-02-18 NOTE — PROGRESS NOTES
Attempted several times to draw morning labs and all times patient has been rude and refused. Had one other nurse attempt to draw labs and patient kept saying \"no, I'm not having that, I'm not being stuck all morning- everyone is having trouble sticking me. \"  Explained importance of the lab work and MD wanting to see results this AM and patient states he does not care because he will walk out of here without being discharged. Patient then went on to say that he would \"knock this nurse out. \"  Second nurse still attempting morning labs. Will continue to monitor. 56- Second RN able to collect morning labs. Will continue to monitor. 0730- Bedside and Verbal shift change report given to Ric Callahan RN (oncoming nurse) by Essie Balderrama RN (offgoing nurse). Report included the following information SBAR, Kardex, Procedure Summary, Intake/Output, MAR, Accordion, Recent Results and Med Rec Status.

## 2017-02-18 NOTE — ROUTINE PROCESS
Bedside and Verbal shift change report given to [address] [place] [address] (oncoming nurse) by Will RN (offgoing nurse). Report included the following information SBAR, Kardex, Intake/Output and MAR.

## 2017-02-18 NOTE — PROGRESS NOTES
2/18/2017 3:24 PM Pt's son did not transport. SW made a call to the Md and also spoke with the pt's dtr. SW offered wheel chair transport. Pt's dtr, Quirino Henry states that she will transport the pt herself and that she plans to arrive at the hospital around 3:30PM. Pt's RN aware. Brody Myers     2/18/2017 11:35 AM Plan is for pt's family to transport him to rehab at [facility]. Pt's scripts, AVS, MARs, xrays, labs and discharge summary have been faxed to [facility] at [phone] (fax). SW spoke with pt's dtr, Quirino Henry this AM. She reports that the pt's DME arrived. SW noted that usually the hospital does not order DME and send a patient to rehab, but that this pt's DME process started prior to the rehab process and that's why it was still delivered to the hospital.   Brody Myers     2/18/2017 10:19 AM Hand off received this AM. DUGLAS spoke with pt's Md this AM. Plan is for discharge today. Pt is going to [facility] via family transport. Nursing please call report: [phone] and ask for wing 1.    BALJEET Myers

## 2017-02-18 NOTE — DISCHARGE SUMMARY
Physician Discharge Summary     Patient ID:  Hermelinda Tarango  [id number]  37 y.o.  1940    Admit date: 2/14/2017    Discharge date and time: 2/18/2017    Admission Diagnoses: Cellulitis    Discharge Diagnoses/Hospital Course   Cellulitis - POA, RLE. NO SIRS criteria on admission. MRI with subcutaneous edema c/w cellulitis but no evidence of abscess or foreign body. On vancomycin while in house. Transitioned to [facility] for discharge as no evidence of MRSA. S/p podiatry eval.       CKD (chronic kidney disease) stage 4 / hyponatremia - at baseline. Renally adjust meds.      DM type 2 causing renal, vascular and neurological disease / Hypoglycemia - A1c 7.4. S/p endocrinology eval. Recommended Trajenta. May be an issue in the future if insurance authorization not approved      CAD (coronary artery disease) / HTN (hypertension) / Aortic stenosis / Pulmonary hypertesion / LE edema - No acute signs of CHF. AS and Pulm HTN on ECHO. Evaluated by cardiology. Off norvasc due to LE swelling.       PABLO (obstructive sleep apnea) / Obesity - will need repeat outpatient sleep study       Anemia - due to CKD      Pure hypercholesterolemia - LDL 86 on lipid panel. Not on statin      Secondary hyperparathyroidism - managed by renal       Dementia / Anxiety - Noted on interview. Not on meds.  Would benefit from outpatient neuropsych consult      PCP: Shiva Stearns MD     Consults: Cardiology, Nephrology and Podiatry     Discharge Exam:  Visit Vitals    /72 (BP 1 Location: Left arm, BP Patient Position: At rest)    Pulse 88    Temp 97.8 °F (36.6 °C)    Resp 18    Ht 5' 11\" (1.803 m)    Wt 117.9 kg (260 lb)    SpO2 98%    BMI 36.26 kg/m2     Gen: Obese, in no acute distress  HEENT: Pink conjunctivae, PERRL, hearing intact to voice, moist mucous membranes  Neck: Supple, without masses, thyroid non-tender  Resp: No accessory muscle use, distant breath sounds without wheezes rales or rhonchi  Card: No murmurs, S1, S2 without thrills, bruits, R>L 1+ peripheral edema  Abd: Soft, non-tender, non-distended, normoactive bowel sounds are present, no mass  Lymph: No cervical or inguinal adenopathy  Musc: No cyanosis or clubbing  Skin: No rashes or ulcers. Dorsum of R foot with mild fluctuance and point tenderness, skin turgor is good  Neuro: Cranial nerves are grossly intact, general motor weakness, follows commands vaguely  Psych: Moderate insight. Tangential. Verbose    Disposition: SNF    Current Discharge Medication List      START taking these medications    Details   cephALEXin (KEFLEX) 500 mg capsule Take 1 Cap by mouth four (4) times daily for 10 days. Qty: 40 Cap, Refills: 0      linagliptin (TRADJENTA) 5 mg tablet Take 1 Tab by mouth Daily (before breakfast). Qty: 30 Tab, Refills: 0      lisinopril (PRINIVIL, ZESTRIL) 2.5 mg tablet Take 1 Tab by mouth daily. Indications: DIABETIC NEPHROPATHY  Qty: 30 Tab, Refills: 0      oxyCODONE-acetaminophen (PERCOCET) 5-325 mg per tablet Take 1 Tab by mouth every four (4) hours as needed. Max Daily Amount: 6 Tabs. Qty: 15 Tab, Refills: 0      carvedilol (COREG) 12.5 mg tablet Take 1 Tab by mouth two (2) times daily (with meals) for 90 days. Qty: 60 Tab, Refills: 2         CONTINUE these medications which have NOT CHANGED    Details   MAGNESIUM GLYCINATE PO Take 266 mg by mouth daily. COQ10, UBIQUINOL, PO Take 60 mg by mouth daily. cholecalciferol (VITAMIN D3) 1,000 unit tablet Take 5,000 Units by mouth daily. cyanocobalamin 1,000 mcg tablet Take 1,000 mcg by mouth daily. FOLIC ACID PO Take 565 mcg by mouth. aspirin delayed-release 81 mg tablet Take 81 mg by mouth daily.          STOP taking these medications       glipiZIDE SR (GLUCOTROL XL) 2.5 mg CR tablet Comments:   Reason for Stopping:           Activity: Activity as tolerated; elevate RLE when not ambulating  Diet: Cardiac Diet and Diabetic Diet  Wound Care: None needed    Follow-up with Mili Barnes MD in 1-2 weeks as needed     Approximate time spent in patient care on day of discharge: 33 minutes     Signed:  Rivera Reina MD  2/18/2017  10:25 AM

## 2017-02-18 NOTE — PROGRESS NOTES
Son refusing to take patient to SNF as R toe nails not debrided. Reviewed podiatry note. Apparently pt refused to have them debrided. Called pt's daughter to discuss that we cannot keep pt in the hospital until Monday to wait for podiatry to debride since this was offered to him on 2/17 and refused. Message left as no answer.  Will see if podiatry is willing to come in today, but if not, will need to discuss with family

## 2017-02-23 ENCOUNTER — TELEPHONE (OUTPATIENT)
Dept: INTERNAL MEDICINE CLINIC | Age: 77
End: 2017-02-23

## 2017-02-23 NOTE — TELEPHONE ENCOUNTER
Call completed to [facility] with [facility], two patient identifiers verified. Advised Radha that Dr. Jose Luis Banda has NOT seen the patient since 12/11/2015. As a result, Dr. Jose Luis Banda would not be able to sign off on any home health orders until an appointment was schedule with our office. Radha verbalized an understanding and will have the patient/patient's family contact our office to schedule a [facility] appointment.

## 2017-02-23 NOTE — TELEPHONE ENCOUNTER
Louisa James//[facility] states she needs a call back to discuss Home Health orders & plan of car for patient. Please call.  Thank you

## 2017-02-28 ENCOUNTER — TELEPHONE (OUTPATIENT)
Dept: INTERNAL MEDICINE CLINIC | Age: 77
End: 2017-02-28

## 2017-03-03 ENCOUNTER — TELEPHONE (OUTPATIENT)
Dept: INTERNAL MEDICINE CLINIC | Age: 77
End: 2017-03-03

## 2017-03-03 NOTE — TELEPHONE ENCOUNTER
Call attempted, there was no answer. There was no voice mail established. If/ when patient returns the call, please have him schedule an appointment. We will NOT contact him after receiving his records from [facility]. Patient needs the next available appointment for 30 minutes.

## 2017-03-03 NOTE — TELEPHONE ENCOUNTER
Patient states records are [place] the way [place] [facility] [place] be here in about 3 days. Patient states once Dr. Myrna Hernandez has a chance to review records he would like a call to set up his next appt with Dr. Myrna Hernandez. Please call if any questions or when received.  Thank you

## 2017-03-07 RX ORDER — ISOSORBIDE MONONITRATE 30 MG/1
TABLET, EXTENDED RELEASE ORAL
Qty: 60 TAB | Refills: 0 | Status: SHIPPED | OUTPATIENT
Start: 2017-03-07 | End: 2017-10-31 | Stop reason: SDUPTHER

## 2017-03-07 NOTE — TELEPHONE ENCOUNTER
Call completed to [facility]. Patient has been non-compliant with medication. The RX was filled and picked up on 12/15/2015 with 6 refills. After he refilled and pick-up another on 05/19/2016. You did authorize these two Rxs. Please advise.

## 2017-03-07 NOTE — TELEPHONE ENCOUNTER
Call completed, two patient identifiers verified. Patient advised that Dr. Emmanuelle Esquivel is in clinic and a nurse would be happy to assist. Patient reports wanting to schedule an appointment after Dr. Emmanuelle Esquivel reviews his medical records. Patient advised to contact our office in two weeks, if the reports have been received he will be notified and an appointment will be scheduled. Patient verbalized an understanding.

## 2017-03-07 NOTE — TELEPHONE ENCOUNTER
Pt called and states that he is needing a call back from Dr. Chowdhury Mention her self. I attempted to schedule appt but pt is wanting a call from Dr. Chowdhury Mention. Please call pt.

## 2017-06-22 ENCOUNTER — TELEPHONE (OUTPATIENT)
Dept: INTERNAL MEDICINE CLINIC | Age: 77
End: 2017-06-22

## 2017-06-22 NOTE — TELEPHONE ENCOUNTER
Call attempted to patient, there was no answer. A recording states the voicemail box has not been set up yet. No message left.

## 2017-07-16 ENCOUNTER — HOSPITAL ENCOUNTER (EMERGENCY)
Age: 77
Discharge: HOME | End: 2017-07-16
Attending: EMERGENCY MEDICINE | Admitting: EMERGENCY MEDICINE
Payer: COMMERCIAL

## 2017-07-16 ENCOUNTER — APPOINTMENT (OUTPATIENT)
Dept: ULTRASOUND IMAGING | Age: 77
End: 2017-07-16
Attending: EMERGENCY MEDICINE
Payer: COMMERCIAL

## 2017-07-16 VITALS
RESPIRATION RATE: 22 BRPM | TEMPERATURE: 98.7 F | HEIGHT: 71 IN | HEART RATE: 85 BPM | SYSTOLIC BLOOD PRESSURE: 176 MMHG | DIASTOLIC BLOOD PRESSURE: 81 MMHG | OXYGEN SATURATION: 94 % | BODY MASS INDEX: 33.6 KG/M2 | WEIGHT: 240 LBS

## 2017-07-16 DIAGNOSIS — N48.89 PENILE EDEMA: Primary | ICD-10-CM

## 2017-07-16 LAB
ALBUMIN SERPL BCP-MCNC: 3.1 G/DL (ref 3.5–5)
ALBUMIN/GLOB SERPL: 0.8 {RATIO} (ref 1.1–2.2)
ALP SERPL-CCNC: 120 U/L (ref 45–117)
ALT SERPL-CCNC: 46 U/L (ref 12–78)
ANION GAP BLD CALC-SCNC: 6 MMOL/L (ref 5–15)
AST SERPL W P-5'-P-CCNC: 35 U/L (ref 15–37)
BASOPHILS # BLD AUTO: 0 K/UL (ref 0–0.1)
BASOPHILS # BLD: 1 % (ref 0–1)
BILIRUB SERPL-MCNC: 1.3 MG/DL (ref 0.2–1)
BUN SERPL-MCNC: 31 MG/DL (ref 6–20)
BUN/CREAT SERPL: 14 (ref 12–20)
CALCIUM SERPL-MCNC: 8.8 MG/DL (ref 8.5–10.1)
CHLORIDE SERPL-SCNC: 104 MMOL/L (ref 97–108)
CO2 SERPL-SCNC: 32 MMOL/L (ref 21–32)
CREAT SERPL-MCNC: 2.22 MG/DL (ref 0.7–1.3)
EOSINOPHIL # BLD: 0.2 K/UL (ref 0–0.4)
EOSINOPHIL NFR BLD: 3 % (ref 0–7)
ERYTHROCYTE [DISTWIDTH] IN BLOOD BY AUTOMATED COUNT: 14.9 % (ref 11.5–14.5)
GLOBULIN SER CALC-MCNC: 3.8 G/DL (ref 2–4)
GLUCOSE SERPL-MCNC: 69 MG/DL (ref 65–100)
HCT VFR BLD AUTO: 36.4 % (ref 36.6–50.3)
HGB BLD-MCNC: 12.4 G/DL (ref 12.1–17)
LYMPHOCYTES # BLD AUTO: 21 % (ref 12–49)
LYMPHOCYTES # BLD: 1.1 K/UL (ref 0.8–3.5)
MCH RBC QN AUTO: 31.6 PG (ref 26–34)
MCHC RBC AUTO-ENTMCNC: 34.1 G/DL (ref 30–36.5)
MCV RBC AUTO: 92.9 FL (ref 80–99)
MONOCYTES # BLD: 0.6 K/UL (ref 0–1)
MONOCYTES NFR BLD AUTO: 11 % (ref 5–13)
NEUTS SEG # BLD: 3.4 K/UL (ref 1.8–8)
NEUTS SEG NFR BLD AUTO: 64 % (ref 32–75)
PLATELET # BLD AUTO: 204 K/UL (ref 150–400)
POTASSIUM SERPL-SCNC: 4.6 MMOL/L (ref 3.5–5.1)
PROT SERPL-MCNC: 6.9 G/DL (ref 6.4–8.2)
RBC # BLD AUTO: 3.92 M/UL (ref 4.1–5.7)
SODIUM SERPL-SCNC: 142 MMOL/L (ref 136–145)
WBC # BLD AUTO: 5.3 K/UL (ref 4.1–11.1)

## 2017-07-16 PROCEDURE — 99284 EMERGENCY DEPT VISIT MOD MDM: CPT

## 2017-07-16 PROCEDURE — 76870 US EXAM SCROTUM: CPT

## 2017-07-16 PROCEDURE — 74011250636 HC RX REV CODE- 250/636

## 2017-07-16 PROCEDURE — 36415 COLL VENOUS BLD VENIPUNCTURE: CPT | Performed by: EMERGENCY MEDICINE

## 2017-07-16 PROCEDURE — 77030005518 HC CATH URETH FOL 2W BARD -B

## 2017-07-16 PROCEDURE — 74011000250 HC RX REV CODE- 250: Performed by: EMERGENCY MEDICINE

## 2017-07-16 PROCEDURE — 51702 INSERT TEMP BLADDER CATH: CPT

## 2017-07-16 PROCEDURE — 85025 COMPLETE CBC W/AUTO DIFF WBC: CPT | Performed by: EMERGENCY MEDICINE

## 2017-07-16 PROCEDURE — 96374 THER/PROPH/DIAG INJ IV PUSH: CPT

## 2017-07-16 PROCEDURE — 96361 HYDRATE IV INFUSION ADD-ON: CPT

## 2017-07-16 PROCEDURE — 80053 COMPREHEN METABOLIC PANEL: CPT | Performed by: EMERGENCY MEDICINE

## 2017-07-16 PROCEDURE — 74011250636 HC RX REV CODE- 250/636: Performed by: EMERGENCY MEDICINE

## 2017-07-16 RX ORDER — MORPHINE SULFATE 2 MG/ML
4 INJECTION, SOLUTION INTRAMUSCULAR; INTRAVENOUS
Status: DISCONTINUED | OUTPATIENT
Start: 2017-07-16 | End: 2017-07-16

## 2017-07-16 RX ORDER — SODIUM CHLORIDE 0.9 % (FLUSH) 0.9 %
5-10 SYRINGE (ML) INJECTION EVERY 8 HOURS
Status: DISCONTINUED | OUTPATIENT
Start: 2017-07-16 | End: 2017-07-16 | Stop reason: HOSPADM

## 2017-07-16 RX ORDER — SODIUM CHLORIDE 0.9 % (FLUSH) 0.9 %
5-10 SYRINGE (ML) INJECTION AS NEEDED
Status: DISCONTINUED | OUTPATIENT
Start: 2017-07-16 | End: 2017-07-16 | Stop reason: HOSPADM

## 2017-07-16 RX ORDER — MORPHINE SULFATE 4 MG/ML
INJECTION, SOLUTION INTRAMUSCULAR; INTRAVENOUS
Status: DISCONTINUED
Start: 2017-07-16 | End: 2017-07-16 | Stop reason: HOSPADM

## 2017-07-16 RX ORDER — LIDOCAINE HYDROCHLORIDE 20 MG/ML
JELLY TOPICAL
Status: COMPLETED | OUTPATIENT
Start: 2017-07-16 | End: 2017-07-16

## 2017-07-16 RX ORDER — MORPHINE SULFATE 4 MG/ML
4 INJECTION, SOLUTION INTRAMUSCULAR; INTRAVENOUS
Status: DISCONTINUED | OUTPATIENT
Start: 2017-07-16 | End: 2017-07-16 | Stop reason: HOSPADM

## 2017-07-16 RX ADMIN — SODIUM CHLORIDE 500 ML: 900 INJECTION, SOLUTION INTRAVENOUS at 04:48

## 2017-07-16 RX ADMIN — LIDOCAINE HYDROCHLORIDE: 20 JELLY TOPICAL at 04:05

## 2017-07-16 NOTE — ED TRIAGE NOTES
Assumed care of pt from triage. Pt complaining of scrotal swelling. Pt has hx of issues with scrotal swelling and circumcision complications. Pt states swelling started 5-6 days ago. Testicles appear twisted. Pt has been urinating, but \"it comes out in 4 or 5 spots\" per pt. Pt has DM, HTN, and renal disease from trauma. Pt has hx of CHF and trauma involving kidney as a teenager. Pt A&Ox4 and in no acute distress at this time. Call bell within reach.

## 2017-07-16 NOTE — DISCHARGE INSTRUCTIONS
Penis Pain: Care Instructions  Your Care Instructions  There can be different types of pain in a penis. For example, your penis may be red or sore. Or you may notice pain along with a rash or an unusual discharge. Sometimes men feel pain when they urinate or in their testicles. Penis pain has many causes. For example, the penis can be injured during sports or a fall. Strenuous sex or long periods of sexual activity can also cause pain. In some cases, the pain is caused by an infection like a urinary tract infection (UTI) or a sexually transmitted infection (STI). In other cases, the pain is caused by another health problem such as Peyronie's disease. And sometimes doctors can't find a cause. Your doctor will first do a physical exam of the penis and possibly the rectum and testicles. Your doctor may also do other tests, such as a urine test or an ultrasound. Your treatment depends on the cause of the pain, if known. Follow-up care is a key part of your treatment and safety. Be sure to make and go to all appointments, and call your doctor if you are having problems. It's also a good idea to know your test results and keep a list of the medicines you take. How can you care for yourself at home? · Rest until you feel better. Don't do anything that may cause pain or soreness. · If the pain is the result of an injury, put ice or a cold pack on the area for 10 to 20 minutes at a time. Put a thin cloth between the ice and your skin. And wear jockey shorts, not boxers, for extra support. Some men find that wearing a jock strap helps to relieve pain. · Take an over-the-counter pain medicine, such as acetaminophen (Tylenol), ibuprofen (Advil, Motrin), or naproxen (Aleve). Be safe with medicines. Read and follow all instructions on the label. · If your doctor prescribed antibiotics, take them as directed. Do not stop taking them just because you feel better. You need to take the full course of antibiotics.   When should you call for help? Call your doctor now or seek immediate medical care if:  · You have severe pain. · Your pain gets worse. · You have new symptoms, such as a rash or swelling. · You have symptoms of a urinary tract infection. These may include:  ¨ Pain or burning when you urinate. ¨ A frequent need to urinate without being able to pass much urine. ¨ Pain in the flank, which is just below the rib cage and above the waist on either side of the back. ¨ Blood in your urine. ¨ A fever. Watch closely for changes in your health, and be sure to contact your doctor if:  · You do not get better as expected. Where can you learn more? Go to http://rema-teetee.info/. Enter P904 in the search box to learn more about \"Penis Pain: Care Instructions. \"  Current as of: March 14, 2017  Content Version: 11.3  © 2781-6629 [employer]. Care instructions adapted under license by [employer] (which disclaims liability or warranty for this information). If you have questions about a medical condition or this instruction, always ask your healthcare professional. [place] [address] any warranty or liability for your use of this information.

## 2017-07-16 NOTE — ED NOTES
Gave patient his clothing and asked him to get dressed. Pt states that he doesn't know where his ride is.

## 2017-07-16 NOTE — ED NOTES
Bedside shift change report given to Jose R Girard RN (oncoming nurse) by Tj Simpson RN (offgoing nurse). Report included the following information SBAR, Kardex, ED Summary, MAR, Recent Results and Cardiac Rhythm NSR.

## 2017-07-16 NOTE — ED NOTES
Patient wanted to talk and was moving very slowly while getting dressed. Pt is dressed now, sitting in the wheelchair. I spoke with the patients ride and he states that he will be here in the next 15 minutes to get the patient.

## 2017-07-16 NOTE — ED NOTES
Pt resting quietly and in no acute distress at this time. Pt provided with snack and water. Okay per MD. Call bell within reach.

## 2017-07-16 NOTE — ED NOTES
Patient phone, bag, cord and underwear were placed in a bag and given to the patient . Pt had his wallet in his pocket. Pt was wheeled to the waiting room where he could wait for his friend. Pt had his car keys in his hand.

## 2017-07-16 NOTE — ED PROVIDER NOTES
HPI Comments: Enrico Block., 78 y.o. Male with PMHx of DM / HTN / CAD / CKD, presents ambulatory to [facility] ED with cc of scrotal swelling worsening over the past couple of days. He states it has swallowed the head of his penis and he is making a mess when he tries to urinate. Denies any pain. Specifically states that if he tries to manipulate the scrotum it hurts worse. He specifically denies any dysuria, urgency, frequency, fevers, chills, nausea, vomiting, chest pain, shortness of breath, headache, rash, diarrhea, sweating or weight loss. PCP: Leonard Lincoln MD    Social history significant for: - Tobacco, + EtOH, - Illicit Drug Use    There are no other complaints, changes, or physical findings at this time. The history is provided by the patient. Past Medical History:   Diagnosis Date    Aortic stenosis     CAD (coronary artery disease)     CKD (chronic kidney disease) stage 4, GFR 15-29 ml/min ([facility] [address] 2/14/2017    Diabetes ([facility] [address]     Hypertension     Pulmonary hypertension ([facility] [address]        Past Surgical History:   Procedure Laterality Date    CARDIAC SURG PROCEDURE UNLIST      cardiac cath without stents x3         Family History:   Problem Relation Age of Onset    Diabetes Father        Social History     Social History    Marital status:      Spouse name: N/A    Number of children: N/A    Years of education: N/A     Occupational History    Not on file. Social History Main Topics    Smoking status: Never Smoker    Smokeless tobacco: Never Used    Alcohol use 0.0 oz/week     0 Standard drinks or equivalent per week      Comment: Rare    Drug use: No    Sexual activity: Not on file     Other Topics Concern    Not on file     Social History Narrative         ALLERGIES: Albuterol; Contrast agent [iodine]; Iodinated contrast- oral and iv dye; Norvasc [amlodipine]; and Pcn [penicillins]    Review of Systems   Constitutional: Negative.   Negative for activity change, appetite change, chills, fatigue, fever and unexpected weight change. HENT: Negative. Negative for congestion, hearing loss, rhinorrhea, sneezing and voice change. Eyes: Negative. Negative for pain and visual disturbance. Respiratory: Negative. Negative for apnea, cough, choking, chest tightness and shortness of breath. Cardiovascular: Negative. Negative for chest pain and palpitations. Gastrointestinal: Negative. Negative for abdominal distention, abdominal pain, blood in stool, diarrhea, nausea and vomiting. Genitourinary: Positive for penile swelling and scrotal swelling. Negative for dysuria, flank pain, frequency and urgency. No discharge   Musculoskeletal: Negative. Negative for arthralgias, back pain, myalgias and neck stiffness. Skin: Negative. Negative for color change and rash. Neurological: Negative. Negative for dizziness, seizures, syncope, speech difficulty, weakness, numbness and headaches. Hematological: Negative for adenopathy. Psychiatric/Behavioral: Negative. Negative for agitation, behavioral problems, dysphoric mood and suicidal ideas. The patient is not nervous/anxious. Vitals:    07/16/17 0235 07/16/17 0245 07/16/17 0300   BP: 169/75 160/79 161/81   Pulse: 87 87 85   Resp: 16 20 20   Temp: 98.7 °F (37.1 °C)     SpO2: 96% 97% 97%   Weight: 108.9 kg (240 lb)     Height: 5' 11\" (1.803 m)              Physical Exam   Constitutional: He is oriented to person, place, and time. He appears well-developed and well-nourished. No distress. HENT:   Head: Normocephalic and atraumatic. Mouth/Throat: Oropharynx is clear and moist. No oropharyngeal exudate. Eyes: Conjunctivae and EOM are normal. Pupils are equal, round, and reactive to light. Right eye exhibits no discharge. Left eye exhibits no discharge. Neck: Normal range of motion. Neck supple. Cardiovascular: Normal rate, regular rhythm and intact distal pulses. Exam reveals no gallop and no friction rub. No murmur heard. Pulmonary/Chest: Effort normal and breath sounds normal. No respiratory distress. He has no wheezes. He has no rales. He exhibits no tenderness. Abdominal: Soft. Bowel sounds are normal. He exhibits no distension and no mass. There is no tenderness. There is no rebound and no guarding. Genitourinary:   Genitourinary Comments: Penis and scrotum are twisted and deformed. Distorted anatomy secondary to edema. Musculoskeletal: Normal range of motion. He exhibits no edema. Lymphadenopathy:     He has no cervical adenopathy. Neurological: He is alert and oriented to person, place, and time. No cranial nerve deficit. Coordination normal.   Skin: Skin is warm and dry. No rash noted. No erythema. Psychiatric: He has a normal mood and affect. Nursing note and vitals reviewed. MDM  Number of Diagnoses or Management Options  Penile edema:   Diagnosis management comments: Idiopathic penile edema. Will assess with US of testicles and place christine catheter to preserve anatomical landmarks. Amount and/or Complexity of Data Reviewed  Clinical lab tests: ordered and reviewed  Tests in the radiology section of CPT®: ordered and reviewed  Review and summarize past medical records: yes  Independent visualization of images, tracings, or specimens: yes      ED Course       Procedures      Chief Complaint   Patient presents with    Testicle Pain     scrotal swelling       3:06 AM  The patients presenting problems have been discussed, and they are in agreement with the care plan formulated and outlined with them. I have encouraged them to ask questions as they arise throughout their visit.     MEDICATIONS GIVEN:  Medications   sodium chloride (NS) flush 5-10 mL (not administered)   sodium chloride (NS) flush 5-10 mL (not administered)   morphine injection 4 mg (4 mg IntraVENous Refused 7/16/17 0680)   sodium chloride 0.9 % bolus infusion 500 mL (500 mL IntraVENous New Bag 7/16/17 4058) lidocaine (URO-JET) 2 % jelly ( Urethral Given 7/16/17 4820)       LABS REVIEWED:  Recent Results (from the past 24 hour(s))   CBC WITH AUTOMATED DIFF    Collection Time: 07/16/17  4:39 AM   Result Value Ref Range    WBC 5.3 4.1 - 11.1 K/uL    RBC 3.92 (L) 4.10 - 5.70 M/uL    HGB 12.4 12.1 - 17.0 g/dL    HCT 36.4 (L) 36.6 - 50.3 %    MCV 92.9 80.0 - 99.0 FL    MCH 31.6 26.0 - 34.0 PG    MCHC 34.1 30.0 - 36.5 g/dL    RDW 14.9 (H) 11.5 - 14.5 %    PLATELET 190 652 - 769 K/uL    NEUTROPHILS 64 32 - 75 %    LYMPHOCYTES 21 12 - 49 %    MONOCYTES 11 5 - 13 %    EOSINOPHILS 3 0 - 7 %    BASOPHILS 1 0 - 1 %    ABS. NEUTROPHILS 3.4 1.8 - 8.0 K/UL    ABS. LYMPHOCYTES 1.1 0.8 - 3.5 K/UL    ABS. MONOCYTES 0.6 0.0 - 1.0 K/UL    ABS. EOSINOPHILS 0.2 0.0 - 0.4 K/UL    ABS. BASOPHILS 0.0 0.0 - 0.1 K/UL       VITAL SIGNS:  Patient Vitals for the past 12 hrs:   Temp Pulse Resp BP SpO2   07/16/17 0300 - 85 20 161/81 97 %   07/16/17 0245 - 87 20 160/79 97 %   07/16/17 0235 98.7 °F (37.1 °C) 87 16 169/75 96 %       RADIOLOGY RESULTS:  The following have been ordered and reviewed:  US SCROTUM/TESTICLES   Final Result   EXAM:  US SCROTUM/TESTICLES      INDICATION: Scrotal edema. .     COMPARISON: None.     TECHNIQUE:  Real-time sonography of the scrotum was performed with a high frequency linear  transducer. Multiple static images were obtained. Color Doppler evaluation was  also performed.     FINDINGS:     GENERAL: There is generalized subcutaneous edema of the scrotum and penis  without localized flow obvious specular reflections suggestive of soft tissue  air. No hydrocele is shown.     RIGHT TESTICLE: The right testicle is normal in size and echotexture with normal  color-flow. The right testis measures 3.7 x 2.8 x 2.1 cm and the right  testicular volume is 11.2 mL.      RIGHT EPIDIDYMIS: The right epididymis is normal.     LEFT TESTICLE: The left testicle is normal in size and echotexture with normal  color-flow.   The left testis measures 3.2 x 2.1 x 2.0 cm and the left testicular  volume is 6.8 mL.     LEFT EPIDIDYMIS: The left epididymis is normal.     INGUINAL REGIONS: No mass or hernia demonstrated.     IMPRESSION  IMPRESSION: Diffuse scrotal and penile edema. Normal appearing testes. PROGRESS NOTES:    Progress Note:  3:06 AM  Initial attempts at catheterization were unsuccessful. Written by Tony Mims ED Scribe, as dictated by Jorge Florian. Becca Blake MD.    Progress Note:  5:00 AM  Rogel catheter is in place to preserve landmarks and anatomy. The ultrasound is negative for torsion but he does have mild diffuse edema to the penis. Will discharge and have patient follow-up with urology. Written by MARIANA Godoyibe, as dictated by Jorge Florian. Becca Blake MD.    DIAGNOSIS:    1. Penile edema        PLAN:  Follow-up Information     Follow up With Details Comments Contact Info    Corbin Jara MD Call in 3 days  [address] [place] [address]  [phone]          Current Discharge Medication List            ED COURSE: The patients hospital course has been uncomplicated. 5:10 AM  Tiffany Auguste Jr.'s  results have been reviewed with him. He has been counseled regarding his diagnosis. He verbally conveys understanding and agreement of the signs, symptoms, diagnosis, treatment and prognosis and additionally agrees to follow up as recommended with Dr. Ananda Rodrigez MD in 24 - 48 hours. He also agrees with the care-plan and conveys that all of his questions have been answered. I have also put together some discharge instructions for him that include: 1) educational information regarding their diagnosis, 2) how to care for their diagnosis at home, as well a 3) list of reasons why they would want to return to the ED prior to their follow-up appointment, should their condition change. This note is prepared by Tony Mims, acting as a Scribe for Jorge Florian.  Becca Blake MD.    Nikhil Pyle. Eliel Garvin MD: The scribe's documentation has been prepared under my direction and personally reviewed by me in its entirety. I confirm that the notes above accurately reflects all work, treatment, procedures, and medical decision making performed by me.

## 2017-07-16 NOTE — PROGRESS NOTES
78 y.o. Male with PMHx of DM / HTN / CAD / CKD, presents ambulatory to ED [facility] ED with cc of scrotal swelling worsening over the past couple of days. He states it has swallowed the head of his penis and he is making a mess when he tries to urinate. CM was called to ED at approximately 1:30PM  to assist with patient care and disposition. Patient drove himself to ED in private vehicle - arrival time 1AM.  Patient was seen, evaluated and discharged to home w/ indwelling christine catheter and plan for follow-up with urology. CM unable to make follow-up appointment as it is Sunday and no one available. Patient has been sitting in wheelchair in waiting area since ED discharge at 10:21AM.  Patient states he does not want to \"go home with ALL OF THIS\" pointing to christine bag. Patient has a male friend in ED with him that he is calling \"\". Both persons refuse to provide any home information or caregiver information. Patient refuses to stand or to discuss plan of care other than him wanting to \"stay at the hospital\". Patient and friend have both been taught by multiple hospital staff how to empty catheter, correct positioning and plan for follow-up. Patient was insisting that he \"can't leave car here\" and wants to drive home, yet states he does not feel comfortable doing so. Patient was told he could ride home w/ his friend and leave car for  later - refuse. Patient is delaying decision making stating \"wait, wait. Frutoso Golder Frutoso Golder \" or \"I can do what I want\". He is not physically aggressive, just not making decisions and not moving and not willing to provide caregiver contact or allow friend present in ED to disclose any information. Per chart review, patient has Hx of  HTN, DM, CAD, and Dementia. Consuelo Escalona is a person listed as Emergency Contact on facsheet, but this phone number is not operative. Patient was offered cab ride home if he did not want to get into car with his friend.   Friend states he will get patient home and make sure that he has 24 hour supervision. Due to patient's demeanor and apparent lack of ability to retain information, restate information clearly, refusal to provide information to CM and refusal to make decisions about care, this CM will refer to [facility] for safety evaluation. In-Basket message sent to NN listed in chart - Edd Galindo - [phone] - with Dr. Lanny Marshall office - to follow-up with patient. Patient has follow-up appointment w/ PCP scheduled for 8/7/2017. Patient finally ambulated to friend's car and got into vehicle under his own power. He was able to handle christine catheter bag and get into vehicle unassisted - refused any help. Care Management Interventions  PCP Verified by CM: Yes (Dr. Ann Marie James)  Mode of Transport at Discharge: Other (see comment) (Friend at hospital - patient called him [facility] Transport Time of Discharge: 8726  Transition of Care Consult (CM Consult): Other (Please see CM notes)  Discharge Durable Medical Equipment: No (Patient ambulating independently - requires prompting)  Current Support Network:  (Unable to verify home situation - patient refusing to provide information)  Confirm Follow Up Transport:  (Patient drove himself to hospital and was taken home by a friend - left his vehicle in hospital ED parking lot)  Plan discussed with Pt/Family/Caregiver: Yes (CM discussed plan of care with patient and his friend present in ED)  Discharge Location  Discharge Placement: Home (Home with friend that states he will make sure someone is available to assist patient - he would not provide any contact information)     Nursing Supervisor, ED charge nurse and CM all assisted patient with discharge from ED w/ his friend (friend driving) with the understanding that CM would file [facility] report and send information to PCP NN.     Shiloh Higginbotham, RN, BSN, ACM  ED Case Manager  [phone]

## 2017-07-17 ENCOUNTER — APPOINTMENT (OUTPATIENT)
Dept: CT IMAGING | Age: 77
End: 2017-07-17
Attending: EMERGENCY MEDICINE
Payer: MEDICARE

## 2017-07-17 ENCOUNTER — HOSPITAL ENCOUNTER (EMERGENCY)
Age: 77
Discharge: HOME | End: 2017-07-17
Attending: EMERGENCY MEDICINE
Payer: MEDICARE

## 2017-07-17 VITALS
SYSTOLIC BLOOD PRESSURE: 184 MMHG | BODY MASS INDEX: 33.6 KG/M2 | OXYGEN SATURATION: 97 % | HEIGHT: 71 IN | HEART RATE: 86 BPM | TEMPERATURE: 98 F | DIASTOLIC BLOOD PRESSURE: 73 MMHG | RESPIRATION RATE: 16 BRPM | WEIGHT: 240 LBS

## 2017-07-17 DIAGNOSIS — T83.9XXA COMPLICATION OF FOLEY CATHETER, INITIAL ENCOUNTER (HCC): ICD-10-CM

## 2017-07-17 DIAGNOSIS — N50.89 SCROTAL EDEMA: ICD-10-CM

## 2017-07-17 DIAGNOSIS — R60.0 BILATERAL EDEMA OF LOWER EXTREMITY: ICD-10-CM

## 2017-07-17 DIAGNOSIS — F03.91 DEMENTIA WITH BEHAVIORAL DISTURBANCE, UNSPECIFIED DEMENTIA TYPE: ICD-10-CM

## 2017-07-17 DIAGNOSIS — N48.89 PENILE PAIN: Primary | ICD-10-CM

## 2017-07-17 LAB
ALBUMIN SERPL BCP-MCNC: 3.2 G/DL (ref 3.5–5)
ALBUMIN/GLOB SERPL: 0.8 {RATIO} (ref 1.1–2.2)
ALP SERPL-CCNC: 116 U/L (ref 45–117)
ALT SERPL-CCNC: 38 U/L (ref 12–78)
AMPHET UR QL SCN: NEGATIVE
ANION GAP BLD CALC-SCNC: 9 MMOL/L (ref 5–15)
APPEARANCE UR: CLEAR
AST SERPL W P-5'-P-CCNC: 28 U/L (ref 15–37)
BACTERIA URNS QL MICRO: NEGATIVE /HPF
BARBITURATES UR QL SCN: NEGATIVE
BASOPHILS # BLD AUTO: 0 K/UL (ref 0–0.1)
BASOPHILS # BLD: 0 % (ref 0–1)
BENZODIAZ UR QL: NEGATIVE
BILIRUB SERPL-MCNC: 1.1 MG/DL (ref 0.2–1)
BILIRUB UR QL: NEGATIVE
BUN SERPL-MCNC: 35 MG/DL (ref 6–20)
BUN/CREAT SERPL: 15 (ref 12–20)
CALCIUM SERPL-MCNC: 8.7 MG/DL (ref 8.5–10.1)
CANNABINOIDS UR QL SCN: NEGATIVE
CHLORIDE SERPL-SCNC: 105 MMOL/L (ref 97–108)
CO2 SERPL-SCNC: 26 MMOL/L (ref 21–32)
COCAINE UR QL SCN: NEGATIVE
COLOR UR: ABNORMAL
CREAT SERPL-MCNC: 2.33 MG/DL (ref 0.7–1.3)
DIFFERENTIAL METHOD BLD: ABNORMAL
DRUG SCRN COMMENT,DRGCM: NORMAL
EOSINOPHIL # BLD: 0.1 K/UL (ref 0–0.4)
EOSINOPHIL NFR BLD: 1 % (ref 0–7)
EPITH CASTS URNS QL MICRO: NORMAL /LPF
ERYTHROCYTE [DISTWIDTH] IN BLOOD BY AUTOMATED COUNT: 14.8 % (ref 11.5–14.5)
ETHANOL SERPL-MCNC: <10 MG/DL
GLOBULIN SER CALC-MCNC: 3.9 G/DL (ref 2–4)
GLUCOSE SERPL-MCNC: 113 MG/DL (ref 65–100)
GLUCOSE UR STRIP.AUTO-MCNC: NEGATIVE MG/DL
HCT VFR BLD AUTO: 35.8 % (ref 36.6–50.3)
HGB BLD-MCNC: 11.8 G/DL (ref 12.1–17)
HGB UR QL STRIP: ABNORMAL
HYALINE CASTS URNS QL MICRO: NORMAL /LPF (ref 0–5)
KETONES UR QL STRIP.AUTO: ABNORMAL MG/DL
LEUKOCYTE ESTERASE UR QL STRIP.AUTO: NEGATIVE
LYMPHOCYTES # BLD AUTO: 9 % (ref 12–49)
LYMPHOCYTES # BLD: 0.8 K/UL (ref 0.8–3.5)
MCH RBC QN AUTO: 30.6 PG (ref 26–34)
MCHC RBC AUTO-ENTMCNC: 33 G/DL (ref 30–36.5)
MCV RBC AUTO: 93 FL (ref 80–99)
METHADONE UR QL: NEGATIVE
MONOCYTES # BLD: 1 K/UL (ref 0–1)
MONOCYTES NFR BLD AUTO: 12 % (ref 5–13)
NEUTS SEG # BLD: 6.5 K/UL (ref 1.8–8)
NEUTS SEG NFR BLD AUTO: 78 % (ref 32–75)
NITRITE UR QL STRIP.AUTO: NEGATIVE
OPIATES UR QL: NEGATIVE
PCP UR QL: NEGATIVE
PH UR STRIP: 6.5 [PH] (ref 5–8)
PLATELET # BLD AUTO: 221 K/UL (ref 150–400)
POTASSIUM SERPL-SCNC: 3.5 MMOL/L (ref 3.5–5.1)
PROT SERPL-MCNC: 7.1 G/DL (ref 6.4–8.2)
PROT UR STRIP-MCNC: 300 MG/DL
RBC # BLD AUTO: 3.85 M/UL (ref 4.1–5.7)
RBC #/AREA URNS HPF: NORMAL /HPF (ref 0–5)
RBC MORPH BLD: ABNORMAL
SODIUM SERPL-SCNC: 140 MMOL/L (ref 136–145)
SP GR UR REFRACTOMETRY: 1.02 (ref 1–1.03)
TSH SERPL DL<=0.05 MIU/L-ACNC: 2.79 UIU/ML (ref 0.36–3.74)
UROBILINOGEN UR QL STRIP.AUTO: 0.2 EU/DL (ref 0.2–1)
WBC # BLD AUTO: 8.4 K/UL (ref 4.1–11.1)
WBC URNS QL MICRO: NORMAL /HPF (ref 0–4)

## 2017-07-17 PROCEDURE — 81001 URINALYSIS AUTO W/SCOPE: CPT | Performed by: EMERGENCY MEDICINE

## 2017-07-17 PROCEDURE — 36415 COLL VENOUS BLD VENIPUNCTURE: CPT | Performed by: EMERGENCY MEDICINE

## 2017-07-17 PROCEDURE — 77030005563 HC CATH URETH INT MMGH -A

## 2017-07-17 PROCEDURE — 80307 DRUG TEST PRSMV CHEM ANLYZR: CPT | Performed by: EMERGENCY MEDICINE

## 2017-07-17 PROCEDURE — 99284 EMERGENCY DEPT VISIT MOD MDM: CPT

## 2017-07-17 PROCEDURE — 80053 COMPREHEN METABOLIC PANEL: CPT | Performed by: EMERGENCY MEDICINE

## 2017-07-17 PROCEDURE — 84443 ASSAY THYROID STIM HORMONE: CPT | Performed by: EMERGENCY MEDICINE

## 2017-07-17 PROCEDURE — 85025 COMPLETE CBC W/AUTO DIFF WBC: CPT | Performed by: EMERGENCY MEDICINE

## 2017-07-17 NOTE — ED TRIAGE NOTES
Triage note: Pt arrives with c/o \"there's a rip in my penis. You people did this at the other hospital. Y'all think I'm crazy. You don't know about the rip? Y'all don't know about the rip I'm leaving. \" It has taken over an hour to get pt back into room. Pt makes no sense with his stories. Pt arrives with urinary christine in place; puss noted around the area; swollen scrotum and penis.

## 2017-07-17 NOTE — DISCHARGE INSTRUCTIONS
We hope that we have addressed all of your medical concerns. The examination and treatment you received in the Emergency Department were for an emergent problem and were not intended as complete care. It is important that you follow up with your healthcare provider(s) for ongoing care. If your symptoms worsen or do not improve as expected, and you are unable to reach your usual health care provider(s), you should return to the Emergency Department. Today's healthcare is undergoing tremendous change, and patient satisfaction surveys are one of the many tools to assess the quality of medical care. You may receive a survey from the [employer] regarding your experience in the Emergency Department. I hope that your experience has been completely positive, particularly the medical care that I provided. As such, please participate in the survey; anything less than excellent does not meet my expectations or intentions. [facility] and 8 [facility] participate in nationally recognized quality of care measures. If your blood pressure is greater than 120/80, as reported below, we urge that you seek medical care to address the potential of high blood pressure, commonly known as hypertension. Hypertension can be hereditary or can be caused by certain medical conditions, pain, stress, or \"white coat syndrome. \"       Please make an appointment with your health care provider(s) for follow up of your Emergency Department visit. VITALS:   Patient Vitals for the past 8 hrs:   Temp Pulse Resp BP SpO2   07/17/17 0500 - - - 168/68 94 %   07/17/17 0452 - - - 162/73 -   07/17/17 0430 97.8 °F (36.6 °C) 86 16 162/73 95 %          Thank you for allowing us to provide you with medical care today. We realize that you have many choices for your emergency care needs. Please choose us in the future for any continued health care needs.       Regards, Halina Jeans, [address] [place], [place] [address]y 20.   Office: [phone]            Recent Results (from the past 24 hour(s))   URINALYSIS W/ RFLX MICROSCOPIC    Collection Time: 07/17/17  4:40 AM   Result Value Ref Range    Color YELLOW/STRAW      Appearance CLEAR CLEAR      Specific gravity 1.020 1.003 - 1.030      pH (UA) 6.5 5.0 - 8.0      Protein 300 (A) NEG mg/dL    Glucose NEGATIVE  NEG mg/dL    Ketone TRACE (A) NEG mg/dL    Bilirubin NEGATIVE  NEG      Blood LARGE (A) NEG      Urobilinogen 0.2 0.2 - 1.0 EU/dL    Nitrites NEGATIVE  NEG      Leukocyte Esterase NEGATIVE  NEG     CBC WITH AUTOMATED DIFF    Collection Time: 07/17/17  4:40 AM   Result Value Ref Range    WBC 8.4 4.1 - 11.1 K/uL    RBC 3.85 (L) 4.10 - 5.70 M/uL    HGB 11.8 (L) 12.1 - 17.0 g/dL    HCT 35.8 (L) 36.6 - 50.3 %    MCV 93.0 80.0 - 99.0 FL    MCH 30.6 26.0 - 34.0 PG    MCHC 33.0 30.0 - 36.5 g/dL    RDW 14.8 (H) 11.5 - 14.5 %    PLATELET 905 795 - 690 K/uL    NEUTROPHILS 78 (H) 32 - 75 %    LYMPHOCYTES 9 (L) 12 - 49 %    MONOCYTES 12 5 - 13 %    EOSINOPHILS 1 0 - 7 %    BASOPHILS 0 0 - 1 %    ABS. NEUTROPHILS 6.5 1.8 - 8.0 K/UL    ABS. LYMPHOCYTES 0.8 0.8 - 3.5 K/UL    ABS. MONOCYTES 1.0 0.0 - 1.0 K/UL    ABS. EOSINOPHILS 0.1 0.0 - 0.4 K/UL    ABS.  BASOPHILS 0.0 0.0 - 0.1 K/UL    DF SMEAR SCANNED      RBC COMMENTS NORMOCYTIC, NORMOCHROMIC     METABOLIC PANEL, COMPREHENSIVE    Collection Time: 07/17/17  4:40 AM   Result Value Ref Range    Sodium 140 136 - 145 mmol/L    Potassium 3.5 3.5 - 5.1 mmol/L    Chloride 105 97 - 108 mmol/L    CO2 26 21 - 32 mmol/L    Anion gap 9 5 - 15 mmol/L    Glucose 113 (H) 65 - 100 mg/dL    BUN 35 (H) 6 - 20 MG/DL    Creatinine 2.33 (H) 0.70 - 1.30 MG/DL    BUN/Creatinine ratio 15 12 - 20      GFR est AA 33 (L) >60 ml/min/1.73m2    GFR est non-AA 28 (L) >60 ml/min/1.73m2    Calcium 8.7 8.5 - 10.1 MG/DL    Bilirubin, total 1.1 (H) 0.2 - 1.0 MG/DL    ALT (SGPT) 38 12 - 78 U/L    AST (SGOT) 28 15 - 37 U/L    Alk. phosphatase 116 45 - 117 U/L    Protein, total 7.1 6.4 - 8.2 g/dL    Albumin 3.2 (L) 3.5 - 5.0 g/dL    Globulin 3.9 2.0 - 4.0 g/dL    A-G Ratio 0.8 (L) 1.1 - 2.2     ETHYL ALCOHOL    Collection Time: 07/17/17  4:40 AM   Result Value Ref Range    ALCOHOL(ETHYL),SERUM <10 <10 MG/DL   DRUG SCREEN, URINE    Collection Time: 07/17/17  4:40 AM   Result Value Ref Range    AMPHETAMINES NEGATIVE  NEG      BARBITURATES NEGATIVE  NEG      BENZODIAZEPINE NEGATIVE  NEG      COCAINE NEGATIVE  NEG      METHADONE NEGATIVE  NEG      OPIATES NEGATIVE  NEG      PCP(PHENCYCLIDINE) NEGATIVE  NEG      THC (TH-CANNABINOL) NEGATIVE  NEG      Drug screen comment (NOTE)    URINE MICROSCOPIC ONLY    Collection Time: 07/17/17  4:40 AM   Result Value Ref Range    WBC 5-10 0 - 4 /hpf    RBC 10-20 0 - 5 /hpf    Epithelial cells FEW FEW /lpf    Bacteria NEGATIVE  NEG /hpf    Hyaline cast 2-5 0 - 5 /lpf       No results found. Helping A Person With Dementia: Care Instructions  Your Care Instructions  Dementia is a loss of mental skills that affects daily life. It is different from mild memory loss that occurs with aging. Dementia can cause problems with memory, thinking clearly, and planning. It is different for everyone. But it usually gets worse slowly. Some people who have dementia can function well for a long time. But at some point it may become hard for the person to care for himself or herself. It can be upsetting to learn that a loved one has this condition. You may be afraid and worried about what will happen. You may wonder how you will care for the person. There is no cure for dementia. But medicine may be able to slow memory loss and improve thinking for a while. Other medicines may help with sleep, depression, and behavior changes. Dementia is different for everyone. In some cases, people can function well for a long time. You can help your loved one by making his or her home life easier and safer.  You also need to take care of yourself. Caregiving can be stressful. But support is available to help you and give you a break when you need it. The [employer]'s [employer] offers good information and support. If you are caring for someone with dementia, you can help make life safer and more comfortable. You can also help your loved one make decisions about future care. You may also want to bring up legal and financial issues. These are hard but important conversations to have. Follow-up care is a key part of your loved one's treatment and safety. Be sure to make and go to all appointments, and call your doctor if your loved one is having problems. It's also a good idea to know your loved one's test results and keep a list of the medicines he or she takes. How can you care for your loved one at home? Taking care of the person  · If the person takes medicine for dementia, help him or her take it exactly as prescribed. Call the doctor if you notice any problems with the medicine. · Make a list of the person's medicines. Review it with all of his or her doctors. · Help the person eat a balanced diet. Serve plenty of whole grains, fruits, and vegetables every day. If the person is not hungry at mealtimes, give snacks at midmorning and in the afternoon. Offer drinks such as Boost, Ensure, or Sustacal if the person is losing weight. · Encourage exercise. Walking and other activities may slow the decline of mental ability. Help the person stay active mentally with reading, crossword puzzles, or other hobbies. · Take steps to help if the person is sundowning. This is the restless behavior and trouble with sleeping that may occur in late afternoon and at night. Try not to let the person nap during the day. Offer a glass of warm milk or caffeine-free tea before bedtime. · Develop a routine. Your loved one will feel less frustrated or confused with a clear, simple plan of what to do every day. · Be patient.  A task may take the person longer than it used to. · For as long as he or she is able, allow your loved one to make decisions about activities, food, clothing, and other choices. Let him or her be independent, even if tasks take more time or are not done perfectly. Tailor tasks to the person's abilities. For example, if cooking is no longer safe, ask for other help. Your loved one can help set the table, or make simple dishes such as a salad. When the person needs help, offer it gently. Staying safe  · Make your home (or your loved one's home) safe. Tack down rugs, and put no-slip tape in the tub. Install handrails, and put safety switches on stoves and appliances. Keep rooms free of clutter. Make sure walkways around furniture are clear. Do not move furniture around, because the person may become confused. · Use locks on doors and cupboards. Lock up knives, scissors, medicines, cleaning supplies, and other dangerous things. · Do not let the person drive or cook if he or she can't do it safely. A person with dementia should not drive unless he or she is able to pass an on-road driving test. Your state 's license bureau can do a driving test if there is any question. · Get medical alert jewelry for the person so that you can be contacted if he or she wanders away. If possible, provide a safe place for wandering, such as an enclosed yard or garden. Taking care of yourself  · Ask your doctor about support groups and other resources in your area. · Take care of your health. Be sure to eat healthy foods and get enough rest and exercise. · Take time for yourself. Respite services provide someone to stay with the person for a short time while you get out of the house for a few hours. · Make time for an activity that you enjoy. Read, listen to music, paint, do crafts, or play an instrument, even if it's only for a few minutes a day. · Spend time with family, friends, and others in your support system.   When should you call for help? Call 911 anytime you think the person may need emergency care. For example, call if:  · The person who has dementia wanders away and you can't find him or her. · The person who has dementia is seriously injured. Call the doctor now or seek immediate medical care if:  · The person suddenly sees things that are not there (hallucinates). · The person has a sudden change in his or her behavior. Watch closely for changes in the person's health, and be sure to contact the doctor if:  · The person has symptoms that could cause injury. · The person has problems with his or her medicine. · You need more information to care for a person with dementia. · You need respite care so you can take a break. Where can you learn more? Go to http://rema-teetee.info/. Enter D498 in the search box to learn more about \"Helping A Person With Dementia: Care Instructions. \"  Current as of: July 26, 2016  Content Version: 11.3  © 9335-4297 [employer]. Care instructions adapted under license by [employer] (which disclaims liability or warranty for this information). If you have questions about a medical condition or this instruction, always ask your healthcare professional. [address] any warranty or liability for your use of this information.

## 2017-07-17 NOTE — ED PROVIDER NOTES
HPI Comments: 78 y.o. male with past medical history significant for DM, HTN, CAD, and aortic stenosis who presents accompanied by his cousin to be evaluated for confusion. The pt's cousin reports that the pt had a recent onset of confusion 5 hours ago. The cousin explains that the pt was unable to follow a conversation and was not speaking clearly. The cousin says that he has never seen the pt like this before. The pt's cousin reports that the pt has made 4 calls to Dr. Candace Sequeira, with nephrology, and believes that he is waiting for an appointment with the physician. The pt's cousin explains that the pt does not have a scheduled appointment with Dr. Candace Sequeira and the physician recommended he presents to the ED for evaluation. The pt believes he was in the car for 16 hours to drive to the ED. The pt c/o penile pain and leg swelling. The pt says that a christine was inserted \"this morning\" and no one told him when it would be taken out. The pt believes that the catheter should have already been removed. Per the pt's cousin, the pt's legs are swollen and he is worried that the pt may develop sepsis from an infection. The pt reports that he ate very little today. The pt says that he has been out of his CHF medication for the past 3 days. Denies vomiting and abdominal pain. There are no other acute medical concerns at this time. Old Chart Review: He was at [facility] yesterday where penile and scrotum swelling were noted. A christine was placed. US revealed diffuse scrotum and penile edema. Normal CBC yesterday. BUN and creatinine were 31 and 2.22, which is very similar to the BMP from 2/18/2017 (5 months ago). Full history, physical exam, and ROS unable to be obtained due to:  dementia. Social hx: nonsmoker  PCP: Doris Lantigua MD    Note written by Angelo Salazar, as dictated by Shashank Alvarado,  3:44 AM      The history is provided by the patient and a relative (cousin). No  was used. Past Medical History:   Diagnosis Date    Aortic stenosis     CAD (coronary artery disease)     CKD (chronic kidney disease) stage 4, GFR 15-29 ml/min ([facility] 2/14/2017    Diabetes ([facility] [facility]     Hypertension     Pulmonary hypertension ([facility] [address] [facility]        Past Surgical History:   Procedure Laterality Date    CARDIAC SURG PROCEDURE UNLIST      cardiac cath without stents x3         Family History:   Problem Relation Age of Onset    Diabetes Father        Social History     Social History    Marital status:      Spouse name: N/A    Number of children: N/A    Years of education: N/A     Occupational History    Not on file. Social History Main Topics    Smoking status: Never Smoker    Smokeless tobacco: Never Used    Alcohol use 0.0 oz/week     0 Standard drinks or equivalent per week      Comment: Rare    Drug use: No    Sexual activity: Not on file     Other Topics Concern    Not on file     Social History Narrative         ALLERGIES: Albuterol; Contrast agent [iodine]; Iodinated contrast- oral and iv dye; Norvasc [amlodipine]; and Pcn [penicillins]    Review of Systems   Unable to perform ROS: Other (confusion)       Vitals:    07/17/17 0430 07/17/17 0452 07/17/17 0500 07/17/17 0630   BP: 162/73 162/73 168/68 184/73   Pulse: 86   86   Resp: 16   16   Temp: 97.8 °F (36.6 °C)   98 °F (36.7 °C)   SpO2: 95%  94% 97%   Weight: 108.9 kg (240 lb)      Height: 5' 11\" (1.803 m)               Physical Exam   Constitutional: He is oriented to person, place, and time. He appears well-developed and well-nourished. No distress. HENT:   Head: Normocephalic. Nose: Nose normal.   Mouth/Throat: Oropharynx is clear and moist.   Eyes: Conjunctivae are normal. Pupils are equal, round, and reactive to light. Neck: No tracheal deviation present. Cardiovascular: Normal rate, regular rhythm, normal heart sounds and intact distal pulses. Pulmonary/Chest: Effort normal and breath sounds normal.   Abdominal: Soft. He exhibits no distension. There is no tenderness. There is no rebound. Genitourinary:   Genitourinary Comments: Christine in place  Clear urine  Small amount of purulence where catheter enters penis  Diffuse scrotal and penile edema. Neurological: He is alert and oriented to person, place, and time. Good coordination. Shuffling gait. Intact immediate memory 3/3  1/3 5 minute memory recall. Skin: Skin is warm and dry. He is not diaphoretic. [facility]  ED Course       Procedures      Old chart reviewed - admitted in Feb of this year for cellulitis. He was noted to likely have dementia. It was noted that he would perseverate on issues as he is doing here tonight. Can't keep him against his will  Perseverates about the christine cath      Pt does not like the christine cath in - according to chart yesterday, they placed it to preserve anatomy. It seems to be doing more harm than good so will DC it. Legs don't look infected  No dyspnea  Discussed with pt and cousin  Doubt cns infection, cva, tia. Pt did not want anything more. Wanted to go home. Needs diuresis. Forwarded visit to his PCP to let her know.     Labs Reviewed   URINALYSIS W/ RFLX MICROSCOPIC - Abnormal; Notable for the following:        Result Value    Protein 300 (*)     Ketone TRACE (*)     Blood LARGE (*)     All other components within normal limits   CBC WITH AUTOMATED DIFF - Abnormal; Notable for the following:     RBC 3.85 (*)     HGB 11.8 (*)     HCT 35.8 (*)     RDW 14.8 (*)     NEUTROPHILS 78 (*)     LYMPHOCYTES 9 (*)     All other components within normal limits   METABOLIC PANEL, COMPREHENSIVE - Abnormal; Notable for the following:     Glucose 113 (*)     BUN 35 (*)     Creatinine 2.33 (*)     GFR est AA 33 (*)     GFR est non-AA 28 (*)     Bilirubin, total 1.1 (*)     Albumin 3.2 (*)     A-G Ratio 0.8 (*)     All other components within normal limits   ETHYL ALCOHOL   DRUG SCREEN, URINE   TSH 3RD GENERATION   URINE MICROSCOPIC ONLY   SAMPLES BEING HELD

## 2017-07-17 NOTE — ED NOTES
I have reviewed discharge instructions with pt and his family member. VSS. Pt ambulatory out of unit.

## 2017-07-17 NOTE — PROGRESS NOTES
Nurses note states that she gave discharge instructions to patient and family. However patient found just standing in hallway. I attempted to call his wife Nicky Julien at home but the mailbox is full and is not accepting any calls. Patient has a cell phone on him and we are going to see if he can  Reach his wife on cell phone.     Taylor Gonsalez RN CRM

## 2017-08-07 ENCOUNTER — TELEPHONE (OUTPATIENT)
Dept: INTERNAL MEDICINE CLINIC | Age: 77
End: 2017-08-07

## 2017-08-07 NOTE — TELEPHONE ENCOUNTER
Patients daughter would like a call back with the name and number of a Psychiatrist.  Patient is currently at a [facility] (name daughter gave me on the phone) and patient is set to be discharged tomorrow. Please call daughter or patients wife.

## 2017-08-18 ENCOUNTER — TELEPHONE (OUTPATIENT)
Dept: INTERNAL MEDICINE CLINIC | Age: 77
End: 2017-08-18

## 2017-08-18 NOTE — TELEPHONE ENCOUNTER
Pt would like to know who would  recommend for psychiatry. Best contact number is [phone].      Message received & copied from ENVERA//[facility] with Dr. Devin Conde not until 8/31/17    copied forwarded to Dr. Jai Louis

## 2017-08-18 NOTE — TELEPHONE ENCOUNTER
Pt would like to know who would  recommend for psychiatry. Best contact number is [phone]. Message received & copied from ENVERA//[facility] with Dr. Negrita Fleming not until 8/31/17

## 2017-08-21 NOTE — TELEPHONE ENCOUNTER
Identified patient 2 identifiers verified. Patient was given contact numbers to [facility] [phone].

## 2017-08-31 ENCOUNTER — OFFICE VISIT (OUTPATIENT)
Dept: INTERNAL MEDICINE CLINIC | Age: 77
End: 2017-08-31

## 2017-08-31 VITALS
WEIGHT: 241.2 LBS | SYSTOLIC BLOOD PRESSURE: 163 MMHG | HEIGHT: 71 IN | HEART RATE: 82 BPM | OXYGEN SATURATION: 90 % | RESPIRATION RATE: 20 BRPM | BODY MASS INDEX: 33.77 KG/M2 | TEMPERATURE: 97.8 F | DIASTOLIC BLOOD PRESSURE: 70 MMHG

## 2017-08-31 DIAGNOSIS — F31.9 BIPOLAR 1 DISORDER (HCC): ICD-10-CM

## 2017-08-31 DIAGNOSIS — I35.0 AORTIC VALVE STENOSIS, UNSPECIFIED ETIOLOGY: ICD-10-CM

## 2017-08-31 DIAGNOSIS — E11.29 TYPE 2 DIABETES MELLITUS WITH OTHER DIABETIC KIDNEY COMPLICATION, WITHOUT LONG-TERM CURRENT USE OF INSULIN (HCC): Primary | ICD-10-CM

## 2017-08-31 DIAGNOSIS — E11.22 CKD STAGE 4 DUE TO TYPE 2 DIABETES MELLITUS (HCC): ICD-10-CM

## 2017-08-31 DIAGNOSIS — N18.4 CKD STAGE 4 DUE TO TYPE 2 DIABETES MELLITUS (HCC): ICD-10-CM

## 2017-08-31 DIAGNOSIS — E78.00 PURE HYPERCHOLESTEROLEMIA: Chronic | ICD-10-CM

## 2017-08-31 DIAGNOSIS — R60.0 BILATERAL EDEMA OF LOWER EXTREMITY: ICD-10-CM

## 2017-08-31 DIAGNOSIS — R06.09 DOE (DYSPNEA ON EXERTION): ICD-10-CM

## 2017-08-31 DIAGNOSIS — I10 ESSENTIAL HYPERTENSION: Chronic | ICD-10-CM

## 2017-08-31 RX ORDER — GABAPENTIN 300 MG/1
CAPSULE ORAL
Refills: 2 | COMMUNITY
Start: 2017-08-11 | End: 2017-08-31

## 2017-08-31 RX ORDER — HONEY 100 %
PASTE (ML) TOPICAL
Refills: 10 | COMMUNITY
Start: 2017-08-25

## 2017-08-31 RX ORDER — LIDOCAINE HYDROCHLORIDE 20 MG/ML
JELLY TOPICAL
Refills: 6 | COMMUNITY
Start: 2017-08-22

## 2017-08-31 RX ORDER — FUROSEMIDE 40 MG/1
TABLET ORAL
Refills: 2 | COMMUNITY
Start: 2017-08-11 | End: 2017-10-31 | Stop reason: SDUPTHER

## 2017-08-31 NOTE — MR AVS SNAPSHOT
Visit Information Date & Time Provider Department Dept. Phone Encounter #  
 8/31/2017  1:45 PM Lilli Adair, [address] [place] [address],[address] [phone] [id number] Follow-up Instructions Return in about 4 months (around 12/31/2017) for dm02 htn hld bipolar. Upcoming Health Maintenance Date Due  
 EYE EXAM RETINAL OR DILATED Q1 8/18/1952 DTaP/Tdap/Td series (1 - Tdap) 8/18/1963 ZOSTER VACCINE AGE 60> 6/18/2002 GLAUCOMA SCREENING Q2Y 8/18/2007 Pneumococcal 65+ Low/Medium Risk (1 of 2 - PCV13) 8/18/2007 MEDICARE YEARLY EXAM 8/18/2007 FOBT Q 1 YEAR AGE 50-75 5/1/2012 MICROALBUMIN Q1 12/11/2016 INFLUENZA AGE 9 TO ADULT 8/1/2017 HEMOGLOBIN A1C Q6M 8/15/2017 LIPID PANEL Q1 2/16/2018 FOOT EXAM Q1 2/17/2018 Allergies as of 8/31/2017  Review Complete On: 8/31/2017 By: Flower Bello LPN Severity Noted Reaction Type Reactions Albuterol  01/22/2014    Other (comments)  
 patient reports that albuterol gives him phlegm and makes it harder to breathe Contrast Agent [Iodine]  02/17/2017    Other (comments) \" Destroys kidneys\" Iodinated Contrast- Oral And Iv Dye  02/17/2017    Other (comments) \" Destroys kidneys\" Keflex [Cephalexin]  08/31/2017    Other (comments)  
 confusion Norvasc [Amlodipine]  02/15/2017    Shortness of Breath Pcn [Penicillins]  01/22/2014   Intolerance Shortness of Breath Just started course of PCN and hydrocodone on Monday Current Immunizations  Never Reviewed No immunizations on file. Not reviewed this visit You Were Diagnosed With   
  
 Codes Comments Type 2 diabetes mellitus with other diabetic kidney complication, without long-term current use of insulin (HCC)    -  Primary ICD-10-CM: E11.29 ICD-9-CM: 250.40 Essential hypertension     ICD-10-CM: I10 
ICD-9-CM: 401.9 Pure hypercholesterolemia     ICD-10-CM: E78.00 ICD-9-CM: 272.0 Bipolar 1 disorder (HCC)     ICD-10-CM: F31.9 ICD-9-CM: 296.7 Bilateral edema of lower extremity     ICD-10-CM: R60.0 ICD-9-CM: 782.3 LEMON (dyspnea on exertion)     ICD-10-CM: R06.09 
ICD-9-CM: 786.09 Aortic valve stenosis, unspecified etiology     ICD-10-CM: I35.0 ICD-9-CM: 424.1 Vitals BP Pulse Temp Resp Height(growth percentile) Weight(growth percentile) 163/70 (BP 1 Location: Left arm, BP Patient Position: Sitting) 82 97.8 °F (36.6 °C) (Oral) 20 5' 11\" (1.803 m) 241 lb 3.2 oz (109.4 kg) SpO2 BMI Smoking Status 90% 33.64 kg/m2 Never Smoker Vitals History BMI and BSA Data Body Mass Index Body Surface Area  
 33.64 kg/m 2 2.34 m 2 Preferred Pharmacy Pharmacy Name Phone [facility] [address] AT [address] [place] [address] Drive [phone] Your Updated Medication List  
  
   
This list is accurate as of: 8/31/17  2:37 PM.  Always use your most recent med list.  
  
  
  
  
 aspirin delayed-release 81 mg tablet Take 81 mg by mouth daily. cholecalciferol 1,000 unit tablet Commonly known as:  VITAMIN D3 Take 5,000 Units by mouth daily. COQ10 (UBIQUINOL) PO Take 60 mg by mouth daily. cyanocobalamin 1,000 mcg tablet Take 1,000 mcg by mouth daily. FOLIC ACID PO Take 400 mcg by mouth. furosemide 40 mg tablet Commonly known as:  LASIX TK 1 T PO QAM UTD  
  
 gabapentin 300 mg capsule Commonly known as:  NEURONTIN  
TK 1 C PO HS  
  
 isosorbide mononitrate ER 30 mg tablet Commonly known as:  IMDUR  
TAKE 1 TABLET BY MOUTH TWICE DAILY  
  
 lidocaine 2 % jelly Commonly known as:  XYLOCAINE  
PREM TO WOUND D UTD  
  
 linagliptin 5 mg tablet Commonly known as:  Benji Both Take 1 Tab by mouth Daily (before breakfast). lisinopril 2.5 mg tablet Commonly known as:  Julia Fess Take 1 Tab by mouth daily. Indications: DIABETIC NEPHROPATHY  MAGNESIUM GLYCINATE PO  
 Take 266 mg by mouth daily. MEDIHONEY (HONEY) 100 % topical paste Generic drug:  honey  
  
 oxyCODONE-acetaminophen 5-325 mg per tablet Commonly known as:  PERCOCET Take 1 Tab by mouth every four (4) hours as needed. Max Daily Amount: 6 Tabs. Prescriptions Sent to Pharmacy Refills  
 linagliptin (TRADJENTA) 5 mg tablet 6 Sig: Take 1 Tab by mouth Daily (before breakfast). Class: Normal  
 Pharmacy: [facility] [address] AT [address] P Ph #: [phone] Route: Oral  
  
We Performed the Following BNP [id number] CPT(R)] HEMOGLOBIN A1C WITH EAG [18908 CPT(R)] REFERRAL TO CARDIOLOGY [RKQ17 Custom] Comments:  
 [id number] [facility], [place], [facility] Follow-up Instructions Return in about 4 months (around 12/31/2017) for dm02 htn hld bipolar. Referral Information Referral ID Referred By Referred To  
  
 [id number] NASEEM RAINES [address] [employer] [address] [place] [address] Phone: [phone] Fax: [phone] Visits Status Start Date End Date 1 New Request 8/31/17 8/31/18 If your referral has a status of pending review or denied, additional information will be sent to support the outcome of this decision. Introducing [facility] & HEALTH SERVICES! Garcia Yancey introduces Sendbloom patient portal. Now you can access parts of your medical record, email your doctor's office, and request medication refills online. 1. In your internet browser, go to [email or web address] 2. Click on the First Time User? Click Here link in the Sign In box. You will see the New Member Sign Up page. 3. Enter your Sendbloom Access Code exactly as it appears below. You will not need to use this code after youve completed the sign-up process. If you do not sign up before the expiration date, you must request a new code. · StitcherAds Access Code: [email or web address] Expires: 10/14/2017  2:24 AM 
 
4. Enter the last four digits of your Social Security Number (xxxx) and Date of Birth (mm/dd/yyyy) as indicated and click Submit. You will be taken to the next sign-up page. 5. Create a StitcherAds ID. This will be your StitcherAds login ID and cannot be changed, so think of one that is secure and easy to remember. 6. Create a StitcherAds password. You can change your password at any time. 7. Enter your Password Reset Question and Answer. This can be used at a later time if you forget your password. 8. Enter your e-mail address. You will receive e-mail notification when new information is available in [address] 9. Click Sign Up. You can now view and download portions of your medical record. 10. Click the Download Summary menu link to download a portable copy of your medical information. If you have questions, please visit the Frequently Asked Questions section of the StitcherAds website. Remember, StitcherAds is NOT to be used for urgent needs. For medical emergencies, dial 911. Now available from your iPhone and Android! Please provide this summary of care documentation to your next provider. Your primary care clinician is listed as Dewayne RAINES. If you have any questions after today's visit, please call [phone].

## 2017-08-31 NOTE — PROGRESS NOTES
Chief Complaint   Patient presents with   [facility] Establish Care    Breathing Problem     Reviewed record In preparation for visit and have obtained necessary documentation    1. Have you been to the ER, urgent care clinic since your last visit? Hospitalized since your last visit? Yes Wound care center    2. Have you seen or consulted any other health care providers outside of the [address] [place] [address] since your last visit? Include any pap smears or colon screening. NO    Patient does not have advance directive on file and has received paperwork.

## 2017-08-31 NOTE — PROGRESS NOTES
HISTORY OF PRESENT ILLNESS  Jerome Ellison is a 78 y.o. male. HPI   Here to reestablish care  Has DM 2--on oral agents only-tradjenta (ckd-4)  bs 170-180  Has ckd 4-Dr Yoel Irwin but wants to see renal MD at [facility]  Has asthma and ? Asbestosis--sees Dr Alexandra Casanova , chronic LEMON  Has mild mod AS  With ef 50% by echo this year-cardiologist Dr Yifan Live  Has chronic LE edema and chronic stasis ulcer RLE--being treated at [address] [facility]  In [facility] this year--treated for RLE cellulitis vanc--keflex  Sent to psych hospital for delirium /ams--Dx bipolar d/w started on seroquel and lamotrigine but did not f/u psych MD after DC        Patient Active Problem List    Diagnosis Date Noted    Dementia 02/15/2017    Anxiety 02/15/2017    Aortic stenosis     Pulmonary hypertension ([facility]     Cellulitis 02/14/2017    CKD (chronic kidney disease) stage 4, GFR 15-29 ml/min ([facility] 02/14/2017    HTN (hypertension) 04/15/2011    DM type 2 causing renal disease ([facility] 04/15/2011    Obesity 04/15/2011    PABLO (obstructive sleep apnea) 04/15/2011    CAD (coronary artery disease) 04/15/2011    Pure hypercholesterolemia 04/15/2011     Current Outpatient Prescriptions   Medication Sig Dispense Refill    lidocaine (XYLOCAINE) 2 % jelly PREM TO WOUND D UTD  6    linagliptin (TRADJENTA) 5 mg tablet Take 1 Tab by mouth Daily (before breakfast). 30 Tab 6    isosorbide mononitrate ER (IMDUR) 30 mg tablet TAKE 1 TABLET BY MOUTH TWICE DAILY 60 Tab 0    cholecalciferol (VITAMIN D3) 1,000 unit tablet Take 5,000 Units by mouth daily.  aspirin delayed-release 81 mg tablet Take 81 mg by mouth daily.  MEDIHONEY, HONEY, 100 % topical paste   10    furosemide (LASIX) 40 mg tablet TK 1 T PO QAM UTD  2    MAGNESIUM GLYCINATE PO Take 266 mg by mouth daily.  COQ10, UBIQUINOL, PO Take 60 mg by mouth daily.  cyanocobalamin 1,000 mcg tablet Take 1,000 mcg by mouth daily.  FOLIC ACID PO Take 503 mcg by mouth. Allergies   Allergen Reactions    Albuterol Other (comments)     patient reports that albuterol gives him phlegm and makes it harder to breathe    Contrast Agent [Iodine] Other (comments)     \" Destroys kidneys\"     Iodinated Contrast- Oral And Iv Dye Other (comments)     \" Destroys kidneys\"    Keflex [Cephalexin] Other (comments)     confusion    Norvasc [Amlodipine] Shortness of Breath    Pcn [Penicillins] Shortness of Breath     Just started course of PCN and hydrocodone on Monday      Lab Results  Component Value Date/Time   Hemoglobin A1c 7.4 02/15/2017 05:10 AM   Hemoglobin A1c 8.7 12/11/2015 11:24 AM   Glucose 113 07/17/2017 04:40 AM   Glucose (POC) 152 02/18/2017 11:37 AM   Microalb/Creat ratio (ug/mg creat.) 187.5 12/11/2015 11:24 AM   LDL, calculated 96.2 02/16/2017 06:56 AM   Creatinine (POC) 4.1 05/01/2011 01:16 PM   Creatinine 2.33 07/17/2017 04:40 AM      Lab Results  Component Value Date/Time   Cholesterol, total 162 02/16/2017 06:56 AM   HDL Cholesterol 47 02/16/2017 06:56 AM   LDL, calculated 96.2 02/16/2017 06:56 AM   Triglyceride 94 02/16/2017 06:56 AM   CHOL/HDL Ratio 3.4 02/16/2017 06:56 AM     Lab Results  Component Value Date/Time   GFR est non-AA 28 07/17/2017 04:40 AM   GFRNA, POC 16 05/01/2011 01:16 PM   GFR est AA 33 07/17/2017 04:40 AM   GFRAA, POC 19 05/01/2011 01:16 PM   Creatinine 2.33 07/17/2017 04:40 AM   Creatinine (POC) 4.1 05/01/2011 01:16 PM   BUN 35 07/17/2017 04:40 AM   BUN (POC) 84 05/01/2011 01:16 PM   Sodium 140 07/17/2017 04:40 AM   Sodium (POC) 119 05/01/2011 01:16 PM   Potassium 3.5 07/17/2017 04:40 AM   Potassium (POC) 8.7 05/01/2011 01:16 PM   Chloride 105 07/17/2017 04:40 AM   Chloride (POC) 99 05/01/2011 01:16 PM   CO2 26 07/17/2017 04:40 AM   Magnesium 1.9 02/18/2017 06:49 AM   Phosphorus 3.6 02/16/2017 06:56 AM   PTH, Intact 90.3 02/16/2017 06:56 AM          Review of Systems   Constitutional: Negative. HENT: Negative. Eyes: Negative.     Respiratory: Positive for shortness of breath. Negative for cough, hemoptysis, sputum production and wheezing. Cardiovascular: Positive for chest pain. Gastrointestinal: Negative. Genitourinary: Negative. Musculoskeletal: Negative. Skin:        chornic RLE ulcer x 7 months   Neurological: Negative. Endo/Heme/Allergies: Negative. Psychiatric/Behavioral: Negative. Physical Exam   Constitutional: He appears well-developed and well-nourished. No distress. Appears stated age, obese, pleasant   HENT:   Head: Normocephalic. Mouth/Throat: Oropharynx is clear and moist.   Eyes: Pupils are equal, round, and reactive to light. Neck: Normal range of motion. No JVD present. No tracheal deviation present. No thyromegaly present. Cardiovascular: Normal rate, regular rhythm and intact distal pulses. Exam reveals no gallop and no friction rub. No murmur heard. Pulmonary/Chest: Effort normal and breath sounds normal. No respiratory distress. He has no wheezes. He has no rales. He exhibits no tenderness. Abdominal: Soft. He exhibits no distension and no mass. There is no tenderness. There is no rebound and no guarding. Musculoskeletal: He exhibits edema. 2 plus RLE edema 1 plus LLE edema. RLE ulcer covered with dressing   Lymphadenopathy:     He has no cervical adenopathy. Neurological: He is alert. Skin: Skin is warm. Psychiatric: He has a normal mood and affect. Nursing note and vitals reviewed. ASSESSMENT and PLAN  Diagnoses and all orders for this visit:    1. Type 2 diabetes mellitus with other diabetic kidney complication, without long-term current use of insulin (HCC)  -     HEMOGLOBIN A1C WITH EAG   Refilled tradjenta    2. Essential hypertension   Fair control  3. Pure hypercholesterolemia   notn on statin-goal LDL <70  4. Bipolar 1 disorder [facility])   Referral to psych MD --I gave pt and wife numbers to schedule appt   Speech is tangential  5.  Bilateral edema of lower extremity   Diuretics per renal MD   Lasix held 1-2 days but advised low sodium and to restart lasix  6. LEMON (dyspnea on exertion)  -     BNP  -     REFERRAL TO CARDIOLOGY    7. Aortic valve stenosis, unspecified etiology  -     REFERRAL TO CARDIOLOGY-Dr. Frances Baoteng    8. CKD stage 4 due to type 2 diabetes mellitus (HCC)  -     Jose Alfredo Gallo    Other orders  -     linagliptin (TRADJENTA) 5 mg tablet; Take 1 Tab by mouth Daily (before breakfast). Follow-up Disposition:  Return in about 4 months (around 12/31/2017) for dm02 htn hld bipolar.

## 2017-09-01 ENCOUNTER — TELEPHONE (OUTPATIENT)
Dept: INTERNAL MEDICINE CLINIC | Age: 77
End: 2017-09-01

## 2017-09-01 LAB
BNP SERPL-MCNC: 947.5 PG/ML (ref 0–100)
EST. AVERAGE GLUCOSE BLD GHB EST-MCNC: 131 MG/DL
HBA1C MFR BLD: 6.2 % (ref 4.8–5.6)

## 2017-09-05 ENCOUNTER — TELEPHONE (OUTPATIENT)
Dept: INTERNAL MEDICINE CLINIC | Age: 77
End: 2017-09-05

## 2017-09-05 NOTE — TELEPHONE ENCOUNTER
----- Message from Trina Guadarrama sent at 9/1/2017  5:42 PM EDT -----  Regarding: Dr. Elliot Granados  Pt is requesting clarification on medication to take regarding eliminating fluid for poss Congestive Heart failure or kidney issues. Pt best contact [phone].       Copied/pasted from [facility]

## 2017-09-05 NOTE — TELEPHONE ENCOUNTER
Spoke with patient after 2 patient identifiers being note and he advised that he is having issues with swelling, christine placed because he is having issues going to bathroom, ED stated per patient that it is due to CHF, I advised to call cardiologist that he was referred to on 8/31/17, I gave patient the number. Also asked patient iof he was taking lasix as ordered as he explained that his feet are swollen at his time, he replied \"I try to take it like I am supposed to\". I advised that he should call cardio MD and that if he could not get in soon call me back for an appt with Dr. Vahe Kaiser, and to start taking his lasix like he and Dr. Vahe Kaiser had discussed.

## 2017-10-31 ENCOUNTER — OFFICE VISIT (OUTPATIENT)
Dept: INTERNAL MEDICINE CLINIC | Age: 77
End: 2017-10-31

## 2017-10-31 VITALS
SYSTOLIC BLOOD PRESSURE: 150 MMHG | OXYGEN SATURATION: 99 % | WEIGHT: 241 LBS | TEMPERATURE: 97.4 F | BODY MASS INDEX: 33.61 KG/M2 | HEART RATE: 81 BPM | DIASTOLIC BLOOD PRESSURE: 73 MMHG

## 2017-10-31 DIAGNOSIS — N47.2 PARAPHIMOSIS: ICD-10-CM

## 2017-10-31 DIAGNOSIS — R60.0 LEG EDEMA: ICD-10-CM

## 2017-10-31 DIAGNOSIS — F02.80 ALZHEIMER'S DEMENTIA WITHOUT BEHAVIORAL DISTURBANCE, UNSPECIFIED TIMING OF DEMENTIA ONSET: Primary | ICD-10-CM

## 2017-10-31 DIAGNOSIS — I50.32 DIASTOLIC CHF, CHRONIC (HCC): ICD-10-CM

## 2017-10-31 DIAGNOSIS — G30.9 ALZHEIMER'S DEMENTIA WITHOUT BEHAVIORAL DISTURBANCE, UNSPECIFIED TIMING OF DEMENTIA ONSET: Primary | ICD-10-CM

## 2017-10-31 DIAGNOSIS — N18.4 CKD (CHRONIC KIDNEY DISEASE) STAGE 4, GFR 15-29 ML/MIN (HCC): Chronic | ICD-10-CM

## 2017-10-31 RX ORDER — FUROSEMIDE 40 MG/1
40 TABLET ORAL 2 TIMES DAILY
Qty: 60 TAB | Refills: 11 | Status: SHIPPED | OUTPATIENT
Start: 2017-10-31

## 2017-10-31 RX ORDER — CARVEDILOL 12.5 MG/1
12.5 TABLET ORAL 2 TIMES DAILY
Qty: 60 TAB | Refills: 11 | Status: SHIPPED | OUTPATIENT
Start: 2017-10-31 | End: 2018-10-26 | Stop reason: CLARIF

## 2017-10-31 RX ORDER — SPIRONOLACTONE 25 MG/1
25 TABLET ORAL DAILY
Qty: 30 TAB | Refills: 11 | Status: SHIPPED | OUTPATIENT
Start: 2017-10-31 | End: 2018-10-26

## 2017-10-31 RX ORDER — OLANZAPINE 5 MG/1
TABLET ORAL
COMMUNITY

## 2017-10-31 RX ORDER — CARVEDILOL 12.5 MG/1
TABLET ORAL 2 TIMES DAILY WITH MEALS
COMMUNITY
End: 2017-10-31 | Stop reason: SDUPTHER

## 2017-10-31 RX ORDER — SPIRONOLACTONE 25 MG/1
TABLET ORAL DAILY
COMMUNITY
End: 2017-10-31 | Stop reason: SDUPTHER

## 2017-10-31 RX ORDER — ISOSORBIDE MONONITRATE 30 MG/1
TABLET, EXTENDED RELEASE ORAL
Qty: 60 TAB | Refills: 11 | Status: SHIPPED | OUTPATIENT
Start: 2017-10-31 | End: 2018-10-26 | Stop reason: SDUPTHER

## 2017-10-31 RX ORDER — ATORVASTATIN CALCIUM 40 MG/1
TABLET, FILM COATED ORAL DAILY
COMMUNITY
End: 2018-10-26

## 2017-10-31 NOTE — MR AVS SNAPSHOT
Visit Information Date & Time Provider Department Dept. Phone Encounter #  
 10/31/2017  4:00 PM Abby Solisse, [place] [facility] [phone] [id number] Your Appointments 12/12/2017  2:45 PM  
ROUTINE CARE with Abby Lowery, [place] [address]) Appt Note: 4 month follow up  
 [facility] [address] [id number]  
[address] [place] [address] [address] [place] [address] Upcoming Health Maintenance Date Due  
 EYE EXAM RETINAL OR DILATED Q1 8/18/1952 DTaP/Tdap/Td series (1 - Tdap) 8/18/1963 ZOSTER VACCINE AGE 60> 6/18/2002 GLAUCOMA SCREENING Q2Y 8/18/2007 Pneumococcal 65+ Low/Medium Risk (1 of 2 - PCV13) 8/18/2007 MEDICARE YEARLY EXAM 8/18/2007 MICROALBUMIN Q1 12/11/2016 INFLUENZA AGE 9 TO ADULT 8/1/2017 LIPID PANEL Q1 2/16/2018 FOOT EXAM Q1 2/17/2018 HEMOGLOBIN A1C Q6M 2/28/2018 Allergies as of 10/31/2017  Review Complete On: 10/31/2017 By: Abby Lowery MD  
  
 Severity Noted Reaction Type Reactions Albuterol  01/22/2014    Other (comments)  
 patient reports that albuterol gives him phlegm and makes it harder to breathe Contrast Agent [Iodine]  02/17/2017    Other (comments) \" Destroys kidneys\" Iodinated Contrast- Oral And Iv Dye  02/17/2017    Other (comments) \" Destroys kidneys\" Keflex [Cephalexin]  08/31/2017    Other (comments)  
 confusion Norvasc [Amlodipine]  02/15/2017    Shortness of Breath Pcn [Penicillins]  01/22/2014   Intolerance Shortness of Breath Just started course of PCN and hydrocodone on Monday Current Immunizations  Never Reviewed No immunizations on file. Not reviewed this visit You Were Diagnosed With   
  
 Codes Comments Alzheimer's dementia without behavioral disturbance, unspecified timing of dementia onset    -  Primary ICD-10-CM: G30.9, F02.80 ICD-9-CM: 331.0, 294.10 Leg edema     ICD-10-CM: R60.0 ICD-9-CM: 735. 3 Paraphimosis     ICD-10-CM: N47.2 ICD-9-CM: 432 CKD (chronic kidney disease) stage 4, GFR 15-29 ml/min ([facility])     ICD-10-CM: N18.4 ICD-9-CM: 585.4 Diastolic CHF, chronic ([facility])     ICD-10-CM: I50.32 
ICD-9-CM: 428.32, 428.0 Vitals BP Pulse Temp Weight(growth percentile) SpO2 BMI  
 150/73 (BP 1 Location: Right arm, BP Patient Position: Sitting) 81 97.4 °F (36.3 °C) (Oral) 241 lb (109.3 kg) 99% 33.61 kg/m2 Smoking Status Never Smoker BMI and BSA Data Body Mass Index Body Surface Area  
 33.61 kg/m 2 2.34 m 2 Preferred Pharmacy Pharmacy Name Phone [facility] [address] AT [address] [place] [address] Drive [phone] Your Updated Medication List  
  
   
This list is accurate as of: 10/31/17  5:04 PM.  Always use your most recent med list.  
  
  
  
  
 apixaban 2.5 mg tablet Commonly known as:  Virgel Susan Take 2.5 mg by mouth two (2) times a day. aspirin delayed-release 81 mg tablet Take 81 mg by mouth daily. atorvastatin 40 mg tablet Commonly known as:  LIPITOR Take  by mouth daily. carvedilol 12.5 mg tablet Commonly known as:  Sean Been Take 1 Tab by mouth two (2) times a day. cholecalciferol 1,000 unit tablet Commonly known as:  VITAMIN D3 Take 5,000 Units by mouth daily. COQ10 (UBIQUINOL) PO Take 60 mg by mouth daily. cyanocobalamin 1,000 mcg tablet Take 1,000 mcg by mouth daily. FOLIC ACID PO Take 400 mcg by mouth. furosemide 40 mg tablet Commonly known as:  LASIX Take 1 Tab by mouth two (2) times a day. glucose blood VI test strips strip Commonly known as:  ASCENSIA AUTODISC VI, ONE TOUCH ULTRA TEST VI Check fsbs every day  
  
 isosorbide mononitrate ER 30 mg tablet Commonly known as:  IMDUR  
TAKE 1 TABLET BY MOUTH TWICE DAILY Lactobacillus acidophilus Cap Commonly known as:  BACID Take 2 Caps by mouth two (2) times a day. lidocaine 2 % jelly Commonly known as:  XYLOCAINE  
PREM TO WOUND D UTD MAGNESIUM GLYCINATE PO Take 266 mg by mouth daily. MEDIHONEY (HONEY) 100 % topical paste Generic drug:  honey  
  
 spironolactone 25 mg tablet Commonly known as:  ALDACTONE Take 1 Tab by mouth daily. ZyPREXA 5 mg tablet Generic drug:  OLANZapine Take  by mouth nightly. Prescriptions Sent to Pharmacy Refills  
 carvedilol (COREG) 12.5 mg tablet 11 Sig: Take 1 Tab by mouth two (2) times a day. Class: Normal  
 Pharmacy: [facility] [address] AT [address] [place] [address] P Ph #: [phone] Route: Oral  
 isosorbide mononitrate ER (IMDUR) 30 mg tablet 11 Sig: TAKE 1 TABLET BY MOUTH TWICE DAILY Class: Normal  
 Pharmacy: [facility] [address] AT [address] [place] [address] Drive Ph #: [phone]  
 spironolactone (ALDACTONE) 25 mg tablet 11 Sig: Take 1 Tab by mouth daily. Class: Normal  
 Pharmacy: [facility] [address] AT [address] [place] [address] P Ph #: [phone] Route: Oral  
 furosemide (LASIX) 40 mg tablet 11 Sig: Take 1 Tab by mouth two (2) times a day. Class: Normal  
 Pharmacy: [facility] [address] AT [address] [place] [address] P Ph #: [phone] Route: Oral  
 glucose blood VI test strips (ASCENSIA AUTODISC VI, ONE TOUCH ULTRA TEST VI) strip 11 Sig: Check fsbs every day Class: Normal  
 Pharmacy: [facility] [address] AT [address] [place] [address] P Ph #: [phone] We Performed the Following REFERRAL TO UROLOGY [NLI932 Custom] Referral Information Referral ID Referred By Referred To [id number] [facility]rology [facility] [address] [place], [address] Visits Status Start Date End Date 1 New Request 10/31/17 10/31/18 If your referral has a status of pending review or denied, additional information will be sent to support the outcome of this decision. Introducing \[facility]\"" & HEALTH SERVICES! Elizabeth Bryan introduces MyWobile patient portal. Now you can access parts of your medical record, email your doctor's office, and request medication refills online. 1. In your internet browser, go to [email or web address] 2. Click on the First Time User? Click Here link in the Sign In box. You will see the New Member Sign Up page. 3. Enter your MyWobile Access Code exactly as it appears below. You will not need to use this code after youve completed the sign-up process. If you do not sign up before the expiration date, you must request a new code. · MyWobile Access Code: [email or web address] Expires: 1/29/2018  4:50 PM 
 
4. Enter the last four digits of your Social Security Number (xxxx) and Date of Birth (mm/dd/yyyy) as indicated and click Submit. You will be taken to the next sign-up page. 5. Create a MyWobile ID. This will be your MyWobile login ID and cannot be changed, so think of one that is secure and easy to remember. 6. Create a MyWobile password. You can change your password at any time. 7. Enter your Password Reset Question and Answer. This can be used at a later time if you forget your password. 8. Enter your e-mail address. You will receive e-mail notification when new information is available in [address] 9. Click Sign Up. You can now view and download portions of your medical record. 10. Click the Download Summary menu link to download a portable copy of your medical information.  
 
If you have questions, please visit the Frequently Asked Questions section of the Xobni. Remember, [employer] is NOT to be used for urgent needs. For medical emergencies, dial 911. Now available from your iPhone and Android! Please provide this summary of care documentation to your next provider. Your primary care clinician is listed as Néstor RAINES. If you have any questions after today's visit, please call [phone].

## 2017-10-31 NOTE — PROGRESS NOTES
HISTORY OF PRESENT ILLNESS  Tamanna Torres is a 78 y.o. male. HPI   Pt here with wife in hospital f/u from [facility]  Discharged to home 10/27/17--d/c papers available but no summary  Was in [facility] rehab d/t agitation problems  Became more agitated and sent to [facility]--dx CAP and UTI and dementia with delusions  Treated and started on on zyprexa 5 mg hs--saw Dr Luisa cuevas MD this week and this med will be continued  Pt at home now  Started on coreg and aldactone for chf  Also started on eliquis 2.5 mg bid for ? Reason. Pt stopped d/t bruising of right arm  [facility] healed this week  Has paraphimosis and had indwelling christine that was removed while inpatient at [facility]. C/o unable to control urine now with incontinence  Has been told he has a very open urethral opening  Patient Active Problem List    Diagnosis Date Noted    Dementia 02/15/2017    Anxiety 02/15/2017    Aortic stenosis     Pulmonary hypertension     Cellulitis 02/14/2017    CKD (chronic kidney disease) stage 4, GFR 15-29 ml/min ([facility] 02/14/2017    HTN (hypertension) 04/15/2011    DM type 2 causing renal disease ([facility] 04/15/2011    Obesity 04/15/2011    PABLO (obstructive sleep apnea) 04/15/2011    CAD (coronary artery disease) 04/15/2011    Pure hypercholesterolemia 04/15/2011     Current Outpatient Prescriptions   Medication Sig Dispense Refill    atorvastatin (LIPITOR) 40 mg tablet Take  by mouth daily.  OLANZapine (ZYPREXA) 5 mg tablet Take  by mouth nightly.  Lactobacillus acidophilus (BACID) cap Take 2 Caps by mouth two (2) times a day.  carvedilol (COREG) 12.5 mg tablet Take 1 Tab by mouth two (2) times a day. 60 Tab 11    isosorbide mononitrate ER (IMDUR) 30 mg tablet TAKE 1 TABLET BY MOUTH TWICE DAILY 60 Tab 11    spironolactone (ALDACTONE) 25 mg tablet Take 1 Tab by mouth daily. 30 Tab 11    furosemide (LASIX) 40 mg tablet Take 1 Tab by mouth two (2) times a day.  60 Tab 11  glucose blood VI test strips (ASCENSIA AUTODISC VI, ONE TOUCH ULTRA TEST VI) strip Check fsbs every day 50 Strip 11    aspirin delayed-release 81 mg tablet Take 81 mg by mouth daily.  apixaban (ELIQUIS) 2.5 mg tablet Take 2.5 mg by mouth two (2) times a day.  MEDIHONEY, HONEY, 100 % topical paste   10    lidocaine (XYLOCAINE) 2 % jelly PREM TO WOUND D UTD  6    cholecalciferol (VITAMIN D3) 1,000 unit tablet Take 5,000 Units by mouth daily.  MAGNESIUM GLYCINATE PO Take 266 mg by mouth daily.  COQ10, UBIQUINOL, PO Take 60 mg by mouth daily.  cyanocobalamin 1,000 mcg tablet Take 1,000 mcg by mouth daily.  FOLIC ACID PO Take 459 mcg by mouth.        Allergies   Allergen Reactions    Albuterol Other (comments)     patient reports that albuterol gives him phlegm and makes it harder to breathe    Contrast Agent [Iodine] Other (comments)     \" Destroys kidneys\"     Iodinated Contrast- Oral And Iv Dye Other (comments)     \" Destroys kidneys\"    Keflex [Cephalexin] Other (comments)     confusion    Norvasc [Amlodipine] Shortness of Breath    Pcn [Penicillins] Shortness of Breath     Just started course of PCN and hydrocodone on Monday     Social History   Substance Use Topics    Smoking status: Never Smoker    Smokeless tobacco: Never Used    Alcohol use 0.0 oz/week     0 Standard drinks or equivalent per week      Comment: Rare      Lab Results  Component Value Date/Time   WBC 8.4 07/17/2017 04:40 AM   HGB 11.8 07/17/2017 04:40 AM   Hemoglobin (POC) 11.9 05/01/2011 01:16 PM   HCT 35.8 07/17/2017 04:40 AM   Hematocrit (POC) 35 05/01/2011 01:16 PM   PLATELET 403 43/00/7063 04:40 AM   MCV 93.0 07/17/2017 04:40 AM     Lab Results  Component Value Date/Time   GFR est non-AA 28 07/17/2017 04:40 AM   GFRNA, POC 16 05/01/2011 01:16 PM   GFR est AA 33 07/17/2017 04:40 AM   GFRAA, POC 19 05/01/2011 01:16 PM   Creatinine 2.33 07/17/2017 04:40 AM   Creatinine (POC) 4.1 05/01/2011 01:16 PM BUN 35 07/17/2017 04:40 AM   BUN (POC) 84 05/01/2011 01:16 PM   Sodium 140 07/17/2017 04:40 AM   Sodium (POC) 119 05/01/2011 01:16 PM   Potassium 3.5 07/17/2017 04:40 AM   Potassium (POC) 8.7 05/01/2011 01:16 PM   Chloride 105 07/17/2017 04:40 AM   Chloride (POC) 99 05/01/2011 01:16 PM   CO2 26 07/17/2017 04:40 AM   Magnesium 1.9 02/18/2017 06:49 AM   Phosphorus 3.6 02/16/2017 06:56 AM   PTH, Intact 90.3 02/16/2017 06:56 AM        ROS    Physical Exam   Constitutional: He appears well-developed and well-nourished. No distress. Appears stated age, sitting in [address]. NAD, verbose   HENT:   Head: Normocephalic. Cardiovascular: Normal rate, regular rhythm and normal heart sounds. Exam reveals no gallop and no friction rub. No murmur heard. Pulmonary/Chest: Effort normal and breath sounds normal. No respiratory distress. He has no wheezes. He has no rales. He exhibits no tenderness. Abdominal: Soft. Musculoskeletal: He exhibits edema. 1-2 plus b/l LE edema. No ulceration   Neurological: He is alert. Psychiatric: He has a normal mood and affect. Nursing note and vitals reviewed. ASSESSMENT and PLAN  Diagnoses and all orders for this visit:    1. Alzheimer's dementia without behavioral disturbance, unspecified timing of dementia onset   Continue zyprexa and f/u Dr Cayden Barroso. Not as agitated since at home  2. Leg edema   Continue to elevate and use support stockings  3. Paraphimosis  -     REFERRAL TO UROLOGY   ? Surgery or reinsert christine  4. CKD (chronic kidney disease) stage 4, GFR 15-29 ml/min ([facility])   Had visit with Dr Luli Franco this week per pt  5. Diastolic CHF, chronic (HCC)   Refilled aldactone , lasix and coreg   Advised f/u Dr Mindy Helm    6. Dm-2   Not taking tradjenta   fsbs every day    7. NOAC   Not taking eliquis   Will ask NN to get records from last [facility] hospitalization  Other orders  -     carvedilol (COREG) 12.5 mg tablet; Take 1 Tab by mouth two (2) times a day.   -     isosorbide mononitrate ER (IMDUR) 30 mg tablet; TAKE 1 TABLET BY MOUTH TWICE DAILY  -     spironolactone (ALDACTONE) 25 mg tablet; Take 1 Tab by mouth daily. -     furosemide (LASIX) 40 mg tablet; Take 1 Tab by mouth two (2) times a day. -     glucose blood VI test strips (ASCENSIA AUTODISC VI, ONE TOUCH ULTRA TEST VI) strip;  Check fsbs every day      Follow-up Disposition: Not on File

## 2017-11-01 ENCOUNTER — TELEPHONE (OUTPATIENT)
Dept: INTERNAL MEDICINE CLINIC | Age: 77
End: 2017-11-01

## 2017-11-01 NOTE — TELEPHONE ENCOUNTER
11/1/17 Left message for Mr. Juan Wallace to call concerning   His visit yesterday. Dr. Miladis Del Real received his hospital notes and according the note it's not requiring him to continue Eliquis.

## 2017-11-03 ENCOUNTER — TELEPHONE (OUTPATIENT)
Dept: INTERNAL MEDICINE CLINIC | Age: 77
End: 2017-11-03

## 2017-11-03 NOTE — TELEPHONE ENCOUNTER
11/3/17 Patient return phone call concerning the message   I had left. Dr. Eladio Bird wanted him to stay off the eliquis. Patient   Understood and agreed.

## 2017-11-20 ENCOUNTER — TELEPHONE (OUTPATIENT)
Dept: INTERNAL MEDICINE CLINIC | Age: 77
End: 2017-11-20

## 2017-11-20 ENCOUNTER — OFFICE VISIT (OUTPATIENT)
Dept: CARDIOLOGY CLINIC | Age: 77
End: 2017-11-20

## 2017-11-20 VITALS
OXYGEN SATURATION: 94 % | BODY MASS INDEX: 36.55 KG/M2 | RESPIRATION RATE: 16 BRPM | HEART RATE: 80 BPM | SYSTOLIC BLOOD PRESSURE: 120 MMHG | HEIGHT: 71 IN | DIASTOLIC BLOOD PRESSURE: 70 MMHG | WEIGHT: 261.1 LBS

## 2017-11-20 DIAGNOSIS — I25.10 CORONARY ARTERY DISEASE INVOLVING NATIVE CORONARY ARTERY OF NATIVE HEART WITHOUT ANGINA PECTORIS: Chronic | ICD-10-CM

## 2017-11-20 DIAGNOSIS — I10 ESSENTIAL HYPERTENSION: Chronic | ICD-10-CM

## 2017-11-20 DIAGNOSIS — E11.21 TYPE 2 DIABETES MELLITUS WITH DIABETIC NEPHROPATHY, WITHOUT LONG-TERM CURRENT USE OF INSULIN (HCC): ICD-10-CM

## 2017-11-20 DIAGNOSIS — N18.4 CKD (CHRONIC KIDNEY DISEASE) STAGE 4, GFR 15-29 ML/MIN (HCC): Chronic | ICD-10-CM

## 2017-11-20 DIAGNOSIS — I50.32 DIASTOLIC CHF, CHRONIC (HCC): Primary | ICD-10-CM

## 2017-11-20 NOTE — PROGRESS NOTES
1. Have you been to the ER, urgent care clinic since your last visit? Hospitalized since your last visit? Yes When: 7/2017    2. Have you seen or consulted any other health care providers outside of the [address] [place] [address] since your last visit? Include any pap smears or colon screening. Yes When: [address]      Patient has been under wound care and in [facility] . He didn't bring his medications.

## 2017-11-20 NOTE — PROGRESS NOTES
NAME:  Brock Nelson. :   1940   MRN:   [id number]   PCP:  Liborio Abreu MD           Subjective: The patient is a 78y.o. year old male  who returns for a routine follow-up. Since the last visit, patient reports no change in exercise tolerance, chest pain, edema, medication intolerance, palpitations, shortness of breath, PND/orthopnea wheezing, sputum, syncope, dizziness or light headedness. Seen 18 months ago. Was at [facility] recently for AMS. Last seen by me . Seen by Dr. Eleazar Oglesby . Echo showed EF 45-50%. Has chronic leg wounds. Stopped lasix as he felt it was not helping him. Eating lot of salt as he misunderstood the instructions. Has gained 20 lbs. Past Medical History:   Diagnosis Date    Aortic stenosis     CAD (coronary artery disease)     CKD (chronic kidney disease) stage 4, GFR 15-29 ml/min ([facility]) 2017    Diabetes ([facility] [address]     Hypertension     Pulmonary hypertension         ICD-10-CM [id number]    1. Diastolic CHF, chronic ([facility]) I50.32 428.32      428.0    2. Essential hypertension I10 401.9 AMB POC EKG ROUTINE W/ 12 LEADS, INTER & REP   3. Coronary artery disease involving native coronary artery of native heart without angina pectoris I25.10 414.01    4. CKD (chronic kidney disease) stage 4, GFR 15-29 ml/min ([facility]) N18.4 585.4    5. Type 2 diabetes mellitus with diabetic nephropathy, without long-term current use of insulin ([facility]) E11.21 250.40      583.81       Social History   Substance Use Topics    Smoking status: Never Smoker    Smokeless tobacco: Never Used    Alcohol use 0.0 oz/week     0 Standard drinks or equivalent per week      Comment: Rare      Family History   Problem Relation Age of Onset    Diabetes Father         Review of Systems  General: Pt denies excessive weight gain or loss. Pt is able to conduct ADL's  HEENT: Denies blurred vision, headaches, epistaxis and difficulty swallowing.   Respiratory: Denies shortness of breath, LEMON, wheezing or stridor. Cardiovascular: Denies precordial pain, palpitations, edema or PND  Gastrointestinal: Denies poor appetite, indigestion, abdominal pain or blood in stool  Musculoskeletal: Denies pain or swelling from muscles or joints  Neurologic: Denies tremor, paresthesias, or sensory motor disturbance  Skin: Denies rash, itching or texture change. Objective:       Vitals:    11/20/17 1417 11/20/17 1418   BP: 130/70 120/70   Pulse: 80    Resp: 16    SpO2: 94%    Weight: 261 lb 1.6 oz (118.4 kg)    Height: 5' 11\" (1.803 m)     Body mass index is 36.42 kg/(m^2). General PE  Mental Status - Alert. General Appearance - Not in acute distress. Chest and Lung Exam   Inspection: Accessory muscles - No use of accessory muscles in breathing. Auscultation:   Breath sounds: - Normal.    Cardiovascular   Inspection: Jugular vein - Bilateral - Inspection Normal.  Palpation/Percussion:   Apical Impulse: - Normal.  Auscultation: Rhythm - Regular. Heart Sounds - S1 WNL and S2 WNL. No S3 or S4. Murmurs & Other Heart Sounds: Auscultation of the heart reveals - No Murmurs. Peripheral Vascular   Upper Extremity: Inspection - Bilateral - No Cyanotic nailbeds or Digital clubbing. Lower Extremity:   Palpation: Edema - Bilateral - No edema. Data Review:     EKG -EKG: normal EKG, normal sinus rhythm, unchanged from previous tracings. Medications reviewed  Current Outpatient Prescriptions   Medication Sig    Lactobacillus acidophilus (BACID) cap Take 1 Cap by mouth daily.  carvedilol (COREG) 12.5 mg tablet Take 1 Tab by mouth two (2) times a day.  isosorbide mononitrate ER (IMDUR) 30 mg tablet TAKE 1 TABLET BY MOUTH TWICE DAILY    spironolactone (ALDACTONE) 25 mg tablet Take 1 Tab by mouth daily.  glucose blood VI test strips (ASCENSIA AUTODISC VI, ONE TOUCH ULTRA TEST VI) strip Check fsbs every day    MAGNESIUM GLYCINATE PO Take 266 mg by mouth daily.     aspirin delayed-release 81 mg tablet Take 81 mg by mouth daily.  apixaban (ELIQUIS) 2.5 mg tablet Take 2.5 mg by mouth two (2) times a day.  atorvastatin (LIPITOR) 40 mg tablet Take  by mouth daily.  OLANZapine (ZYPREXA) 5 mg tablet Take  by mouth nightly.  furosemide (LASIX) 40 mg tablet Take 1 Tab by mouth two (2) times a day. (Patient not taking: Reported on 11/20/2017)    MEDIHONEY, HONEY, 100 % topical paste     lidocaine (XYLOCAINE) 2 % jelly PREM TO WOUND D UTD    cholecalciferol (VITAMIN D3) 1,000 unit tablet Take 5,000 Units by mouth daily.  COQ10, UBIQUINOL, PO Take 60 mg by mouth daily.  cyanocobalamin 1,000 mcg tablet Take 1,000 mcg by mouth daily.  FOLIC ACID PO Take 098 mcg by mouth. No current facility-administered medications for this visit. Assessment:       ICD-10-CM [id number]    1. Diastolic CHF, chronic ([facility]) I50.32 428.32      428.0    2. Essential hypertension I10 401.9 AMB POC EKG ROUTINE W/ 12 LEADS, INTER & REP   3. Coronary artery disease involving native coronary artery of native heart without angina pectoris I25.10 414.01    4. CKD (chronic kidney disease) stage 4, GFR 15-29 ml/min ([facility]) N18.4 585.4    5. Type 2 diabetes mellitus with diabetic nephropathy, without long-term current use of insulin ([facility]) E11.21 250.40      583.81         Plan:     Patient presents doing well and stable from cardiac standpoint. Has gained 20 lbs. Will restart lasix 40 mg BID. Advised to restrict salt intake. Obtain records from [address] [place] [address]. Repeat echo if none performed. Labs next visit. Continue current care and follow up in 2 weeks. Discussed not to change medical treatment without consulting his physicians.     Saúl Orozco MD

## 2017-11-20 NOTE — MR AVS SNAPSHOT
Visit Information Date & Time Provider Department Dept. Phone Encounter #  
 11/20/2017  1:15 PM Lia Madison MD [facility] [phone] [id number] Your Appointments 12/12/2017  2:45 PM  
ROUTINE CARE with Saurabh Corona, [address] [place] [address] [facility]) Appt Note: 4 month follow up  
 [address] [address]  
[address] [facility] [address] [place] [address] Upcoming Health Maintenance Date Due  
 EYE EXAM RETINAL OR DILATED Q1 8/18/1952 DTaP/Tdap/Td series (1 - Tdap) 8/18/1963 ZOSTER VACCINE AGE 60> 6/18/2002 GLAUCOMA SCREENING Q2Y 8/18/2007 Pneumococcal 65+ Low/Medium Risk (1 of 2 - PCV13) 8/18/2007 MEDICARE YEARLY EXAM 8/18/2007 MICROALBUMIN Q1 12/11/2016 Influenza Age 5 to Adult 8/1/2017 LIPID PANEL Q1 2/16/2018 FOOT EXAM Q1 2/17/2018 HEMOGLOBIN A1C Q6M 2/28/2018 Allergies as of 11/20/2017  Review Complete On: 11/20/2017 By: Ajith Lane LPN Severity Noted Reaction Type Reactions Albuterol  01/22/2014    Other (comments)  
 patient reports that albuterol gives him phlegm and makes it harder to breathe Contrast Agent [Iodine]  02/17/2017    Other (comments) \" Destroys kidneys\" Iodinated Contrast- Oral And Iv Dye  02/17/2017    Other (comments) \" Destroys kidneys\" Keflex [Cephalexin]  08/31/2017    Other (comments)  
 confusion Norvasc [Amlodipine]  02/15/2017    Shortness of Breath Pcn [Penicillins]  01/22/2014   Intolerance Shortness of Breath Just started course of PCN and hydrocodone on Monday Current Immunizations  Never Reviewed No immunizations on file. Not reviewed this visit You Were Diagnosed With   
  
 Codes Comments Essential hypertension    -  Primary ICD-10-CM: I10 
ICD-9-CM: 401.9 Vitals BP Pulse Resp Height(growth percentile) Weight(growth percentile) SpO2  
 120/70 (BP 1 Location: Right arm, BP Patient Position: Sitting) 80 16 5' 11\" (1.803 m) 261 lb 1.6 oz (118.4 kg) 94% BMI Smoking Status 36.42 kg/m2 Never Smoker Vitals History BMI and BSA Data Body Mass Index Body Surface Area  
 36.42 kg/m 2 2.44 m 2 Preferred Pharmacy Pharmacy Name Phone [facility] [address] AT [address] [place] [address] Drive [phone] Your Updated Medication List  
  
   
This list is accurate as of: 11/20/17  2:46 PM.  Always use your most recent med list.  
  
  
  
  
 apixaban 2.5 mg tablet Commonly known as:  Taj Croissant Take 2.5 mg by mouth two (2) times a day. aspirin delayed-release 81 mg tablet Take 81 mg by mouth daily. atorvastatin 40 mg tablet Commonly known as:  LIPITOR Take  by mouth daily. carvedilol 12.5 mg tablet Commonly known as:  Ana Dome Take 1 Tab by mouth two (2) times a day. cholecalciferol 1,000 unit tablet Commonly known as:  VITAMIN D3 Take 5,000 Units by mouth daily. COQ10 (UBIQUINOL) PO Take 60 mg by mouth daily. cyanocobalamin 1,000 mcg tablet Take 1,000 mcg by mouth daily. FOLIC ACID PO Take 400 mcg by mouth. furosemide 40 mg tablet Commonly known as:  LASIX Take 1 Tab by mouth two (2) times a day. glucose blood VI test strips strip Commonly known as:  ASCENSIA AUTODISC VI, ONE TOUCH ULTRA TEST VI Check fsbs every day  
  
 isosorbide mononitrate ER 30 mg tablet Commonly known as:  IMDUR  
TAKE 1 TABLET BY MOUTH TWICE DAILY Lactobacillus acidophilus Cap Commonly known as:  BACID Take 1 Cap by mouth daily. lidocaine 2 % jelly Commonly known as:  XYLOCAINE  
PREM TO WOUND D UTD MAGNESIUM GLYCINATE PO Take 266 mg by mouth daily. MEDIHONEY (HONEY) 100 % topical paste Generic drug:  honey  
  
 spironolactone 25 mg tablet Commonly known as:  ALDACTONE Take 1 Tab by mouth daily. ZyPREXA 5 mg tablet Generic drug:  OLANZapine Take  by mouth nightly. We Performed the Following AMB POC EKG ROUTINE W/ 12 LEADS, INTER & REP [88652 CPT(R)] Introducing [facility] SERVICES! Susanne Bell introduces [employer] patient portal. Now you can access parts of your medical record, email your doctor's office, and request medication refills online. 1. In your internet browser, go to [email or web address] 2. Click on the First Time User? Click Here link in the Sign In box. You will see the New Member Sign Up page. 3. Enter your [employer] Access Code exactly as it appears below. You will not need to use this code after youve completed the sign-up process. If you do not sign up before the expiration date, you must request a new code. · [employer] Access Code: [email or web address] Expires: 1/29/2018  3:50 PM 
 
4. Enter the last four digits of your Social Security Number (xxxx) and Date of Birth (mm/dd/yyyy) as indicated and click Submit. You will be taken to the next sign-up page. 5. Create a [employer] ID. This will be your [employer] login ID and cannot be changed, so think of one that is secure and easy to remember. 6. Create a [employer] password. You can change your password at any time. 7. Enter your Password Reset Question and Answer. This can be used at a later time if you forget your password. 8. Enter your e-mail address. You will receive e-mail notification when new information is available in [address] 9. Click Sign Up. You can now view and download portions of your medical record. 10. Click the Download Summary menu link to download a portable copy of your medical information. If you have questions, please visit the Frequently Asked Questions section of the [employer] website.  Remember, [employer] is NOT to be used for urgent needs. For medical emergencies, dial 911. Now available from your iPhone and Android! Please provide this summary of care documentation to your next provider. Your primary care clinician is listed as Frances RAINES. If you have any questions after today's visit, please call [phone].

## 2017-11-20 NOTE — TELEPHONE ENCOUNTER
Pt was transferred from the phone team with a question about what instructions he was given today at Dr. Bertha Rust office as he does not have them. Advised pt to call Dr. Mauricio Rivera. Pt stated he has and can't get through to them. Advised pt per this excerpt note from 11/20/17:  Plan:      Patient presents doing well and stable from cardiac standpoint. Has gained 20 lbs. Will restart lasix 40 mg BID. Advised to restrict salt intake. Obtain records from [address] [place] [address]. Repeat echo if none performed. Labs next visit. Continue current care and follow up in 2 weeks. Discussed not to change medical treatment without consulting his physicians.     Jeronimo Welsh MD    Advised pt that Dr. Elena Suggs sent furosemide (Lasix) to his pharmacy on 10/31/17. Pt was confused about dates on his HM, stating he never had an eye exam in 1950 when he was 12. Explained to pt that until Dr. Elena Suggs puts the actual date of his last exam in his chart, the arbitrary old date will remain. Asked pt to discuss his health maintenance with Dr. Elena Suggs and his nurse at his appt next month and to bring all the dates that he had any of the overdue HM done in the past several years. Asked pt to call Dr. Bertha Rust office for any further questions about his instructions from him, not Dr. Cory Horner office. At no point during the conversation did pt indicate that he understood. It is noted that pt has f/u with Dr. Mauricio Rivera on 12/8/17 and Dr. Elena Suggs on 12/12/17. Chart routed to Dr. Elena Suggs.

## 2017-11-21 ENCOUNTER — TELEPHONE (OUTPATIENT)
Dept: CARDIOLOGY CLINIC | Age: 77
End: 2017-11-21

## 2017-11-21 NOTE — TELEPHONE ENCOUNTER
Pt would like a rx for  compression stockings with the foot in them. Pt states that the hose without the footing is hard to pull up over his legs. Pt, also, has another concern. Pt states that he cannot sit for long and it makes it harder for him to have a bowel movement. Please call patient to discuss this.     Thanks,    [employer]

## 2017-11-28 ENCOUNTER — TELEPHONE (OUTPATIENT)
Dept: INTERNAL MEDICINE CLINIC | Age: 77
End: 2017-11-28

## 2017-11-28 NOTE — TELEPHONE ENCOUNTER
#[phone] pt wanted Dr. Ab Falk to know that he would be seeing Dr. Rosmery Martin at [facility] today at 1 pm and he wants Dr. Ab Falk to call him and Dr. Ralph Bustillos later today. He wants all doctors on the same page as to what is happening.

## 2017-11-28 NOTE — TELEPHONE ENCOUNTER
Spoke with patient after 2 patient identifiers being note and advised to make sure that both doctors are sending office notes to Dr. Trung Lantigua so that he can keep abreast of their plans of care. Patient expressed understanding and has no further questions at this time.

## 2017-11-30 NOTE — TELEPHONE ENCOUNTER
Verified patient with two identifiers. Spoke with pt, advising him to take 100-200 mg of colace daily to help with bowel movements and to walk as much as able. Elevate legs as much as you can, avoid salt. Pt has a follow up appt with Dr. Jess Brown on 12/8/17. Nhung Nichols, ANP  Siri Martin LPN        Caller: Unspecified (1 week ago)                     He should get up and move around more if sitting causes constipation.  May take Colace 100 mg or 200mg daily as a stool softener.  I will have to ask Dr Clifford Pollard about the socks as I did not see him last and need to confirm this is a recommendation.            Previous Messages

## 2017-12-05 ENCOUNTER — TELEPHONE (OUTPATIENT)
Dept: INTERNAL MEDICINE CLINIC | Age: 77
End: 2017-12-05

## 2017-12-05 NOTE — TELEPHONE ENCOUNTER
Spoke with patient after 2 patient identifiers being note and advised per Dr. Anastasiya Rosas to see urologist, patients wife also made aware of reccomendation. Patient, and patient wife expressed understanding and has no further questions at this time.

## 2017-12-05 NOTE — TELEPHONE ENCOUNTER
Patient states he spoke with someone earlier & asked for Dr. Ab Falk to call him & he has not received a call back. Patient needs a call back from Dr. Ab Falk as soon as possible in reference to Fluid that patient reports is backing up in his stomach. Patient states he needs a call back from Dr. Ab Falk as soon as possible. Excess fluid is not coming off of stomach as advised. Please call.  Thank you

## 2017-12-08 ENCOUNTER — OFFICE VISIT (OUTPATIENT)
Dept: CARDIOLOGY CLINIC | Age: 77
End: 2017-12-08

## 2017-12-08 VITALS
HEART RATE: 82 BPM | SYSTOLIC BLOOD PRESSURE: 166 MMHG | DIASTOLIC BLOOD PRESSURE: 88 MMHG | HEIGHT: 71 IN | WEIGHT: 236.2 LBS | BODY MASS INDEX: 33.07 KG/M2 | RESPIRATION RATE: 20 BRPM | OXYGEN SATURATION: 92 %

## 2017-12-08 DIAGNOSIS — I10 ESSENTIAL HYPERTENSION: Chronic | ICD-10-CM

## 2017-12-08 DIAGNOSIS — E78.00 PURE HYPERCHOLESTEROLEMIA: Chronic | ICD-10-CM

## 2017-12-08 DIAGNOSIS — I35.0 AORTIC VALVE STENOSIS, ETIOLOGY OF CARDIAC VALVE DISEASE UNSPECIFIED: Primary | ICD-10-CM

## 2017-12-08 DIAGNOSIS — R60.0 LOCALIZED EDEMA: ICD-10-CM

## 2017-12-08 DIAGNOSIS — I25.10 CORONARY ARTERY DISEASE INVOLVING NATIVE CORONARY ARTERY OF NATIVE HEART WITHOUT ANGINA PECTORIS: Chronic | ICD-10-CM

## 2017-12-08 RX ORDER — SULFAMETHOXAZOLE AND TRIMETHOPRIM 800; 160 MG/1; MG/1
1 TABLET ORAL 2 TIMES DAILY
Refills: 0 | COMMUNITY
Start: 2017-12-02 | End: 2018-08-03

## 2017-12-08 RX ORDER — TAMSULOSIN HYDROCHLORIDE 0.4 MG/1
1 CAPSULE ORAL DAILY
Refills: 12 | COMMUNITY
Start: 2017-11-28 | End: 2018-10-26

## 2017-12-08 NOTE — PROGRESS NOTES
NAME:  Qiana Prado. :   1940   MRN:   [id number]   PCP:  Radha Pagan MD           Subjective: The patient is a 78y.o. year old male  who returns for a one month follow up. Since the last visit, patient reports no change in exercise tolerance, chest pain, edema, medication intolerance, palpitations, shortness of breath, PND/orthopnea wheezing, sputum, syncope, dizziness or light headedness. Continues to have edema. He had urinary catheter for a week. It was removed 2 days ago. Wife states he was pulling at it. He is not taking diuretic at this time as he cannot get to bathroom in time. He is focused on discussing the anatomy of his genitalia and its effect on his urine output. Weight is down ~ 20+ lbs in the last month. Past Medical History:   Diagnosis Date    Aortic stenosis     CAD (coronary artery disease)     CKD (chronic kidney disease) stage 4, GFR 15-29 ml/min ([facility]) 2017    Diabetes ([facility] [address]     Hypertension     Pulmonary hypertension        Social History   Substance Use Topics    Smoking status: Never Smoker    Smokeless tobacco: Never Used    Alcohol use 0.0 oz/week     0 Standard drinks or equivalent per week      Comment: Rare      Family History   Problem Relation Age of Onset    Diabetes Father         Review of Systems  Constitutional: Negative for fever, chills, and diaphoresis. Respiratory: Negative for cough, hemoptysis, sputum production, shortness of breath and wheezing. Cardiovascular: Negative for chest pain, palpitations, orthopnea, claudication, leg swelling and PND. Gastrointestinal: Negative for heartburn, nausea, vomiting, blood in stool and melena. Genitourinary: Negative for dysuria and flank pain. Musculoskeletal: Negative for joint pain and back pain. Skin: Negative for rash. Neurological: Negative for focal weakness, seizures, loss of consciousness, weakness and headaches. Endo/Heme/Allergies: Does not bruise/bleed easily. Psychiatric/Behavioral: Negative for memory loss. The patient does not have insomnia. Objective:       Vitals:    12/08/17 1056 12/08/17 1113   BP: 106/82 166/88   Pulse: 82    Resp: 20    SpO2: 92%    Weight: 236 lb 3.2 oz (107.1 kg)    Height: 5' 11\" (1.803 m)     Body mass index is 32.94 kg/(m^2). General PE    Gen: NAD     Mental Status - Alert. General Appearance - Not in acute distress. Verbose, difficult to redirect. Neck - no JVD     Chest and Lung Exam     Inspection: Accessory muscles - No use of accessory muscles in breathing. Auscultation:   Breath sounds: - Normal.     Cardiovascular   Inspection: Jugular vein - Bilateral - Inspection Normal.   Palpation/Percussion:   Apical Impulse: - Normal.   Auscultation: Rhythm - Regular. Heart Sounds - S1 WNL and S2 WNL. No S3 or S4. Murmurs & Other Heart Sounds: Auscultation of the heart reveals - + Murmurs. Peripheral Vascular   Upper Extremity: Inspection - Bilateral - No Cyanotic nailbeds or Digital clubbing. Lower Extremity:   Palpation: Edema - Bilateral - ++ edema. .     Neuro: A&O times 3, CN and motor grossly WNL      Data Review:     EKG -  Sinus  Rhythm   -Left axis -anterior fascicular block.    -Old anteroseptal infarct. Allergies reviewed  Allergies   Allergen Reactions    Albuterol Other (comments)     patient reports that albuterol gives him phlegm and makes it harder to breathe    Contrast Agent [Iodine] Other (comments)     \" Destroys kidneys\"     Iodinated Contrast- Oral And Iv Dye Other (comments)     \" Destroys kidneys\"    Keflex [Cephalexin] Other (comments)     confusion    Norvasc [Amlodipine] Shortness of Breath    Pcn [Penicillins] Shortness of Breath     Just started course of PCN and hydrocodone on Monday       Medications reviewed  Current Outpatient Prescriptions   Medication Sig    tamsulosin (FLOMAX) 0.4 mg capsule Take 1 Cap by mouth daily.     trimethoprim-sulfamethoxazole (BACTRIM DS, SEPTRA DS) 160-800 mg per tablet Take 1 Tab by mouth two (2) times a day.  Lactobacillus acidophilus (BACID) cap Take 1 Cap by mouth daily.  isosorbide mononitrate ER (IMDUR) 30 mg tablet TAKE 1 TABLET BY MOUTH TWICE DAILY    spironolactone (ALDACTONE) 25 mg tablet Take 1 Tab by mouth daily.  furosemide (LASIX) 40 mg tablet Take 1 Tab by mouth two (2) times a day. (Patient taking differently: Take 40 mg by mouth two (2) times a day. PT STATED HE TAKES WHEN HE FEELS LIKE IT.)    glucose blood VI test strips (ASCENSIA AUTODISC VI, ONE TOUCH ULTRA TEST VI) strip Check fsbs every day    cholecalciferol (VITAMIN D3) 1,000 unit tablet Take 5,000 Units by mouth daily.  MAGNESIUM GLYCINATE PO Take 266 mg by mouth daily.  aspirin delayed-release 81 mg tablet Take 81 mg by mouth daily.  apixaban (ELIQUIS) 2.5 mg tablet Take 2.5 mg by mouth two (2) times a day.  atorvastatin (LIPITOR) 40 mg tablet Take  by mouth daily.  OLANZapine (ZYPREXA) 5 mg tablet Take  by mouth nightly.  carvedilol (COREG) 12.5 mg tablet Take 1 Tab by mouth two (2) times a day.  MEDIHONEY, HONEY, 100 % topical paste     lidocaine (XYLOCAINE) 2 % jelly PREM TO WOUND D UTD    COQ10, UBIQUINOL, PO Take 60 mg by mouth daily.  cyanocobalamin 1,000 mcg tablet Take 1,000 mcg by mouth daily.  FOLIC ACID PO Take 892 mcg by mouth. No current facility-administered medications for this visit. Assessment:       ICD-10-CM ICD-9-CM    1. Aortic valve stenosis, etiology of cardiac valve disease unspecified D76.5 316.6 METABOLIC PANEL, COMPREHENSIVE      MAGNESIUM      2D ECHO COMPLETE ADULT (TTE) W OR WO CONTR   2. Coronary artery disease involving native coronary artery of native heart without angina pectoris I25.10 414.01 AMB POC EKG ROUTINE W/ 12 LEADS, INTER & REP      METABOLIC PANEL, COMPREHENSIVE      MAGNESIUM      2D ECHO COMPLETE ADULT (TTE) W OR WO CONTR   3.  Essential hypertension I33 084.6 METABOLIC PANEL, COMPREHENSIVE      MAGNESIUM      2D ECHO COMPLETE ADULT (TTE) W OR WO CONTR   4. Pure hypercholesterolemia J43.83 466.9 METABOLIC PANEL, COMPREHENSIVE      MAGNESIUM      2D ECHO COMPLETE ADULT (TTE) W OR WO CONTR   5. Localized edema R86.2 366.0 METABOLIC PANEL, COMPREHENSIVE      MAGNESIUM      2D ECHO COMPLETE ADULT (TTE) W OR WO CONTR        Orders Placed This Encounter    METABOLIC PANEL, COMPREHENSIVE    MAGNESIUM    AMB POC EKG ROUTINE W/ 12 LEADS, INTER & REP     Order Specific Question:   Reason for Exam:     Answer:   ROUTINE    2D ECHO COMPLETE ADULT (TTE) W OR WO CONTR     Standing Status:   Future     Standing Expiration Date:   6/8/2018     Order Specific Question:   Reason for Exam:     Answer:   AS, fluid retention     Order Specific Question:   Contrast Enhancement (Bubble Study, Definity, Optison) may be used if criteria listed in established evidence-based protocol has been identified. Answer: Yes    tamsulosin (FLOMAX) 0.4 mg capsule     Sig: Take 1 Cap by mouth daily. Refill:  12    trimethoprim-sulfamethoxazole (BACTRIM DS, SEPTRA DS) 160-800 mg per tablet     Sig: Take 1 Tab by mouth two (2) times a day. Refill:  0       Patient Active Problem List   Diagnosis Code    HTN (hypertension) I10    DM type 2 causing renal disease ([facility] [address] E11.29    Obesity E66.9    PABLO (obstructive sleep apnea) G47.33    CAD (coronary artery disease) I25.10    Pure hypercholesterolemia E78.00    Cellulitis L03.90    CKD (chronic kidney disease) stage 4, GFR 15-29 ml/min (HCC) N18.4    Dementia F03.90    Anxiety F41.9    Aortic stenosis I35.0    Pulmonary hypertension I27.20       Plan:     Patient presents for follow up. His weight is down from last month, however he remains edematous. Without his urinary catheter, he may not be taking diuretic. Reviewed [facility] records - no echo noted. Will repeat. Labs today.      [facility] [address] Cardiology    12/8/2017 Agree with note as outlined by  NP. I confirm findings in history and physical exam. No additional findings noted. Agree with plan as outlined above. Advised to see urologist ASAP. Discussed with wife.     [address] MD

## 2017-12-08 NOTE — PROGRESS NOTES
1. Have you been to the ER, urgent care clinic since your last visit? Hospitalized since your last visit? YES, URGENT CARE @ [facility]. 2. Have you seen or consulted any other health care providers outside of the [facility] since your last visit? Include any pap smears or colon screening. YES, UROLOGIST.    2 WEEK FOLLOW UP. C/O SOB, SWELLING IN BLE.

## 2017-12-08 NOTE — MR AVS SNAPSHOT
Visit Information Date & Time Provider Department Dept. Phone Encounter #  
 12/8/2017 10:30 AM Sandy Mckeon MD [facility] [phone] [id number] Your Appointments 12/12/2017  2:45 PM  
ROUTINE CARE with Petros Wheat, [address] [place] [address],[address]) Appt Note: 4 month follow up  
 [address] [facility] [address] [phone]  
[address] [place] [address] [address] [place] [address] Upcoming Health Maintenance Date Due  
 EYE EXAM RETINAL OR DILATED Q1 8/18/1952 DTaP/Tdap/Td series (1 - Tdap) 8/18/1963 ZOSTER VACCINE AGE 60> 6/18/2002 GLAUCOMA SCREENING Q2Y 8/18/2007 Pneumococcal 65+ Low/Medium Risk (1 of 2 - PCV13) 8/18/2007 MEDICARE YEARLY EXAM 8/18/2007 MICROALBUMIN Q1 12/11/2016 Influenza Age 5 to Adult 8/1/2017 LIPID PANEL Q1 2/16/2018 FOOT EXAM Q1 2/17/2018 HEMOGLOBIN A1C Q6M 2/28/2018 Allergies as of 12/8/2017  Review Complete On: 12/8/2017 By: Messi Reddy LPN Severity Noted Reaction Type Reactions Albuterol  01/22/2014    Other (comments)  
 patient reports that albuterol gives him phlegm and makes it harder to breathe Contrast Agent [Iodine]  02/17/2017    Other (comments) \" Destroys kidneys\" Iodinated Contrast- Oral And Iv Dye  02/17/2017    Other (comments) \" Destroys kidneys\" Keflex [Cephalexin]  08/31/2017    Other (comments)  
 confusion Norvasc [Amlodipine]  02/15/2017    Shortness of Breath Pcn [Penicillins]  01/22/2014   Intolerance Shortness of Breath Just started course of PCN and hydrocodone on Monday Current Immunizations  Never Reviewed No immunizations on file. Not reviewed this visit You Were Diagnosed With   
  
 Codes Comments Aortic valve stenosis, etiology of cardiac valve disease unspecified    -  Primary ICD-10-CM: I35.0 ICD-9-CM: 424.1 Coronary artery disease involving native coronary artery of native heart without angina pectoris     ICD-10-CM: I25.10 ICD-9-CM: 414.01 Essential hypertension     ICD-10-CM: I10 
ICD-9-CM: 401.9 Pure hypercholesterolemia     ICD-10-CM: E78.00 ICD-9-CM: 272.0 Localized edema     ICD-10-CM: R60.0 ICD-9-CM: 316. 3 Vitals BP Pulse Resp Height(growth percentile) Weight(growth percentile) SpO2  
 166/88 (BP 1 Location: Right arm, BP Patient Position: Sitting) 82 20 5' 11\" (1.803 m) 236 lb 3.2 oz (107.1 kg) 92% BMI Smoking Status 32.94 kg/m2 Never Smoker Vitals History BMI and BSA Data Body Mass Index Body Surface Area 32.94 kg/m 2 2.32 m 2 Preferred Pharmacy Pharmacy Name Phone [facility], [address] [place] [address] AT [address] [place] [address] Drive [phone] Your Updated Medication List  
  
   
This list is accurate as of: 12/8/17 12:00 PM.  Always use your most recent med list.  
  
  
  
  
 apixaban 2.5 mg tablet Commonly known as:  Rhenda Alban Take 2.5 mg by mouth two (2) times a day. aspirin delayed-release 81 mg tablet Take 81 mg by mouth daily. atorvastatin 40 mg tablet Commonly known as:  LIPITOR Take  by mouth daily. carvedilol 12.5 mg tablet Commonly known as:  Dareen Kaska Take 1 Tab by mouth two (2) times a day. cholecalciferol 1,000 unit tablet Commonly known as:  VITAMIN D3 Take 5,000 Units by mouth daily. COQ10 (UBIQUINOL) PO Take 60 mg by mouth daily. cyanocobalamin 1,000 mcg tablet Take 1,000 mcg by mouth daily. FOLIC ACID PO Take 400 mcg by mouth. furosemide 40 mg tablet Commonly known as:  LASIX Take 1 Tab by mouth two (2) times a day. glucose blood VI test strips strip Commonly known as:  ASCENSIA AUTODISC VI, ONE TOUCH ULTRA TEST VI Check fsbs every day  
  
 isosorbide mononitrate ER 30 mg tablet Commonly known as:  IMDUR  
TAKE 1 TABLET BY MOUTH TWICE DAILY Lactobacillus acidophilus Cap Commonly known as:  BACID Take 1 Cap by mouth daily. lidocaine 2 % jelly Commonly known as:  XYLOCAINE  
PREM TO WOUND D UTD MAGNESIUM GLYCINATE PO Take 266 mg by mouth daily. MEDIHONEY (HONEY) 100 % topical paste Generic drug:  honey  
  
 spironolactone 25 mg tablet Commonly known as:  ALDACTONE Take 1 Tab by mouth daily. tamsulosin 0.4 mg capsule Commonly known as:  FLOMAX Take 1 Cap by mouth daily. trimethoprim-sulfamethoxazole 160-800 mg per tablet Commonly known as:  BACTRIM DS, SEPTRA DS Take 1 Tab by mouth two (2) times a day. ZyPREXA 5 mg tablet Generic drug:  OLANZapine Take  by mouth nightly. We Performed the Following AMB POC EKG ROUTINE W/ 12 LEADS, INTER & REP [77784 CPT(R)] MAGNESIUM P2092507 CPT(R)] METABOLIC PANEL, COMPREHENSIVE [67354 CPT(R)] To-Do List   
 Around 12/08/2017 ECHO:  2D ECHO COMPLETE ADULT (TTE) W OR WO CONTR Introducing [facility] [place] [facility]! Katie Gaffney introduces [email or web address] patient portal. Now you can access parts of your medical record, email your doctor's office, and request medication refills online. 1. In your internet browser, go to [email or web address] 2. Click on the First Time User? Click Here link in the Sign In box. You will see the New Member Sign Up page. 3. Enter your [email or web address] Access Code exactly as it appears below. You will not need to use this code after youve completed the sign-up process. If you do not sign up before the expiration date, you must request a new code. · [email or web address] Access Code: [email or web address] Expires: 1/29/2018  3:50 PM 
 
4. Enter the last four digits of your Social Security Number (xxxx) and Date of Birth (mm/dd/yyyy) as indicated and click Submit. You will be taken to the next sign-up page. 5. Create a [employer] Works ID. This will be your [employer] Works login ID and cannot be changed, so think of one that is secure and easy to remember. 6. Create a [employer] Works password. You can change your password at any time. 7. Enter your Password Reset Question and Answer. This can be used at a later time if you forget your password. 8. Enter your e-mail address. You will receive e-mail notification when new information is available in [address] 9. Click Sign Up. You can now view and download portions of your medical record. 10. Click the Download Summary menu link to download a portable copy of your medical information. If you have questions, please visit the Frequently Asked Questions section of the [employer] website. Remember, [employer] Works is NOT to be used for urgent needs. For medical emergencies, dial 911. Now available from your iPhone and Android! Please provide this summary of care documentation to your next provider. Your primary care clinician is listed as Danilo RAINES. If you have any questions after today's visit, please call [phone].

## 2017-12-09 LAB
ALBUMIN SERPL-MCNC: 3.8 G/DL (ref 3.5–4.8)
ALBUMIN/GLOB SERPL: 1 {RATIO} (ref 1.2–2.2)
ALP SERPL-CCNC: 107 IU/L (ref 39–117)
ALT SERPL-CCNC: 18 IU/L (ref 0–44)
AST SERPL-CCNC: 27 IU/L (ref 0–40)
BILIRUB SERPL-MCNC: 1.1 MG/DL (ref 0–1.2)
BUN SERPL-MCNC: 39 MG/DL (ref 8–27)
BUN/CREAT SERPL: 17 (ref 10–24)
CALCIUM SERPL-MCNC: 9.5 MG/DL (ref 8.6–10.2)
CHLORIDE SERPL-SCNC: 102 MMOL/L (ref 96–106)
CO2 SERPL-SCNC: 28 MMOL/L (ref 18–29)
CREAT SERPL-MCNC: 2.23 MG/DL (ref 0.76–1.27)
GFR SERPLBLD CREATININE-BSD FMLA CKD-EPI: 28 ML/MIN/1.73
GFR SERPLBLD CREATININE-BSD FMLA CKD-EPI: 32 ML/MIN/1.73
GLOBULIN SER CALC-MCNC: 3.8 G/DL (ref 1.5–4.5)
GLUCOSE SERPL-MCNC: 67 MG/DL (ref 65–99)
INTERPRETATION: NORMAL
MAGNESIUM SERPL-MCNC: 2.1 MG/DL (ref 1.6–2.3)
POTASSIUM SERPL-SCNC: 4.6 MMOL/L (ref 3.5–5.2)
PROT SERPL-MCNC: 7.6 G/DL (ref 6–8.5)
SODIUM SERPL-SCNC: 145 MMOL/L (ref 134–144)

## 2017-12-12 ENCOUNTER — OFFICE VISIT (OUTPATIENT)
Dept: INTERNAL MEDICINE CLINIC | Age: 77
End: 2017-12-12

## 2017-12-12 VITALS
BODY MASS INDEX: 32.48 KG/M2 | SYSTOLIC BLOOD PRESSURE: 162 MMHG | HEART RATE: 78 BPM | DIASTOLIC BLOOD PRESSURE: 80 MMHG | OXYGEN SATURATION: 92 % | HEIGHT: 71 IN | WEIGHT: 232 LBS | TEMPERATURE: 97.2 F

## 2017-12-12 DIAGNOSIS — F02.818 LATE ONSET ALZHEIMER'S DISEASE WITH BEHAVIORAL DISTURBANCE (HCC): ICD-10-CM

## 2017-12-12 DIAGNOSIS — G30.1 LATE ONSET ALZHEIMER'S DISEASE WITH BEHAVIORAL DISTURBANCE (HCC): ICD-10-CM

## 2017-12-12 DIAGNOSIS — N18.30 CONTROLLED TYPE 2 DIABETES MELLITUS WITH STAGE 3 CHRONIC KIDNEY DISEASE, WITHOUT LONG-TERM CURRENT USE OF INSULIN (HCC): Primary | ICD-10-CM

## 2017-12-12 DIAGNOSIS — E11.22 CONTROLLED TYPE 2 DIABETES MELLITUS WITH STAGE 3 CHRONIC KIDNEY DISEASE, WITHOUT LONG-TERM CURRENT USE OF INSULIN (HCC): Primary | ICD-10-CM

## 2017-12-12 DIAGNOSIS — I10 ESSENTIAL HYPERTENSION: Chronic | ICD-10-CM

## 2017-12-12 DIAGNOSIS — I50.20 SYSTOLIC CONGESTIVE HEART FAILURE, UNSPECIFIED CONGESTIVE HEART FAILURE CHRONICITY: ICD-10-CM

## 2017-12-12 DIAGNOSIS — R60.0 LEG EDEMA: ICD-10-CM

## 2017-12-12 RX ORDER — CIPROFLOXACIN 250 MG/1
TABLET, FILM COATED ORAL EVERY 12 HOURS
COMMUNITY
End: 2018-10-26

## 2017-12-12 NOTE — LETTER
12/18/2017 2:16 PM 
 
Mr. Marleen Santillan. [facility] [address] Dear Marleen Santillan.: 
 
Please find your most recent results below. Resulted Orders HEMOGLOBIN A1C WITH EAG Result Value Ref Range Hemoglobin A1c 6.8 (H) 4.8 - 5.6 % Comment:  
            Pre-diabetes: 5.7 - 6.4 Diabetes: >6.4 Glycemic control for adults with diabetes: <7.0 Estimated average glucose 148 mg/dL Narrative Performed at:  [address]  [id number] : Queenie Hayden MD, Phone:  [phone] LIPID PANEL Result Value Ref Range Cholesterol, total 243 (H) 100 - 199 mg/dL Triglyceride 105 0 - 149 mg/dL HDL Cholesterol 50 >39 mg/dL VLDL, calculated 21 5 - 40 mg/dL LDL, calculated 172 (H) 0 - 99 mg/dL Narrative Performed at:  [address]  [id number] : Queenie Hayden MD, Phone:  [phone] RECOMMENDATIONS: 
Diabetes is well controlled with diet . Cholesterol is too high--please restart lipitor Please call me if you have any questions: [phone] Sincerely, 
 
 
Tucker Mann MD

## 2017-12-12 NOTE — MR AVS SNAPSHOT
Visit Information Date & Time Provider Department Dept. Phone Encounter #  
 12/12/2017  2:45 PM Meagan Gonzalez, [address] [place] [address],[address] [phone] [id number] Follow-up Instructions Return in about 4 months (around 4/12/2018) for dm-2 chf dementia. Your Appointments 12/20/2017  9:00 AM  
ECHO CARDIOGRAMS 2D with ECHO, [facility] Appt Note: Dr. Sheridan [address]  
[phone] [address] [facility] [address] [address]  
  
    
 3/13/2018 10:45 AM  
3 MONTH with Bruna Lester MD  
[facility]) Appt Note:  [address]  
[phone] [address] [facility] [address] Upcoming Health Maintenance Date Due  
 EYE EXAM RETINAL OR DILATED Q1 8/18/1952 DTaP/Tdap/Td series (1 - Tdap) 8/18/1963 ZOSTER VACCINE AGE 60> 6/18/2002 GLAUCOMA SCREENING Q2Y 8/18/2007 Pneumococcal 65+ Low/Medium Risk (1 of 2 - PCV13) 8/18/2007 MEDICARE YEARLY EXAM 8/18/2007 MICROALBUMIN Q1 12/11/2016 Influenza Age 5 to Adult 8/1/2017 LIPID PANEL Q1 2/16/2018 FOOT EXAM Q1 2/17/2018 HEMOGLOBIN A1C Q6M 2/28/2018 Allergies as of 12/12/2017  Review Complete On: 12/12/2017 By: Meagan Gonzalez MD  
  
 Severity Noted Reaction Type Reactions Albuterol  01/22/2014    Other (comments)  
 patient reports that albuterol gives him phlegm and makes it harder to breathe Contrast Agent [Iodine]  02/17/2017    Other (comments) \" Destroys kidneys\" Iodinated Contrast- Oral And Iv Dye  02/17/2017    Other (comments) \" Destroys kidneys\" Keflex [Cephalexin]  08/31/2017    Other (comments)  
 confusion Norvasc [Amlodipine]  02/15/2017    Shortness of Breath Pcn [Penicillins]  01/22/2014   Intolerance Shortness of Breath Just started course of PCN and hydrocodone on Monday Current Immunizations  Never Reviewed No immunizations on file. Not reviewed this visit You Were Diagnosed With   
  
 Codes Comments Controlled type 2 diabetes mellitus with stage 3 chronic kidney disease, without long-term current use of insulin (HCC)    -  Primary ICD-10-CM: E11.22, N18.3 ICD-9-CM: 250.40, 585.3 Essential hypertension     ICD-10-CM: I10 
ICD-9-CM: 401.9 Vitals BP Pulse Temp Height(growth percentile) Weight(growth percentile) SpO2  
 162/80 78 97.2 °F (36.2 °C) (Oral) 5' 11\" (1.803 m) 232 lb (105.2 kg) 92% BMI Smoking Status 32.36 kg/m2 Never Smoker Vitals History BMI and BSA Data Body Mass Index Body Surface Area  
 32.36 kg/m 2 2.3 m 2 Preferred Pharmacy Pharmacy Name Phone [facility], [address] [place] [address] AT [address] [place] [address] Drive [phone] Your Updated Medication List  
  
   
This list is accurate as of: 12/12/17  4:58 PM.  Always use your most recent med list.  
  
  
  
  
 aspirin delayed-release 81 mg tablet Take 81 mg by mouth daily. atorvastatin 40 mg tablet Commonly known as:  LIPITOR Take  by mouth daily. carvedilol 12.5 mg tablet Commonly known as:  Terry Peat Take 1 Tab by mouth two (2) times a day. cholecalciferol 1,000 unit tablet Commonly known as:  VITAMIN D3 Take 5,000 Units by mouth daily. CIPRO 250 mg tablet Generic drug:  ciprofloxacin HCl Take  by mouth every twelve (12) hours. COQ10 (UBIQUINOL) PO Take 60 mg by mouth daily. cyanocobalamin 1,000 mcg tablet Take 1,000 mcg by mouth daily. FOLIC ACID PO Take 400 mcg by mouth. furosemide 40 mg tablet Commonly known as:  LASIX Take 1 Tab by mouth two (2) times a day. glucose blood VI test strips strip Commonly known as:  ASCENSIA AUTODISC VI, ONE TOUCH ULTRA TEST VI Check fsbs every day  
  
 isosorbide mononitrate ER 30 mg tablet Commonly known as:  IMDUR  
TAKE 1 TABLET BY MOUTH TWICE DAILY Lactobacillus acidophilus Cap Commonly known as:  BACID Take 1 Cap by mouth daily. lidocaine 2 % jelly Commonly known as:  XYLOCAINE  
PREM TO WOUND D UTD MAGNESIUM GLYCINATE PO Take 266 mg by mouth daily. MEDIHONEY (HONEY) 100 % topical paste Generic drug:  honey  
  
 spironolactone 25 mg tablet Commonly known as:  ALDACTONE Take 1 Tab by mouth daily. tamsulosin 0.4 mg capsule Commonly known as:  FLOMAX Take 1 Cap by mouth daily. trimethoprim-sulfamethoxazole 160-800 mg per tablet Commonly known as:  BACTRIM DS, SEPTRA DS Take 1 Tab by mouth two (2) times a day. ZyPREXA 5 mg tablet Generic drug:  OLANZapine Take  by mouth nightly. We Performed the Following HEMOGLOBIN A1C WITH EAG [52293 CPT(R)] LIPID PANEL [86701 CPT(R)] Follow-up Instructions Return in about 4 months (around 4/12/2018) for dm-2 chf dementia. Introducing [facility] [place] [facility]! Kathleen Mills introduces [employer] patient portal. Now you can access parts of your medical record, email your doctor's office, and request medication refills online. 1. In your internet browser, go to [email or web address] 2. Click on the First Time User? Click Here link in the Sign In box. You will see the New Member Sign Up page. 3. Enter your [employer] Access Code exactly as it appears below. You will not need to use this code after youve completed the sign-up process. If you do not sign up before the expiration date, you must request a new code. · [employer] Access Code: [email or web address] Expires: 1/29/2018  3:50 PM 
 
4.  Enter the last four digits of your Social Security Number (xxxx) and Date of Birth (mm/dd/yyyy) as indicated and click Submit. You will be taken to the next sign-up page. 5. Create a RegBinder ID. This will be your [employer] login ID and cannot be changed, so think of one that is secure and easy to remember. 6. Create a [employer] password. You can change your password at any time. 7. Enter your Password Reset Question and Answer. This can be used at a later time if you forget your password. 8. Enter your e-mail address. You will receive e-mail notification when new information is available in [address] 9. Click Sign Up. You can now view and download portions of your medical record. 10. Click the Download Summary menu link to download a portable copy of your medical information. If you have questions, please visit the Frequently Asked Questions section of the [employer] website. Remember, [employer] is NOT to be used for urgent needs. For medical emergencies, dial 911. Now available from your iPhone and Android! Please provide this summary of care documentation to your next provider. Your primary care clinician is listed as Feliciano RAINES. If you have any questions after today's visit, please call [phone].

## 2017-12-12 NOTE — PROGRESS NOTES
HISTORY OF PRESENT ILLNESS  Dorian Mayo is a 78 y.o. male. HPI     F/u DM-2 HTN and CHF  Saw Dr Kassie Brown and lasix was increased to 40 mg bid for CHF with weight gain 20 lbs  Weight 4 more lbs since last visit with Dr Kassie Brown  Catheter was removed because pt was pulling at it constantly  Pt not taking the fluid pill daily now  C/o new fluid filled blister on RLE  eliquias was stopped since last visit -reviewed [facility] discharge summary--pt refused heparin for DVT prohphylaixs so took eliquis--stopped now    No fsbs at home, not taking tradjenta  Remains confused per wife  Missed visit with [facility] today--Dr Garcia Shown for dementia  Not sleeping well --up at night but not taking zyprexa      Last visit:  Pt here with wife in hospital f/u from [facility]  Discharged to home 10/27/17--d/c papers available but no summary  [facility]  Became more agitated and sent to [facility]--dx CAP and UTI and dementia with delusions  Treated and started on on zyprexa 5 mg hs--saw Dr Radha cuevas MD this week and this med will be continued  Pt at home now  Started on coreg and aldactone for chf  Also started on eliquis 2.5 mg bid for ? Reason. Pt stopped d/t bruising of right arm  [facility] healed this week  Has paraphimosis and had indwelling christine that was removed while inpatient at [facility].  C/o unable to control urine now with incontinence  Has been told he has a very open urethral opening    Patient Active Problem List    Diagnosis Date Noted    Dementia 02/15/2017    Anxiety 02/15/2017    Aortic stenosis     Pulmonary hypertension     Cellulitis 02/14/2017    CKD (chronic kidney disease) stage 4, GFR 15-29 ml/min ([address] 02/14/2017    HTN (hypertension) 04/15/2011    DM type 2 causing renal disease ([facility] 04/15/2011    Obesity 04/15/2011    PABLO (obstructive sleep apnea) 04/15/2011    CAD (coronary artery disease) 04/15/2011    Pure hypercholesterolemia 04/15/2011     Current Outpatient Prescriptions   Medication Sig Dispense Refill    ciprofloxacin HCl (CIPRO) 250 mg tablet Take  by mouth every twelve (12) hours.  tamsulosin (FLOMAX) 0.4 mg capsule Take 1 Cap by mouth daily. 12    Lactobacillus acidophilus (BACID) cap Take 1 Cap by mouth daily.  carvedilol (COREG) 12.5 mg tablet Take 1 Tab by mouth two (2) times a day. 60 Tab 11    isosorbide mononitrate ER (IMDUR) 30 mg tablet TAKE 1 TABLET BY MOUTH TWICE DAILY 60 Tab 11    furosemide (LASIX) 40 mg tablet Take 1 Tab by mouth two (2) times a day. (Patient taking differently: Take 40 mg by mouth two (2) times a day. PT STATED HE TAKES WHEN HE FEELS LIKE IT.) 60 Tab 11    glucose blood VI test strips (ASCENSIA AUTODISC VI, ONE TOUCH ULTRA TEST VI) strip Check fsbs every day 50 Strip 11    MEDIHONEY, HONEY, 100 % topical paste   10    lidocaine (XYLOCAINE) 2 % jelly PREM TO WOUND D UTD  6    cholecalciferol (VITAMIN D3) 1,000 unit tablet Take 5,000 Units by mouth daily.  MAGNESIUM GLYCINATE PO Take 266 mg by mouth daily.  COQ10, UBIQUINOL, PO Take 60 mg by mouth daily.  aspirin delayed-release 81 mg tablet Take 81 mg by mouth daily.  trimethoprim-sulfamethoxazole (BACTRIM DS, SEPTRA DS) 160-800 mg per tablet Take 1 Tab by mouth two (2) times a day.  0    atorvastatin (LIPITOR) 40 mg tablet Take  by mouth daily.  OLANZapine (ZYPREXA) 5 mg tablet Take  by mouth nightly.  spironolactone (ALDACTONE) 25 mg tablet Take 1 Tab by mouth daily. 30 Tab 11    cyanocobalamin 1,000 mcg tablet Take 1,000 mcg by mouth daily.  FOLIC ACID PO Take 681 mcg by mouth.        Allergies   Allergen Reactions    Albuterol Other (comments)     patient reports that albuterol gives him phlegm and makes it harder to breathe    Contrast Agent [Iodine] Other (comments)     \" Destroys kidneys\"     Iodinated Contrast- Oral And Iv Dye Other (comments)     \" Destroys kidneys\"    Keflex [Cephalexin] Other (comments)     confusion    Norvasc [Amlodipine] Shortness of Breath    Pcn [Penicillins] Shortness of Breath     Just started course of PCN and hydrocodone on Monday      Lab Results  Component Value Date/Time   Hemoglobin A1c 6.2 08/31/2017 02:53 PM   Hemoglobin A1c 7.4 02/15/2017 05:10 AM   Hemoglobin A1c 8.7 12/11/2015 11:24 AM   Glucose 67 12/08/2017 12:48 PM   Glucose (POC) 152 02/18/2017 11:37 AM   Microalb/Creat ratio (ug/mg creat.) 187.5 12/11/2015 11:24 AM   LDL, calculated 96.2 02/16/2017 06:56 AM   Creatinine (POC) 4.1 05/01/2011 01:16 PM   Creatinine 2.23 12/08/2017 12:48 PM      Lab Results  Component Value Date/Time   GFR est non-AA 28 12/08/2017 12:48 PM   GFRNA, POC 16 05/01/2011 01:16 PM   GFR est AA 32 12/08/2017 12:48 PM   GFRAA, POC 19 05/01/2011 01:16 PM   Creatinine 2.23 12/08/2017 12:48 PM   Creatinine (POC) 4.1 05/01/2011 01:16 PM   BUN 39 12/08/2017 12:48 PM   BUN (POC) 84 05/01/2011 01:16 PM   Sodium 145 12/08/2017 12:48 PM   Sodium (POC) 119 05/01/2011 01:16 PM   Potassium 4.6 12/08/2017 12:48 PM   Potassium (POC) 8.7 05/01/2011 01:16 PM   Chloride 102 12/08/2017 12:48 PM   Chloride (POC) 99 05/01/2011 01:16 PM   CO2 28 12/08/2017 12:48 PM   Magnesium 2.1 12/08/2017 12:48 PM   Phosphorus 3.6 02/16/2017 06:56 AM   PTH, Intact 90.3 02/16/2017 06:56 AM        ROS    Physical Exam   Constitutional: He appears well-developed and well-nourished. No distress. Appears stated age, tangential speech, pleasant   HENT:   Head: Normocephalic. Cardiovascular: Normal rate, regular rhythm and normal heart sounds. Exam reveals no gallop and no friction rub. No murmur heard. Pulmonary/Chest: Effort normal and breath sounds normal. No respiratory distress. He has no wheezes. He has no rales. He exhibits no tenderness. Abdominal: Soft. Musculoskeletal: He exhibits edema. 2-3 plus pitting LE edema with large right LE fluid filled blister   Neurological: He is alert. Psychiatric: He has a normal mood and affect. Nursing note and vitals reviewed. ASSESSMENT and PLAN  Diagnoses and all orders for this visit:    1. Controlled type 2 diabetes mellitus with stage 3 chronic kidney disease, without long-term current use of insulin (HCC)  -     HEMOGLOBIN A1C WITH EAG  -     LIPID PANEL   Last a1c 6.2 but no longer on tradjenta    2. Essential hypertension  -     LIPID PANEL   Elevated, stressed compliance with all medicines    3. CHF   Weight down but still with 2-3 plus LE piiting edema   Stressed complinace with lasix , aldactone etc   Echo as scheduled and f/u Dr Cathleen Mendiola    4. Dementia   Restless and agitated at night per his wife   Instructed pt to restart zyprexa    Wife will get him a f/u appt with psych Dr Samanta Rdz  Follow-up Disposition:  Return in about 4 months (around 4/12/2018) for dm-2 chf dementia.

## 2017-12-13 LAB
CHOLEST SERPL-MCNC: 243 MG/DL (ref 100–199)
EST. AVERAGE GLUCOSE BLD GHB EST-MCNC: 148 MG/DL
HBA1C MFR BLD: 6.8 % (ref 4.8–5.6)
HDLC SERPL-MCNC: 50 MG/DL
LDLC SERPL CALC-MCNC: 172 MG/DL (ref 0–99)
TRIGL SERPL-MCNC: 105 MG/DL (ref 0–149)
VLDLC SERPL CALC-MCNC: 21 MG/DL (ref 5–40)

## 2017-12-13 NOTE — PROGRESS NOTES
Tell pt or wife---Diabetes is well controlled with diet .  Cholesterol is too high--please restart lipitor

## 2017-12-15 ENCOUNTER — TELEPHONE (OUTPATIENT)
Dept: INTERNAL MEDICINE CLINIC | Age: 77
End: 2017-12-15

## 2017-12-15 NOTE — TELEPHONE ENCOUNTER
----- Message from Elsy Bunch sent at 12/15/2017  8:23 AM EST -----  Regarding: Dr. Suzi Plata  Pt states that he needs Dr. Lalitha Ye look at the blister, before coming back to Dr. Alessandra Lynn. Pt did not want to wait to verify message.     Best contact #: [phone]        Message copied/pasted from [facility]

## 2018-03-14 ENCOUNTER — TELEPHONE (OUTPATIENT)
Dept: INTERNAL MEDICINE CLINIC | Age: 78
End: 2018-03-14

## 2018-03-14 NOTE — TELEPHONE ENCOUNTER
Returned page march 13, at 21:45. Called by pts nurse from his facility. Pt getting wound care treatment, and had wounds debrided earlier in the day, and was in pain. No pain meds ordered. Order placed for tylenol. Will defer to pcp, dr Helen Troncoso, regarding any additional pain meds.

## 2018-04-09 ENCOUNTER — TELEPHONE (OUTPATIENT)
Dept: INTERNAL MEDICINE CLINIC | Age: 78
End: 2018-04-09

## 2018-04-09 NOTE — TELEPHONE ENCOUNTER
Pt is requesting a call back from the nurse regarding lab work. Pt contact [phone].        Message received & copied from [facility]

## 2018-05-13 NOTE — PROGRESS NOTES
HISTORY OF PRESENT ILLNESS  Tita Valdivia is a 78 y.o. male. HPI      F/u DM-2 HTN and diastolic CHF, ckd 4 ( Dr Aroldo Barton) DEmentia  Hx urine retention but pt family dc'd christine against medical advice from [facility]  Has had wound care treatments from [facility] currently    Here with wife and sister, just moved into sisters house after staying in [address] [place], [place] [address]  Pt did not like staying in the facility  Able to walk in sisters house  Weight up 20 lbs, unable to lay flat at night due dyspnea  Pt 's wife states he is taking lasix 40 mg bid, papers from [address] [place] [address] have him taking lasix 80 mg bid  Continues to drain fluid from legsleft> right . Will be going to [facility] lymphedema clinic in about 2 weeks      Last a1c 6.8     Last visit:  Saw Dr Rosette Carey and lasix was increased to 40 mg bid for CHF with weight gain 20 lbs  Weight 4 more lbs since last visit with Dr Rosette Carey  Catheter was removed because pt was pulling at it constantly  Pt not taking the fluid pill daily now  C/o new fluid filled blister on RLE  eliquias was stopped since last visit -reviewed [facility] discharge summary--pt refused heparin for DVT prohphylaixs so took eliquis--stopped now    Patient Active Problem List    Diagnosis Date Noted    Dementia 02/15/2017    Anxiety 02/15/2017    Aortic stenosis     Pulmonary hypertension ([address]     Cellulitis 02/14/2017    CKD (chronic kidney disease) stage 4, GFR 15-29 ml/min ([address] 02/14/2017    HTN (hypertension) 04/15/2011    DM type 2 causing renal disease ([address] 04/15/2011    Obesity 04/15/2011    PABLO (obstructive sleep apnea) 04/15/2011    CAD (coronary artery disease) 04/15/2011    Pure hypercholesterolemia 04/15/2011     Current Outpatient Prescriptions   Medication Sig Dispense Refill    ciprofloxacin HCl (CIPRO) 250 mg tablet Take  by mouth every twelve (12) hours.  tamsulosin (FLOMAX) 0.4 mg capsule Take 1 Cap by mouth daily.   12    trimethoprim-sulfamethoxazole (BACTRIM DS, SEPTRA DS) 160-800 mg per tablet Take 1 Tab by mouth two (2) times a day.  0    atorvastatin (LIPITOR) 40 mg tablet Take  by mouth daily.  OLANZapine (ZYPREXA) 5 mg tablet Take  by mouth nightly.  Lactobacillus acidophilus (BACID) cap Take 1 Cap by mouth daily.  carvedilol (COREG) 12.5 mg tablet Take 1 Tab by mouth two (2) times a day. 60 Tab 11    isosorbide mononitrate ER (IMDUR) 30 mg tablet TAKE 1 TABLET BY MOUTH TWICE DAILY 60 Tab 11    spironolactone (ALDACTONE) 25 mg tablet Take 1 Tab by mouth daily. 30 Tab 11    furosemide (LASIX) 40 mg tablet Take 1 Tab by mouth two (2) times a day. (Patient taking differently: Take 40 mg by mouth two (2) times a day. PT STATED HE TAKES WHEN HE FEELS LIKE IT.) 60 Tab 11    glucose blood VI test strips (ASCENSIA AUTODISC VI, ONE TOUCH ULTRA TEST VI) strip Check fsbs every day 50 Strip 11    MEDIHONEY, HONEY, 100 % topical paste   10    lidocaine (XYLOCAINE) 2 % jelly PREM TO WOUND D UTD  6    cholecalciferol (VITAMIN D3) 1,000 unit tablet Take 5,000 Units by mouth daily.  MAGNESIUM GLYCINATE PO Take 266 mg by mouth daily.  COQ10, UBIQUINOL, PO Take 60 mg by mouth daily.  cyanocobalamin 1,000 mcg tablet Take 1,000 mcg by mouth daily.  FOLIC ACID PO Take 090 mcg by mouth.  aspirin delayed-release 81 mg tablet Take 81 mg by mouth daily.        Allergies   Allergen Reactions    Albuterol Other (comments)     patient reports that albuterol gives him phlegm and makes it harder to breathe    Contrast Agent [Iodine] Other (comments)     \" Destroys kidneys\"     Iodinated Contrast- Oral And Iv Dye Other (comments)     \" Destroys kidneys\"    Keflex [Cephalexin] Other (comments)     confusion    Norvasc [Amlodipine] Shortness of Breath    Pcn [Penicillins] Shortness of Breath     Just started course of PCN and hydrocodone on Monday     Social History   Substance Use Topics    Smoking status: Never Smoker    Smokeless tobacco: Never Used    Alcohol use 0.0 oz/week     0 Standard drinks or equivalent per week      Comment: Rare      Lab Results  Component Value Date/Time   WBC 8.4 07/17/2017 04:40 AM   HGB 11.8 (L) 07/17/2017 04:40 AM   Hemoglobin (POC) 11.9 (L) 05/01/2011 01:16 PM   HCT 35.8 (L) 07/17/2017 04:40 AM   Hematocrit (POC) 35 (L) 05/01/2011 01:16 PM   PLATELET 031 89/22/9979 04:40 AM   MCV 93.0 07/17/2017 04:40 AM     Lab Results  Component Value Date/Time   Hemoglobin A1c 6.8 (H) 12/12/2017 04:26 PM   Hemoglobin A1c 6.2 (H) 08/31/2017 02:53 PM   Hemoglobin A1c 7.4 (H) 02/15/2017 05:10 AM   Glucose 67 12/08/2017 12:48 PM   Glucose (POC) 152 (H) 02/18/2017 11:37 AM   Microalb/Creat ratio (ug/mg creat.) 187.5 (H) 12/11/2015 11:24 AM   LDL, calculated 172 (H) 12/12/2017 04:26 PM   Creatinine (POC) 4.1 (H) 05/01/2011 01:16 PM   Creatinine 2.23 (H) 12/08/2017 12:48 PM      Lab Results  Component Value Date/Time   Cholesterol, total 243 (H) 12/12/2017 04:26 PM   HDL Cholesterol 50 12/12/2017 04:26 PM   LDL, calculated 172 (H) 12/12/2017 04:26 PM   Triglyceride 105 12/12/2017 04:26 PM   CHOL/HDL Ratio 3.4 02/16/2017 06:56 AM     Lab Results  Component Value Date/Time   GFR est non-AA 28 (L) 12/08/2017 12:48 PM   GFRNA, POC 16 (L) 05/01/2011 01:16 PM   GFR est AA 32 (L) 12/08/2017 12:48 PM   GFRAA, POC 19 (L) 05/01/2011 01:16 PM   Creatinine 2.23 (H) 12/08/2017 12:48 PM   Creatinine (POC) 4.1 (H) 05/01/2011 01:16 PM   BUN 39 (H) 12/08/2017 12:48 PM   BUN (POC) 84 (H) 05/01/2011 01:16 PM   Sodium 145 (H) 12/08/2017 12:48 PM   Sodium (POC) 119 (LL) 05/01/2011 01:16 PM   Potassium 4.6 12/08/2017 12:48 PM   Potassium (POC) 8.7 (HH) 05/01/2011 01:16 PM   Chloride 102 12/08/2017 12:48 PM   Chloride (POC) 99 05/01/2011 01:16 PM   CO2 28 12/08/2017 12:48 PM   Magnesium 2.1 12/08/2017 12:48 PM   Phosphorus 3.6 02/16/2017 06:56 AM   PTH, Intact 90.3 (H) 02/16/2017 06:56 AM        ROS    Physical Exam   Constitutional: He appears well-developed and well-nourished. No distress. Appears stated age, obese   HENT:   Head: Normocephalic. Cardiovascular: Normal rate, regular rhythm and normal heart sounds. Exam reveals no gallop and no friction rub. No murmur heard. Pulmonary/Chest: Effort normal and breath sounds normal. No respiratory distress. He has no wheezes. He has no rales. He exhibits no tenderness. Abdominal: Soft. Musculoskeletal: He exhibits edema. 2-3 plus tight LE edema to thighs   Neurological: He is alert. Psychiatric: He has a normal mood and affect. Nursing note and vitals reviewed. ASSESSMENT and PLAN  Diagnoses and all orders for this visit:    1. Chronic diastolic congestive heart failure ([address]  -     [facility]   Weight up 20 lbs-severe leg edema and increased dyspnea   Lasix 80 mg qam and 40 mg qpm   Low sodium diet and fluid restrict  2. Late onset Alzheimer's disease with behavioral disturbance   Appears stable,, remeron added. at [facility]--continue medicines  3. Bilateral leg edema  -     METABOLIC PANEL, BASIC  -     [facility]   Elevated   Start lymphedema clinic treatments in 2 weeks   Increase lasix 80 qmam, 40 mg qpm  4. Chronic kidney disease (CKD), active medical management without dialysis, stage 4 (severe) ([facility])  -     METABOLIC PANEL, BASIC    5. Controlled type 2 diabetes mellitus without complication, without long-term current use of insulin ([facility])  -     HEMOGLOBIN W4F WITH EAG   trulicity was initiated at [facility], will restart  Other orders  -     dulaglutide (TRULICITY) 5.44 RN/4.8 mL sub-q pen; 0.5 mL by SubCUTAneous route every seven (7) days. -     furosemide (LASIX) 40 mg tablet; Take 1 Tab by mouth two (2) times a day. 80 mg qam, 40 mg qpm      Follow-up Disposition:  Return in about 6 weeks (around 6/25/2018) for f/u chf and edema dm-2.

## 2018-05-14 ENCOUNTER — OFFICE VISIT (OUTPATIENT)
Dept: INTERNAL MEDICINE CLINIC | Age: 78
End: 2018-05-14

## 2018-05-14 VITALS
OXYGEN SATURATION: 92 % | SYSTOLIC BLOOD PRESSURE: 101 MMHG | DIASTOLIC BLOOD PRESSURE: 61 MMHG | HEIGHT: 71 IN | BODY MASS INDEX: 35.42 KG/M2 | WEIGHT: 253 LBS | HEART RATE: 70 BPM

## 2018-05-14 DIAGNOSIS — I50.32 CHRONIC DIASTOLIC CONGESTIVE HEART FAILURE (HCC): Primary | ICD-10-CM

## 2018-05-14 DIAGNOSIS — E11.9 CONTROLLED TYPE 2 DIABETES MELLITUS WITHOUT COMPLICATION, WITHOUT LONG-TERM CURRENT USE OF INSULIN (HCC): ICD-10-CM

## 2018-05-14 DIAGNOSIS — G30.1 LATE ONSET ALZHEIMER'S DISEASE WITH BEHAVIORAL DISTURBANCE (HCC): ICD-10-CM

## 2018-05-14 DIAGNOSIS — F02.818 LATE ONSET ALZHEIMER'S DISEASE WITH BEHAVIORAL DISTURBANCE (HCC): ICD-10-CM

## 2018-05-14 DIAGNOSIS — R60.0 BILATERAL LEG EDEMA: ICD-10-CM

## 2018-05-14 DIAGNOSIS — N18.4 CHRONIC KIDNEY DISEASE (CKD), ACTIVE MEDICAL MANAGEMENT WITHOUT DIALYSIS, STAGE 4 (SEVERE) (HCC): ICD-10-CM

## 2018-05-14 RX ORDER — FUROSEMIDE 40 MG/1
TABLET ORAL
Qty: 270 TAB | Refills: 6 | Status: SHIPPED | OUTPATIENT
Start: 2018-05-14

## 2018-05-14 RX ORDER — FUROSEMIDE 40 MG/1
40 TABLET ORAL 2 TIMES DAILY
Qty: 90 TAB | Refills: 6 | Status: SHIPPED | OUTPATIENT
Start: 2018-05-14 | End: 2018-05-14 | Stop reason: SDUPTHER

## 2018-05-14 RX ORDER — MIRTAZAPINE 7.5 MG/1
7.5 TABLET, FILM COATED ORAL
COMMUNITY

## 2018-05-14 RX ORDER — RISPERIDONE 1 MG/1
TABLET, FILM COATED ORAL DAILY
COMMUNITY

## 2018-05-14 NOTE — MR AVS SNAPSHOT
[address] [place] [address] 
[phone] Patient: Joe Araya. MRN:  Bridget Gongora Visit Information Date & Time Provider Department Dept. Phone Encounter #  
 5/14/2018 10:15 AM Shana Zamora, [address] [place] [address],[address] [phone] [id number] Follow-up Instructions Return in about 6 weeks (around 6/25/2018) for f/u chf and edema dm-2. Upcoming Health Maintenance Date Due  
 EYE EXAM RETINAL OR DILATED Q1 8/18/1952 DTaP/Tdap/Td series (1 - Tdap) 8/18/1963 ZOSTER VACCINE AGE 60> 6/18/2002 GLAUCOMA SCREENING Q2Y 8/18/2007 Pneumococcal 65+ Low/Medium Risk (1 of 2 - PCV13) 8/18/2007 MICROALBUMIN Q1 12/11/2016 FOOT EXAM Q1 2/17/2018 MEDICARE YEARLY EXAM 3/28/2018 Influenza Age 5 to Adult 8/1/2018 HEMOGLOBIN A1C Q6M 9/20/2018 LIPID PANEL Q1 12/12/2018 Allergies as of 5/14/2018  Review Complete On: 5/14/2018 By: Isaiah Tate LPN Severity Noted Reaction Type Reactions Albuterol  01/22/2014    Other (comments)  
 patient reports that albuterol gives him phlegm and makes it harder to breathe Contrast Agent [Iodine]  02/17/2017    Other (comments) \" Destroys kidneys\" Iodinated Contrast- Oral And Iv Dye  02/17/2017    Other (comments) \" Destroys kidneys\" Keflex [Cephalexin]  08/31/2017    Other (comments)  
 confusion Norvasc [Amlodipine]  02/15/2017    Shortness of Breath Pcn [Penicillins]  01/22/2014   Intolerance Shortness of Breath Just started course of PCN and hydrocodone on Monday Current Immunizations  Never Reviewed No immunizations on file. Not reviewed this visit You Were Diagnosed With   
  
 Codes Comments Chronic diastolic congestive heart failure (HCC)    -  Primary ICD-10-CM: I50.32 
ICD-9-CM: 428.32, 428.0  Late onset Alzheimer's disease with behavioral disturbance     ICD-10-CM: G30.1, F02.81 
 ICD-9-CM: 331.0, 294.11 Bilateral leg edema     ICD-10-CM: R60.0 ICD-9-CM: 311. 3 Chronic kidney disease (CKD), active medical management without dialysis, stage 4 (severe) (HCC)     ICD-10-CM: N18.4 ICD-9-CM: 585.4 Controlled type 2 diabetes mellitus without complication, without long-term current use of insulin ([facility]     ICD-10-CM: E11.9 ICD-9-CM: 250.00 Vitals BP Pulse Height(growth percentile) Weight(growth percentile) SpO2 BMI  
 101/61 (BP 1 Location: Left arm) 70 5' 11\" (1.803 m) 253 lb (114.8 kg) 92% 35.29 kg/m2 Smoking Status Never Smoker BMI and BSA Data Body Mass Index Body Surface Area  
 35.29 kg/m 2 2.4 m 2 Preferred Pharmacy Pharmacy Name Phone [facility], [address] [place] [address] AT [address] [place] [address] Drive [phone] Your Updated Medication List  
  
   
This list is accurate as of 5/14/18 11:56 AM.  Always use your most recent med list.  
  
  
  
  
 aspirin delayed-release 81 mg tablet Take 81 mg by mouth daily. atorvastatin 40 mg tablet Commonly known as:  LIPITOR Take  by mouth daily. carvedilol 12.5 mg tablet Commonly known as:  Shiloh Es Take 1 Tab by mouth two (2) times a day. cholecalciferol 1,000 unit tablet Commonly known as:  VITAMIN D3 Take 5,000 Units by mouth daily. CIPRO 250 mg tablet Generic drug:  ciprofloxacin HCl Take  by mouth every twelve (12) hours. COQ10 (UBIQUINOL) PO Take 60 mg by mouth daily. cyanocobalamin 1,000 mcg tablet Take 1,000 mcg by mouth daily. dulaglutide 0.75 mg/0.5 mL sub-q pen Commonly known as:  TRULICITY  
0.5 mL by SubCUTAneous route every seven (7) days. FOLIC ACID PO Take 400 mcg by mouth. * furosemide 40 mg tablet Commonly known as:  LASIX Take 1 Tab by mouth two (2) times a day. * furosemide 40 mg tablet Commonly known as:  LASIX Take 1 Tab by mouth two (2) times a day. 80 mg qam, 40 mg qpm  
  
 glucose blood VI test strips strip Commonly known as:  ASCENSIA AUTODISC VI, ONE TOUCH ULTRA TEST VI Check fsbs every day  
  
 isosorbide mononitrate ER 30 mg tablet Commonly known as:  IMDUR  
TAKE 1 TABLET BY MOUTH TWICE DAILY Lactobacillus acidophilus Cap Commonly known as:  BACID Take 1 Cap by mouth daily. lidocaine 2 % jelly Commonly known as:  XYLOCAINE  
PREM TO WOUND D UTD MAGNESIUM GLYCINATE PO Take 266 mg by mouth daily. MEDIHONEY (HONEY) 100 % topical paste Generic drug:  honey  
  
 mirtazapine 7.5 mg tablet Commonly known as:  Mounika Jacks Take 7.5 mg by mouth nightly. RisperDAL 1 mg tablet Generic drug:  risperiDONE Take  by mouth daily. spironolactone 25 mg tablet Commonly known as:  ALDACTONE Take 1 Tab by mouth daily. tamsulosin 0.4 mg capsule Commonly known as:  FLOMAX Take 1 Cap by mouth daily. trimethoprim-sulfamethoxazole 160-800 mg per tablet Commonly known as:  BACTRIM DS, SEPTRA DS Take 1 Tab by mouth two (2) times a day. ZyPREXA 5 mg tablet Generic drug:  OLANZapine Take  by mouth nightly. * Notice: This list has 2 medication(s) that are the same as other medications prescribed for you. Read the directions carefully, and ask your doctor or other care provider to review them with you. Prescriptions Sent to Pharmacy Refills  
 dulaglutide (TRULICITY) 0.83 ER/8.9 mL sub-q pen 11 Si.5 mL by SubCUTAneous route every seven (7) days. Class: Normal  
 Pharmacy: [facility]  AT [address] Ph #: [phone] Route: SubCUTAneous  
 furosemide (LASIX) 40 mg tablet 6 Sig: Take 1 Tab by mouth two (2) times a day.  80 mg qam, 40 mg qpm  
 Class: Normal  
 Pharmacy: [facility] [address]  #: [phone] Route: Oral  
  
We Performed the Following HEMOGLOBIN A1C WITH EAG [82980 CPT(R)] METABOLIC PANEL, BASIC [71911 CPT(R)] REFERRAL TO CARDIOLOGY [BPE21 Custom] Follow-up Instructions Return in about 6 weeks (around 6/25/2018) for f/u chf and edema dm-2. Referral Information Referral ID Referred By Referred To  
  
 [id number] IVET RAINES MD   
   [address] [place] [address] Phone: [phone] Fax: [phone] Visits Status Start Date End Date 1 New Request 5/14/18 5/14/19 If your referral has a status of pending review or denied, additional information will be sent to support the outcome of this decision. Introducing [facility]! Malgorzata Prado introduces [employer] patient portal. Now you can access parts of your medical record, email your doctor's office, and request medication refills online. 1. In your internet browser, go to [email or web address] 2. Click on the First Time User? Click Here link in the Sign In box. You will see the New Member Sign Up page. 3. Enter your [employer] Access Code exactly as it appears below. You will not need to use this code after youve completed the sign-up process. If you do not sign up before the expiration date, you must request a new code. · [employer] Access Code: [email or web address] Expires: 8/12/2018 10:42 AM 
 
4. Enter the last four digits of your Social Security Number (xxxx) and Date of Birth (mm/dd/yyyy) as indicated and click Submit. You will be taken to the next sign-up page. 5. Create a GIVINGtraxt ID. This will be your GIVINGtraxt login ID and cannot be changed, so think of one that is secure and easy to remember. 6. Create a GIVINGtraxt password. You can change your password at any time. 7. Enter your Password Reset Question and Answer.  This can be used at a later time if you forget your password. 8. Enter your e-mail address. You will receive e-mail notification when new information is available in [address] 9. Click Sign Up. You can now view and download portions of your medical record. 10. Click the Download Summary menu link to download a portable copy of your medical information. If you have questions, please visit the Frequently Asked Questions section of the [employer] website. Remember, [employer] is NOT to be used for urgent needs. For medical emergencies, dial 911. Now available from your iPhone and Android! Please provide this summary of care documentation to your next provider. Your primary care clinician is listed as Vonda RAINES. If you have any questions after today's visit, please call [phone].

## 2018-05-15 LAB
BUN SERPL-MCNC: 97 MG/DL (ref 8–27)
BUN/CREAT SERPL: 24 (ref 10–24)
CALCIUM SERPL-MCNC: 8.2 MG/DL (ref 8.6–10.2)
CHLORIDE SERPL-SCNC: 97 MMOL/L (ref 96–106)
CO2 SERPL-SCNC: 26 MMOL/L (ref 18–29)
CREAT SERPL-MCNC: 4.01 MG/DL (ref 0.76–1.27)
EST. AVERAGE GLUCOSE BLD GHB EST-MCNC: 180 MG/DL
GFR SERPLBLD CREATININE-BSD FMLA CKD-EPI: 14 ML/MIN/1.73
GFR SERPLBLD CREATININE-BSD FMLA CKD-EPI: 16 ML/MIN/1.73
GLUCOSE SERPL-MCNC: 157 MG/DL (ref 65–99)
HBA1C MFR BLD: 7.9 % (ref 4.8–5.6)
POTASSIUM SERPL-SCNC: 4.8 MMOL/L (ref 3.5–5.2)
SODIUM SERPL-SCNC: 142 MMOL/L (ref 134–144)

## 2018-05-15 NOTE — PROGRESS NOTES
Spoke with patient after 2 patient and his sister identifiers being note and advised per Dr. Leanna Crooks pt's she should take him to the ER due to sever e kidney failure and volume overload. Needs repeat labs, US etc  . Patient and his sister expressed understanding and has no further questions at this time.

## 2018-05-15 NOTE — PROGRESS NOTES
Tell pt's she should take him to the ER due to sever e kidney failure and volume overload.  Needs repeat labs, US etc

## 2018-07-09 ENCOUNTER — OFFICE VISIT (OUTPATIENT)
Dept: SURGERY | Age: 78
End: 2018-07-09

## 2018-07-09 VITALS
OXYGEN SATURATION: 93 % | HEIGHT: 71 IN | DIASTOLIC BLOOD PRESSURE: 61 MMHG | SYSTOLIC BLOOD PRESSURE: 99 MMHG | HEART RATE: 77 BPM | RESPIRATION RATE: 16 BRPM

## 2018-07-09 DIAGNOSIS — N18.6 CKD (CHRONIC KIDNEY DISEASE) STAGE V REQUIRING CHRONIC DIALYSIS (HCC): Primary | ICD-10-CM

## 2018-07-09 DIAGNOSIS — Z99.2 CKD (CHRONIC KIDNEY DISEASE) STAGE V REQUIRING CHRONIC DIALYSIS (HCC): Primary | ICD-10-CM

## 2018-07-09 DIAGNOSIS — R60.0 BILATERAL LEG EDEMA: ICD-10-CM

## 2018-07-09 PROBLEM — E66.01 SEVERE OBESITY (BMI 35.0-39.9): Status: ACTIVE | Noted: 2018-07-09

## 2018-07-09 RX ORDER — TRAMADOL HYDROCHLORIDE 50 MG/1
50 TABLET ORAL
COMMUNITY

## 2018-07-09 RX ORDER — LACTULOSE 10 G/15ML
SOLUTION ORAL; RECTAL
COMMUNITY
End: 2018-10-26

## 2018-07-09 RX ORDER — SEVELAMER CARBONATE 800 MG/1
TABLET, FILM COATED ORAL 3 TIMES DAILY
COMMUNITY

## 2018-07-09 RX ORDER — ACETAMINOPHEN 325 MG/1
325 TABLET ORAL
COMMUNITY

## 2018-07-09 RX ORDER — METOCLOPRAMIDE 5 MG/1
5 TABLET ORAL
COMMUNITY
End: 2018-10-26

## 2018-07-09 RX ORDER — AMMONIUM LACTATE 12 G/100G
CREAM TOPICAL 2 TIMES DAILY
COMMUNITY

## 2018-07-09 RX ORDER — ONDANSETRON 4 MG/1
4 TABLET, ORALLY DISINTEGRATING ORAL
COMMUNITY

## 2018-07-09 NOTE — PROGRESS NOTES
The patient is a 78 y.o. man referred by Dr. Bryan Zhang regarding dialysis access. The patient has recently started dialysis by way of a right sided catheter. The patient is presently in a nursing facility. The patient has a history of moderate aortic stenosis, asthma, coronary artery disease, congestive heart failure, diabetes mellitus, hypercholesterolemia, hypertension, liver disease, pulmonary hypertension. The patient has never smoked. He does not use alcohol. The patient has had cardiac catheterization but has not had any coronary stents. The patient also has a history of early dementia. The patient reports that he has a lot of problems now trying to stand and walk. He does not describe shortness of breath. He denies anginal chest pain. Physical Examination:   VITAL SIGNS: BP 99/61 left arm, pulse 77, respirations 16, O2 saturation 93%, stated weight was 253 pounds. GENERAL:  On examination, the patient is an obese man sitting in a wheelchair in no acute distress. HEAD/NECK:  Right sided central catheter, No jaundice, mass or thyromegaly. LUNGS:  Clear bilaterally without wheeze, rhonchi or rales. Armen Plough HEART:  Regular without murmur, gallop or rub. EXTREMITIES:  The patient was noted to have 3+ firm but pitting edema in the lower extremities extending well into the thighs bilaterally. There is 1+ edema of the right arm. There is no edema in the left arm. On the right there is 2+ radial pulse. Right ulnar pulse is not palpated. On the left there is 2+ radial pulse. Left ulnar pulse was not palpated. Doppler exam on the left showed normal radial and ulnar doppler signals. The palmar arch examination with doppler on the left suggested possible ulnar artery dominance in flow to the left hand. Duplex ultrasound evaluation of the veins of the left upper extremity were carried out.  The degree of swelling in the right arm was felt to preclude use of that extremity at the present time for dialysis access. The cephalic vein was fairly small distally. It became the median antebrachial vein on the left which had outflow only into the deep system. There was no cephalic seen in the left upper arm. There was a brachial vein noted anterior to the brachial artery at the high antecubital level on the left. This brachial vein moved into a medial position and remained in that position proceeding up the upper arm. The brachial vein was 4.5 mm diameter at the antecubital level. Above the antecubital, it was 5.9 mm diameter. Most proximally after being joined by the basilic vein, this brachial vein was 6.1 mm diameter. The basilic vein itself on the left appeared moderately small distally in the upper arm. The patient by history is right handed. He has no history of pacemaker or defibrillator. There is no history of axillary node surgery. With respect to the patient's arteriovenous access, I would plan for creation of an arteriovenous fistula in the left upper arm, likely between the brachial artery and brachial vein near the antecubital level. Once the outflow vein had dilated sufficiently, it would be transposed. Surgery would be carried out under general anesthesia. Risks versus benefits were reviewed with the patient. Risks of surgery include bleeding, infection, failure of the fistula, ischemia of the hand, swelling of the arm, injury to vessels or nerves, risk of anesthesia, etc. The patient understands and wishes to proceed. I explained to the patient and his wife that I would not proceed with surgery while he is still has such uncontrolled peripheral edema. He did admit to drinking a good bit of fluid over the course of each day. I strongly urged the patient to substantially reduce fluid intake so that he can have reduction in his volume status.  I will speak with Dr. Lyn Terry about his situation and await confirmation from him as to when his edema is better controlled so we can proceed with surgery. Final Diagnosis:  End-stage renal disease, bilateral lower extremity edema.      cc: Eduard Napoles MD

## 2018-07-09 NOTE — MR AVS SNAPSHOT
[address], [address], [place] [address] [facility] 
[phone] Patient: Dorian Soares. MRN:  Olivia Mahoney Visit Information Date & Time Provider Department Dept. Phone Encounter #  
 7/9/2018 10:00 AM Nithin Bowers MD [facility] [address] [phone] [id number] Upcoming Health Maintenance Date Due  
 EYE EXAM RETINAL OR DILATED Q1 8/18/1952 DTaP/Tdap/Td series (1 - Tdap) 8/18/1963 ZOSTER VACCINE AGE 60> 6/18/2002 GLAUCOMA SCREENING Q2Y 8/18/2007 Pneumococcal 65+ Low/Medium Risk (1 of 2 - PCV13) 8/18/2007 MICROALBUMIN Q1 12/11/2016 FOOT EXAM Q1 2/17/2018 Influenza Age 5 to Adult 8/1/2018 HEMOGLOBIN A1C Q6M 11/14/2018 LIPID PANEL Q1 12/12/2018 Allergies as of 7/9/2018  Review Complete On: 7/9/2018 By: Marilyn Lea Severity Noted Reaction Type Reactions Albuterol  01/22/2014    Other (comments)  
 patient reports that albuterol gives him phlegm and makes it harder to breathe Contrast Agent [Iodine]  02/17/2017    Other (comments) \" Destroys kidneys\" Iodinated Contrast- Oral And Iv Dye  02/17/2017    Other (comments) \" Destroys kidneys\" Keflex [Cephalexin]  08/31/2017    Other (comments)  
 confusion Norvasc [Amlodipine]  02/15/2017    Shortness of Breath Pcn [Penicillins]  01/22/2014   Intolerance Shortness of Breath Just started course of PCN and hydrocodone on Monday Current Immunizations  Never Reviewed No immunizations on file. Not reviewed this visit Vitals BP Pulse Resp Height(growth percentile) SpO2 Smoking Status 99/61 (BP 1 Location: Left arm, BP Patient Position: Sitting) 77 16 5' 11\" (1.803 m) 93% Never Smoker Preferred Pharmacy Pharmacy Name Phone [facility], [facility] AT [address] [place] [address] Drive [phone] Your Updated Medication List  
  
   
This list is accurate as of 7/9/18 12:20 PM.  Always use your most recent med list.  
  
  
  
  
 aspirin delayed-release 81 mg tablet Take 81 mg by mouth daily. atorvastatin 40 mg tablet Commonly known as:  LIPITOR Take  by mouth daily. carvedilol 12.5 mg tablet Commonly known as:  Gaylin Yale Take 1 Tab by mouth two (2) times a day. cholecalciferol 1,000 unit tablet Commonly known as:  VITAMIN D3 Take 5,000 Units by mouth daily. CIPRO 250 mg tablet Generic drug:  ciprofloxacin HCl Take  by mouth every twelve (12) hours. COQ10 (UBIQUINOL) PO Take 60 mg by mouth daily. cyanocobalamin 1,000 mcg tablet Take 1,000 mcg by mouth daily. dulaglutide 0.75 mg/0.5 mL sub-q pen Commonly known as:  TRULICITY  
0.5 mL by SubCUTAneous route every seven (7) days. FOLIC ACID PO Take 400 mcg by mouth. * furosemide 40 mg tablet Commonly known as:  LASIX Take 1 Tab by mouth two (2) times a day. * furosemide 40 mg tablet Commonly known as:  LASIX TAKE 2 TABLETS(80MG) BY MOUTH EVERY MORNING AND TAKE 1 TABLET(40MG) BY MOUTH EVERY EVENING  
  
 glucose blood VI test strips strip Commonly known as:  ASCENSIA AUTODISC VI, ONE TOUCH ULTRA TEST VI Check fsbs every day  
  
 isosorbide mononitrate ER 30 mg tablet Commonly known as:  IMDUR  
TAKE 1 TABLET BY MOUTH TWICE DAILY Lactobacillus acidophilus Cap Commonly known as:  BACID Take 1 Cap by mouth daily. lidocaine 2 % jelly Commonly known as:  XYLOCAINE  
PREM TO WOUND D UTD MAGNESIUM GLYCINATE PO Take 266 mg by mouth daily. Mapap 325 mg tablet Generic drug:  acetaminophen Take 325 mg by mouth every six (6) hours as needed for Pain. Give 2 tablets or 650 mg by mouth every 6 hours as needed for pain MEDIHONEY (HONEY) 100 % topical paste Generic drug:  honey  
  
 metoclopramide HCl 5 mg tablet Commonly known as:  REGLAN Take 5 mg by mouth Before breakfast, lunch, and dinner. Take before meals & at bedtime  
  
 mirtazapine 7.5 mg tablet Commonly known as:  Rajiv Efren Take 7.5 mg by mouth nightly. ondansetron 4 mg disintegrating tablet Commonly known as:  ZOFRAN ODT Take 4 mg by mouth every six (6) hours as needed for Nausea. RisperDAL 1 mg tablet Generic drug:  risperiDONE Take  by mouth daily. sevelamer carbonate 800 mg Tab tab Commonly known as:  Rai Canales Take  by mouth three (3) times daily. Take 2 tablets by mouth with meals  
  
 spironolactone 25 mg tablet Commonly known as:  ALDACTONE Take 1 Tab by mouth daily. tamsulosin 0.4 mg capsule Commonly known as:  FLOMAX Take 1 Cap by mouth daily. traMADol 50 mg tablet Commonly known as:  ULTRAM  
Take 50 mg by mouth every six (6) hours as needed for Pain. trimethoprim-sulfamethoxazole 160-800 mg per tablet Commonly known as:  BACTRIM DS, SEPTRA DS Take 1 Tab by mouth two (2) times a day. XIFAXAN 550 mg tablet Generic drug:  rifAXIMin Take 550 mg by mouth two (2) times a day. For hepatic encephalopathy ZyPREXA 5 mg tablet Generic drug:  OLANZapine Take  by mouth nightly. * Notice: This list has 2 medication(s) that are the same as other medications prescribed for you. Read the directions carefully, and ask your doctor or other care provider to review them with you. Introducing [facility]! [facility] introduces [employer] patient portal. Now you can access parts of your medical record, email your doctor's office, and request medication refills online. 1. In your internet browser, go to [email or web address] 2. Click on the First Time User? Click Here link in the Sign In box. You will see the New Member Sign Up page. 3. Enter your And[employer] Access Code exactly as it appears below.  You will not need to use this code after youve completed the sign-up process. If you do not sign up before the expiration date, you must request a new code. · [employer] Access Code: [email or web address] Expires: 8/12/2018 10:42 AM 
 
4. Enter the last four digits of your Social Security Number (xxxx) and Date of Birth (mm/dd/yyyy) as indicated and click Submit. You will be taken to the next sign-up page. 5. Create a [employer] ID. This will be your [employer] login ID and cannot be changed, so think of one that is secure and easy to remember. 6. Create a [employer] password. You can change your password at any time. 7. Enter your Password Reset Question and Answer. This can be used at a later time if you forget your password. 8. Enter your e-mail address. You will receive e-mail notification when new information is available in [address] 9. Click Sign Up. You can now view and download portions of your medical record. 10. Click the Download Summary menu link to download a portable copy of your medical information. If you have questions, please visit the Frequently Asked Questions section of the [employer] website. Remember, [employer] is NOT to be used for urgent needs. For medical emergencies, dial 911. Now available from your iPhone and Android! Please provide this summary of care documentation to your next provider. Your primary care clinician is listed as Feliciano RAINES. If you have any questions after today's visit, please call [phone].

## 2018-07-19 ENCOUNTER — TELEPHONE (OUTPATIENT)
Dept: SURGERY | Age: 78
End: 2018-07-19

## 2018-07-19 NOTE — TELEPHONE ENCOUNTER
Spoke with Mr Jelani Velasco. Pt is agreeable with being admitted to [facility] on 8/1/18 to be dialyzed before surgery on 8/3/18. Pt gave verbal permission to speak with his sister, Jose Tran [phone]) regarding transportation for 8/1/18. Left v/m for Ms Eugene Vallecillo, will try to reach on Friday.

## 2018-07-26 NOTE — TELEPHONE ENCOUNTER
Spoke with Mr Jelani Velasco' sister, Jose Tran. Reviewed Dr Raina Bell to have pt admitted on 8/1/18 with surgery scheduled for 8/3/18. Ms Eugene Vallecillo states Mr Jelani Velasco will be moving in with her on Friday, 7/27/18. She also advised she would bring him to [address] [address] [facility] on 8/1/18. Spoke with [facility] ([phone]) on 7/25/18. Faxed surgery information 7/25/18 to [facility] [phone], confirmation received.

## 2018-08-01 ENCOUNTER — HOSPITAL ENCOUNTER (INPATIENT)
Age: 78
LOS: 2 days | Discharge: HOME | DRG: 640 | End: 2018-08-03
Attending: SURGERY | Admitting: SURGERY
Payer: MEDICARE

## 2018-08-01 PROBLEM — N04.9 ANASARCA ASSOCIATED WITH DISORDER OF KIDNEY: Status: ACTIVE | Noted: 2018-08-01

## 2018-08-01 PROBLEM — N18.6 ESRD (END STAGE RENAL DISEASE) (HCC): Status: ACTIVE | Noted: 2018-08-01

## 2018-08-01 PROCEDURE — 65270000029 HC RM PRIVATE

## 2018-08-01 PROCEDURE — 74011250637 HC RX REV CODE- 250/637: Performed by: SURGERY

## 2018-08-01 PROCEDURE — 74011250636 HC RX REV CODE- 250/636: Performed by: SURGERY

## 2018-08-01 PROCEDURE — 5A1D70Z PERFORMANCE OF URINARY FILTRATION, INTERMITTENT, LESS THAN 6 HOURS PER DAY: ICD-10-PCS | Performed by: INTERNAL MEDICINE

## 2018-08-01 PROCEDURE — 90935 HEMODIALYSIS ONE EVALUATION: CPT

## 2018-08-01 RX ORDER — HEPARIN SODIUM 1000 [USP'U]/ML
1000 INJECTION, SOLUTION INTRAVENOUS; SUBCUTANEOUS
Status: DISCONTINUED | OUTPATIENT
Start: 2018-08-01 | End: 2018-08-03 | Stop reason: HOSPADM

## 2018-08-01 RX ORDER — FUROSEMIDE 80 MG/1
80 TABLET ORAL DAILY
Status: DISCONTINUED | OUTPATIENT
Start: 2018-08-01 | End: 2018-08-03 | Stop reason: HOSPADM

## 2018-08-01 RX ORDER — HEPARIN SODIUM 5000 [USP'U]/ML
5000 INJECTION, SOLUTION INTRAVENOUS; SUBCUTANEOUS EVERY 8 HOURS
Status: DISCONTINUED | OUTPATIENT
Start: 2018-08-01 | End: 2018-08-03 | Stop reason: HOSPADM

## 2018-08-01 RX ORDER — MIRTAZAPINE 15 MG/1
7.5 TABLET, FILM COATED ORAL
Status: DISCONTINUED | OUTPATIENT
Start: 2018-08-01 | End: 2018-08-03 | Stop reason: HOSPADM

## 2018-08-01 RX ORDER — SEVELAMER CARBONATE 800 MG/1
800 TABLET, FILM COATED ORAL
Status: DISCONTINUED | OUTPATIENT
Start: 2018-08-01 | End: 2018-08-03 | Stop reason: HOSPADM

## 2018-08-01 RX ORDER — TAMSULOSIN HYDROCHLORIDE 0.4 MG/1
0.4 CAPSULE ORAL DAILY
Status: DISCONTINUED | OUTPATIENT
Start: 2018-08-01 | End: 2018-08-03 | Stop reason: HOSPADM

## 2018-08-01 RX ORDER — ISOSORBIDE MONONITRATE 30 MG/1
30 TABLET, EXTENDED RELEASE ORAL 2 TIMES DAILY
Status: DISCONTINUED | OUTPATIENT
Start: 2018-08-01 | End: 2018-08-02

## 2018-08-01 RX ORDER — AMMONIUM LACTATE 12 G/100G
LOTION TOPICAL 2 TIMES DAILY
Status: DISCONTINUED | OUTPATIENT
Start: 2018-08-01 | End: 2018-08-03 | Stop reason: HOSPADM

## 2018-08-01 RX ORDER — SODIUM CHLORIDE 0.9 % (FLUSH) 0.9 %
5-10 SYRINGE (ML) INJECTION AS NEEDED
Status: DISCONTINUED | OUTPATIENT
Start: 2018-08-01 | End: 2018-08-03 | Stop reason: HOSPADM

## 2018-08-01 RX ORDER — SPIRONOLACTONE 25 MG/1
25 TABLET ORAL DAILY
Status: DISCONTINUED | OUTPATIENT
Start: 2018-08-01 | End: 2018-08-03 | Stop reason: HOSPADM

## 2018-08-01 RX ORDER — TRAMADOL HYDROCHLORIDE 50 MG/1
50 TABLET ORAL
Status: DISCONTINUED | OUTPATIENT
Start: 2018-08-01 | End: 2018-08-03 | Stop reason: HOSPADM

## 2018-08-01 RX ORDER — CARVEDILOL 12.5 MG/1
12.5 TABLET ORAL 2 TIMES DAILY
Status: DISCONTINUED | OUTPATIENT
Start: 2018-08-01 | End: 2018-08-02

## 2018-08-01 RX ORDER — SODIUM CHLORIDE 9 MG/ML
25 INJECTION, SOLUTION INTRAVENOUS CONTINUOUS
Status: DISCONTINUED | OUTPATIENT
Start: 2018-08-03 | End: 2018-08-01

## 2018-08-01 RX ORDER — RISPERIDONE 1 MG/1
1 TABLET, FILM COATED ORAL DAILY
Status: DISCONTINUED | OUTPATIENT
Start: 2018-08-01 | End: 2018-08-03 | Stop reason: HOSPADM

## 2018-08-01 RX ORDER — ONDANSETRON 4 MG/1
4 TABLET, ORALLY DISINTEGRATING ORAL
Status: DISCONTINUED | OUTPATIENT
Start: 2018-08-01 | End: 2018-08-03 | Stop reason: HOSPADM

## 2018-08-01 RX ORDER — ATORVASTATIN CALCIUM 40 MG/1
40 TABLET, FILM COATED ORAL DAILY
Status: DISCONTINUED | OUTPATIENT
Start: 2018-08-01 | End: 2018-08-03 | Stop reason: HOSPADM

## 2018-08-01 RX ORDER — METOCLOPRAMIDE 10 MG/1
5 TABLET ORAL
Status: DISCONTINUED | OUTPATIENT
Start: 2018-08-01 | End: 2018-08-03 | Stop reason: HOSPADM

## 2018-08-01 RX ORDER — ACETAMINOPHEN 325 MG/1
650 TABLET ORAL
Status: DISCONTINUED | OUTPATIENT
Start: 2018-08-01 | End: 2018-08-03 | Stop reason: HOSPADM

## 2018-08-01 RX ORDER — HYDROCODONE BITARTRATE AND ACETAMINOPHEN 5; 325 MG/1; MG/1
1 TABLET ORAL
Status: DISCONTINUED | OUTPATIENT
Start: 2018-08-01 | End: 2018-08-01

## 2018-08-01 RX ORDER — NALOXONE HYDROCHLORIDE 0.4 MG/ML
0.4 INJECTION, SOLUTION INTRAMUSCULAR; INTRAVENOUS; SUBCUTANEOUS AS NEEDED
Status: DISCONTINUED | OUTPATIENT
Start: 2018-08-01 | End: 2018-08-03 | Stop reason: HOSPADM

## 2018-08-01 RX ORDER — SODIUM CHLORIDE 0.9 % (FLUSH) 0.9 %
5-10 SYRINGE (ML) INJECTION EVERY 8 HOURS
Status: DISCONTINUED | OUTPATIENT
Start: 2018-08-01 | End: 2018-08-03 | Stop reason: HOSPADM

## 2018-08-01 RX ORDER — FUROSEMIDE 40 MG/1
40 TABLET ORAL
Status: DISCONTINUED | OUTPATIENT
Start: 2018-08-01 | End: 2018-08-01

## 2018-08-01 RX ADMIN — MIRTAZAPINE 7.5 MG: 15 TABLET, FILM COATED ORAL at 22:04

## 2018-08-01 RX ADMIN — METOCLOPRAMIDE HYDROCHLORIDE: 10 TABLET ORAL at 18:38

## 2018-08-01 RX ADMIN — RIFAXIMIN 550 MG: 550 TABLET ORAL at 18:37

## 2018-08-01 RX ADMIN — SEVELAMER CARBONATE 800 MG: 800 TABLET, FILM COATED ORAL at 18:38

## 2018-08-01 RX ADMIN — CARVEDILOL 12.5 MG: 12.5 TABLET, FILM COATED ORAL at 18:38

## 2018-08-01 RX ADMIN — LACTULOSE 40 G: 20 SOLUTION ORAL at 22:00

## 2018-08-01 RX ADMIN — LACTULOSE 20 G: 20 SOLUTION ORAL at 18:42

## 2018-08-01 RX ADMIN — ISOSORBIDE MONONITRATE 30 MG: 30 TABLET, EXTENDED RELEASE ORAL at 18:38

## 2018-08-01 NOTE — PROGRESS NOTES
Surgery The patient is being admitted for management of severe edema with ultrafiltration and hemodialysis in preparation for arteriovenous access surgery on Friday.  
 
Linh Woodard MD

## 2018-08-01 NOTE — CONSULTS
Patient name: Tamanna Torres MRN: [id number]      [facility]OLOGY SPECIALISTS  Initial Consult Note  DOA:8/1/2018  DOS: 8/1/2018  Requested by: Dr. Maycol Tompkins  Reason: Evaluation and management of ESRD/Edema  Source: pt/chart review    Assessment:  ESRD (iX Flores; TTS; Dr. Wiliam Ramírez)  HTN  Generalized edema- of LE/Scrotal/penile and abd distension  DM-2  Hx of R hydronephrosis in past  Anemia of ESRD- mild  SHPT    Pt is admitted for extra volume removal in anticipation for AV access surgery on Friday. Spoke with Dr. Maycol Tompkins who wanted more volume removed prior to surgery. Isolated UF was arranged today and he tolerated well. Used his R AV access and got 3 Kg UF    Plan/Recommendations:  Isolated UF today as above  Usual HD Rx tomm and then UF again on Friday am before surgery  Ct home BP meds  No need for EPO at present. Last hgb stable. Check labs tomm with HD  Ct Renvela with meals  D/C Lasix at night, as pt worried about not able to make it to bathroom    HPI: Tamanna Torres is a 78 y.o. male with PMH significant for ESRD on HD, HTN, AS, Cirrhosis>>admitted electively for AV access surgery on Friday  He has sig volume overload and needs more volume removal prior to surgery  He was admitted in advance for aggressive UF and HD  He got UF earlier today and saw him towards end of Rx  His main c/o was focused on difficulty with his penile edema and using proper urinal.   Denies n/v/cp or sob.  No blood in urine  Did not want lasix at night in the hospital setting  No blood in stool  Has R permacath for access    PMH:    Past Medical History:   Diagnosis Date    Aortic stenosis     Asthma     CAD (coronary artery disease)     Calculus of kidney     Chronic kidney disease     CKD (chronic kidney disease) stage 4, GFR 15-29 ml/min ([facility]) 2/14/2017    Congestive heart failure (HCC)     Diabetes (HCC)     Hypercholesterolemia     Hypertension     Liver disease     Pulmonary hypertension (HCC)        PSH:  Past Surgical History:   Procedure Laterality Date    CARDIAC SURG PROCEDURE UNLIST      cardiac cath without stents x3       Allergies   Allergen Reactions    Albuterol Other (comments)     patient reports that albuterol gives him phlegm and makes it harder to breathe    Contrast Agent [Iodine] Other (comments)     \" Destroys kidneys\"     Iodinated Contrast- Oral And Iv Dye Other (comments)     \" Destroys kidneys\"    Keflex [Cephalexin] Other (comments)     confusion    Norvasc [Amlodipine] Shortness of Breath    Pcn [Penicillins] Shortness of Breath     Just started course of PCN and hydrocodone on Monday       Prescriptions Prior to Admission   Medication Sig    sevelamer carbonate (RENVELA) 800 mg tab tab Take  by mouth three (3) times daily. Take 2 tablets by mouth with meals    ondansetron (ZOFRAN ODT) 4 mg disintegrating tablet Take 4 mg by mouth every six (6) hours as needed for Nausea.  acetaminophen (MAPAP) 325 mg tablet Take 325 mg by mouth every six (6) hours as needed for Pain. Give 2 tablets or 650 mg by mouth every 6 hours as needed for pain    metoclopramide HCl (REGLAN) 5 mg tablet Take 5 mg by mouth Before breakfast, lunch, and dinner. Take before meals & at bedtime    traMADol (ULTRAM) 50 mg tablet Take 50 mg by mouth every six (6) hours as needed for Pain.  rifAXIMin (XIFAXAN) 550 mg tablet Take 550 mg by mouth two (2) times a day. For hepatic encephalopathy    ammonium lactate (AMLACTIN) 12 % topical cream Apply  to affected area two (2) times a day.  rub in to affected area well  Apply to both legs topically two times a day    Amino Acids-Protein Hydrolys (PROSOURCE NO CARB) 15-60 gram-kcal/30 mL liqd Take  by mouth. Give 30 ML by mouth two times a day    lactulose (CHRONULAC) 10 gram/15 mL solution Take  by mouth. Give 60 ML by mouth four times a day - do not hold - encephalopathy    mirtazapine (REMERON) 7.5 mg tablet Take 7.5 mg by mouth nightly.  risperiDONE (RISPERDAL) 1 mg tablet Take  by mouth daily.  dulaglutide (TRULICITY) 3.30 WK/2.9 mL sub-q pen 0.5 mL by SubCUTAneous route every seven (7) days.  furosemide (LASIX) 40 mg tablet TAKE 2 TABLETS(80MG) BY MOUTH EVERY MORNING AND TAKE 1 TABLET(40MG) BY MOUTH EVERY EVENING    ciprofloxacin HCl (CIPRO) 250 mg tablet Take  by mouth every twelve (12) hours.  tamsulosin (FLOMAX) 0.4 mg capsule Take 1 Cap by mouth daily.  trimethoprim-sulfamethoxazole (BACTRIM DS, SEPTRA DS) 160-800 mg per tablet Take 1 Tab by mouth two (2) times a day.  atorvastatin (LIPITOR) 40 mg tablet Take  by mouth daily.  OLANZapine (ZYPREXA) 5 mg tablet Take  by mouth nightly.  Lactobacillus acidophilus (BACID) cap Take 1 Cap by mouth daily.  carvedilol (COREG) 12.5 mg tablet Take 1 Tab by mouth two (2) times a day.  isosorbide mononitrate ER (IMDUR) 30 mg tablet TAKE 1 TABLET BY MOUTH TWICE DAILY    spironolactone (ALDACTONE) 25 mg tablet Take 1 Tab by mouth daily.  furosemide (LASIX) 40 mg tablet Take 1 Tab by mouth two (2) times a day. (Patient taking differently: Take 40 mg by mouth two (2) times a day. PT STATED HE TAKES WHEN HE FEELS LIKE IT.)    glucose blood VI test strips (ASCENSIA AUTODISC VI, ONE TOUCH ULTRA TEST VI) strip Check fsbs every day    MEDIHONEY, HONEY, 100 % topical paste     lidocaine (XYLOCAINE) 2 % jelly PREM TO WOUND D UTD    cholecalciferol (VITAMIN D3) 1,000 unit tablet Take 5,000 Units by mouth daily.  MAGNESIUM GLYCINATE PO Take 266 mg by mouth daily.     COQ10, UBIQUINOL, PO Take 60 mg by mouth daily.  cyanocobalamin 1,000 mcg tablet Take 1,000 mcg by mouth daily.  FOLIC ACID PO Take 206 mcg by mouth.  aspirin delayed-release 81 mg tablet Take 81 mg by mouth daily. Social history:  Social History     Social History    Marital status:      Spouse name: N/A    Number of children: N/A    Years of education: N/A     Occupational History    Not on file. Social History Main Topics    Smoking status: Never Smoker    Smokeless tobacco: Never Used    Alcohol use No    Drug use: Yes     Special: Prescription, OTC    Sexual activity: No     Other Topics Concern    Not on file     Social History Narrative       Family history:  No family history of CKD or ESRD    Review of Systems: as noted in HPI;   Physical Exam:  Visit Vitals    /49  Comment: UF complete. NAD    Pulse 81    Temp 98.3 °F (36.8 °C)    Resp 20    SpO2 98%       Gen: elderly, ill appearing in  NAD  HEENT: No icterus, mmm  Neck: No JVD  Lungs/Chest wall: Clear. No accessory muscle use. R permacath intact  Cardiovascular: Regular rate, normal rhythm. +TABATHA at R base. No pericardial rub  Abdomen/: Soft,  NT, mild distension, +scrotal/penile edema  Ext: No clubbing, No cyanosis. 3+ peripheral edema  Skin: warm, dry.  Venous stasis+  CNS: alert and awake, co-operative    Labs/Data:   Lab Results   Component Value Date/Time    WBC 8.4 07/17/2017 04:40 AM    Hemoglobin (POC) 11.9 (L) 05/01/2011 01:16 PM    HGB 11.8 (L) 07/17/2017 04:40 AM    Hematocrit (POC) 35 (L) 05/01/2011 01:16 PM    HCT 35.8 (L) 07/17/2017 04:40 AM    PLATELET 786 03/88/9291 04:40 AM    MCV 93.0 07/17/2017 04:40 AM       Lab Results   Component Value Date/Time    Sodium 142 05/14/2018 12:09 PM    Potassium 4.8 05/14/2018 12:09 PM    Chloride 97 05/14/2018 12:09 PM    CO2 26 05/14/2018 12:09 PM    Anion gap 9 07/17/2017 04:40 AM    Glucose 157 (H) 05/14/2018 12:09 PM    BUN 97 (HH) 05/14/2018 12:09 PM    Creatinine 4.01 (H) 05/14/2018 12:09 PM    BUN/Creatinine ratio 24 05/14/2018 12:09 PM    GFR est AA 16 (L) 05/14/2018 12:09 PM    GFR est non-AA 14 (L) 05/14/2018 12:09 PM    Calcium 8.2 (L) 05/14/2018 12:09 PM         Intake/Output Summary (Last 24 hours) at 08/01/18 1442  Last data filed at 08/01/18 1335   Gross per 24 hour   Intake                0 ml   Output             3000 ml   Net            -3000 ml       Wt Readings from Last 3 Encounters:   05/14/18 114.8 kg (253 lb)   12/12/17 105.2 kg (232 lb)   12/08/17 107.1 kg (236 lb 3.2 oz)       Patient seen and examined. Chart reviewed.  Labs, data and other pertinent notes reviewed from previous admit    Discussed with pt/HD RN, floor RN and Dr. Mimi Pritchett    Signed by:  Peterson Sung MD  Nephrology and HTN             \\

## 2018-08-01 NOTE — PROGRESS NOTES
Patient refuses to have pants taken off and skin checked. Patient Patient reports that there is specific wound care instructions with medi-honey for leg wrap. Patient refuses leg wrap to be taken off.

## 2018-08-01 NOTE — DIALYSIS
[facility]  [phone] Vitals   Pre   Post   Assessment   Pre   Post    
Temp  Temp: 97.7 °F (36.5 °C) (08/01/18 1105)  97.3 LOC  AXOx3 AXOx3 HR   80 81 Lungs   CTA  CTA  
B/P   BP: 132/67 (UF intiated. ) (08/01/18 1105) 123/51 Cardiac   RRR  RRR Resp    18 18 Skin   Warm and dry  Warm and dry Pain level   0 0 Edema BLE + 4 
 BLE +4 Orders: Duration:   Start:    1105 End:    1335 Total:   2.5 Dialyzer:   Dialyzer/Set Up Inspection: Rosalino Renae (08/01/18 1105) K Bath:   Dialysate K (mEq/L): 3.5 (08/01/18 1105) Ca Bath:   Dialysate CA (mEq/L): 2.0 (08/01/18 1105) Na/Bicarb:   Dialysate NA (mEq/L): 140 (08/01/18 1105) Target Fluid Removal:   Goal/Amount of Fluid to Remove (mL): 3000 mL (08/01/18 1105) Access Type & Location:   (L) CVC Positive aspiration and flush. No s/s of infection observed to the site. Dressing dry and intact with 2x2 guaze present and dated for 07/31/2018. Labs Obtained/Reviewed Critical Results Called   Date when labs were drawn- 
Hgb-   
HGB Date Value Ref Range Status 07/17/2017 11.8 (L) 12.1 - 17.0 g/dL Final  
 
K-   
Potassium Date Value Ref Range Status 05/14/2018 4.8 3.5 - 5.2 mmol/L Final  
 
Ca-  
Calcium Date Value Ref Range Status 05/14/2018 8.2 (L) 8.6 - 10.2 mg/dL Final  
 
Bun-  
BUN Date Value Ref Range Status 05/14/2018 97 (HH) 8 - 27 mg/dL Final  
 
Creat-  
Creatinine Date Value Ref Range Status 05/14/2018 4.01 (H) 0.76 - 1.27 mg/dL Final  
 
  
Medications/ Blood Products Given Name   Dose   Route and Time N/A Blood Volume Processed (BVP):    0 Net Fluid Removed:  3500 mL (Includes prime and rinse) Comments Time Out Done: Yes Primary Nurse Rpt Pre: PAULINA Zavala RN 
Primary Nurse Rpt Post: PAULINA Zavala RN 
Pt Education: Procedural 
Care Plan: Continue HD as prescribed Tx Summary: Patient tolerated UF treatment well for 2.5 hours.  Bp remained stable throughout treatment. Al, possible blood returned back without any problems. CVC ports cleaned, capped, and secured. Dressing changed and dated for 08/01/2018. Returned patient back to room in stable condition and reported off to primary nurse. Dialyzer lot#- [id number] Tubing lot#[id number] Admiting Diagnosis: 
Pt's previous clinic- 
Consent signed - Informed Consent Verified: Yes (08/01/18 1105) Jemal Consent - Obtained Hepatitis Status- Negative 07- Machine #- Machine Number: [id number] (08/01/18 1105) Telemetry status-N/A Pre-dialysis wt. -

## 2018-08-01 NOTE — IP AVS SNAPSHOT
[address] [facility] 
[phone] Patient: Amado Carvajal MRN: [id number] Lawrence Viral About your hospitalization You were admitted on:  August 1, 2018 You last received care in the:  [facility] GENERAL SURGERY You were discharged on:  August 3, 2018 Why you were hospitalized Your primary diagnosis was:  Not on File Your diagnoses also included:  Esrd (End Stage Renal Disease) (Hcc), Anasarca Associated With Disorder Of Kidney Follow-up Information Follow up With Details Comments Contact Info Meagan Gonzalez MD   . [address] [facility] 
[phone] Discharge Orders None A check shital indicates which time of day the medication should be taken. My Medications CHANGE how you take these medications Instructions Each Dose to Equal  
 Morning Noon Evening Bedtime * furosemide 40 mg tablet Commonly known as:  LASIX What changed:  additional instructions Your last dose was: Your next dose is: Take 1 Tab by mouth two (2) times a day. 40 mg  
    
   
   
   
  
 * furosemide 40 mg tablet Commonly known as:  LASIX What changed:  Another medication with the same name was changed. Make sure you understand how and when to take each. Your last dose was: Your next dose is: TAKE 2 TABLETS(80MG) BY MOUTH EVERY MORNING AND TAKE 1 TABLET(40MG) BY MOUTH EVERY EVENING  
     
   
   
   
  
 * Notice: This list has 2 medication(s) that are the same as other medications prescribed for you. Read the directions carefully, and ask your doctor or other care provider to review them with you. CONTINUE taking these medications  Instructions Each Dose to Equal  
 Morning Noon Evening Bedtime  
 ammonium lactate 12 % topical cream  
Commonly known as:  AMLACTIN  
   
 Your last dose was: Your next dose is:    
   
   
 Apply  to affected area two (2) times a day. rub in to affected area well  Apply to both legs topically two times a day  
     
   
   
   
  
 aspirin delayed-release 81 mg tablet Your last dose was: Your next dose is: Take 81 mg by mouth daily. 81 mg  
    
   
   
   
  
 atorvastatin 40 mg tablet Commonly known as:  LIPITOR Your last dose was: Your next dose is: Take  by mouth daily. carvedilol 12.5 mg tablet Commonly known as:  Macel Srikanth Your last dose was: Your next dose is: Take 1 Tab by mouth two (2) times a day. 12.5 mg  
    
   
   
   
  
 cholecalciferol 1,000 unit tablet Commonly known as:  VITAMIN D3 Your last dose was: Your next dose is: Take 5,000 Units by mouth daily. 5000 Units CIPRO 250 mg tablet Generic drug:  ciprofloxacin HCl Your last dose was: Your next dose is: Take  by mouth every twelve (12) hours. COQ10 (UBIQUINOL) PO Your last dose was: Your next dose is: Take 60 mg by mouth daily. 60 mg  
    
   
   
   
  
 cyanocobalamin 1,000 mcg tablet Your last dose was: Your next dose is: Take 1,000 mcg by mouth daily. 1000 mcg  
    
   
   
   
  
 dulaglutide 0.75 mg/0.5 mL sub-q pen Commonly known as:  TRULICITY Your last dose was: Your next dose is: 0.5 mL by SubCUTAneous route every seven (7) days. 0.75 mg FOLIC ACID PO Your last dose was: Your next dose is: Take 400 mcg by mouth. 400 mcg  
    
   
   
   
  
 glucose blood VI test strips strip Commonly known as:  ASCENSIA AUTODISC VI, ONE TOUCH ULTRA TEST VI Your last dose was: Your next dose is: Check fsbs every day  
     
   
   
   
  
 isosorbide mononitrate ER 30 mg tablet Commonly known as:  IMDUR Your last dose was: Your next dose is: TAKE 1 TABLET BY MOUTH TWICE DAILY Lactobacillus acidophilus Cap Commonly known as:  BACID Your last dose was: Your next dose is: Take 1 Cap by mouth daily. 1 Cap  
    
   
   
   
  
 lactulose 10 gram/15 mL solution Commonly known as:  Antionette Stein Your last dose was: Your next dose is: Take  by mouth. Give 60 ML by mouth four times a day - do not hold - encephalopathy  
     
   
   
   
  
 lidocaine 2 % jelly Commonly known as:  XYLOCAINE Your last dose was: Your next dose is:    
   
   
 PREM TO WOUND D UTD MAGNESIUM GLYCINATE PO Your last dose was: Your next dose is: Take 266 mg by mouth daily. 266 mg Mapap 325 mg tablet Generic drug:  acetaminophen Your last dose was: Your next dose is: Take 325 mg by mouth every six (6) hours as needed for Pain. Give 2 tablets or 650 mg by mouth every 6 hours as needed for pain 325 mg MEDIHONEY (HONEY) 100 % topical paste Generic drug:  honey Your last dose was: Your next dose is:    
   
   
      
   
   
   
  
 metoclopramide HCl 5 mg tablet Commonly known as:  REGLAN Your last dose was: Your next dose is: Take 5 mg by mouth Before breakfast, lunch, and dinner. Take before meals & at bedtime 5 mg  
    
   
   
   
  
 mirtazapine 7.5 mg tablet Commonly known as:  Blaine Kraus Your last dose was: Your next dose is: Take 7.5 mg by mouth nightly. 7.5 mg  
    
   
   
   
  
 ondansetron 4 mg disintegrating tablet Commonly known as:  ZOFRAN ODT Your last dose was: Your next dose is: Take 4 mg by mouth every six (6) hours as needed for Nausea. 4 mg PROSOURCE NO CARB 15-60 gram-kcal/30 mL Liqd Generic drug:  Amino Acids-Protein Hydrolys Your last dose was: Your next dose is: Take  by mouth. Give 30 ML by mouth two times a day RisperDAL 1 mg tablet Generic drug:  risperiDONE Your last dose was: Your next dose is: Take  by mouth daily. sevelamer carbonate 800 mg Tab tab Commonly known as:  Marcio Blue Your last dose was: Your next dose is: Take  by mouth three (3) times daily. Take 2 tablets by mouth with meals  
     
   
   
   
  
 spironolactone 25 mg tablet Commonly known as:  ALDACTONE Your last dose was: Your next dose is: Take 1 Tab by mouth daily. 25 mg  
    
   
   
   
  
 tamsulosin 0.4 mg capsule Commonly known as:  FLOMAX Your last dose was: Your next dose is: Take 1 Cap by mouth daily. 1 Cap  
    
   
   
   
  
 traMADol 50 mg tablet Commonly known as:  ULTRAM  
   
Your last dose was: Your next dose is: Take 50 mg by mouth every six (6) hours as needed for Pain. 50 mg  
    
   
   
   
  
 XIFAXAN 550 mg tablet Generic drug:  rifAXIMin Your last dose was: Your next dose is: Take 550 mg by mouth two (2) times a day. For hepatic encephalopathy 550 mg  
    
   
   
   
  
 ZyPREXA 5 mg tablet Generic drug:  OLANZapine Your last dose was: Your next dose is: Take  by mouth nightly. STOP taking these medications   
 trimethoprim-sulfamethoxazole 160-800 mg per tablet Commonly known as:  BACTRIM DS, SEPTRA DS Opioid Education Prescription Opioids: What You Need to Know: Prescription opioids can be used to help relieve moderate-to-severe pain and are often prescribed following a surgery or injury, or for certain health conditions. These medications can be an important part of treatment but also come with serious risks. Opioids are strong pain medicines. Examples include hydrocodone, oxycodone, fentanyl, and morphine. Heroin is an example of an illegal opioid. It is important to work with your health care provider to make sure you are getting the safest, most effective care. WHAT ARE THE RISKS AND SIDE EFFECTS OF OPIOID USE? Prescription opioids carry serious risks of addiction and overdose, especially with prolonged use. An opioid overdose, often marked by slow breathing, can cause sudden death. The use of prescription opioids can have a number of side effects as well, even when taken as directed. · Tolerance-meaning you might need to take more of a medication for the same pain relief · Physical dependence-meaning you have symptoms of withdrawal when the medication is stopped. Withdrawal symptoms can include nausea, sweating, chills, diarrhea, stomach cramps, and muscle aches. Withdrawal can last up to several weeks, depending on which drug you took and how long you took it. · Increased sensitivity to pain · Constipation · Nausea, vomiting, and dry mouth · Sleepiness and dizziness · Confusion · Depression · Low levels of testosterone that can result in lower sex drive, energy, and strength · Itching and sweating RISKS ARE GREATER WITH:      
· History of drug misuse, substance use disorder, or overdose · Mental health conditions (such as depression or anxiety) · Sleep apnea · Older age (72 years or older) · Pregnancy Avoid alcohol while taking prescription opioids. Also, unless specifically advised by your health care provider, medications to avoid include: · Benzodiazepines (such as Xanax or Valium) · Muscle relaxants (such as Soma or Flexeril) · Hypnotics (such as Ambien or Lunesta) · Other prescription opioids KNOW YOUR OPTIONS Talk to your health care provider about ways to manage your pain that don't involve prescription opioids. Some of these options may actually work better and have fewer risks and side effects. Options may include: 
· Pain relievers such as acetaminophen, ibuprofen, and naproxen · Some medications that are also used for depression or seizures · Physical therapy and exercise · Counseling to help patients learn how to cope better with triggers of pain and stress. · Application of heat or cold compress · Massage therapy · Relaxation techniques Be Informed Make sure you know the name of your medication, how much and how often to take it, and its potential risks & side effects. IF YOU ARE PRESCRIBED OPIOIDS FOR PAIN: 
· Never take opioids in greater amounts or more often than prescribed. Remember the goal is not to be pain-free but to manage your pain at a tolerable level. · Follow up with your primary care provider to: · Work together to create a plan on how to manage your pain. · Talk about ways to help manage your pain that don't involve prescription opioids. · Talk about any and all concerns and side effects. · Help prevent misuse and abuse. · Never sell or share prescription opioids · Help prevent misuse and abuse. · Store prescription opioids in a secure place and out of reach of others (this may include visitors, children, friends, and family). · Safely dispose of unused/unwanted prescription opioids: Find your community drug take-back program or your pharmacy mail-back program, or flush them down the toilet, following guidance from the Food and Drug Administration (www.fda.gov/Drugs/ResourcesForYou). · Visit www.cdc.gov/drugoverdose to learn about the risks of opioid abuse and overdose.  
· If you believe you may be struggling with addiction, tell your health care provider and ask for guidance or call [facility] [employer] at [phone]. Discharge Instructions Continue to go to dialysis as usual. 
 
Follow up with your nephrologist Dr Romayne Gables. Nilda Barnes MD 
 
 
  
  
  
Introducing [facility]! [address] introduces [employer] patient portal. Now you can access parts of your medical record, email your doctor's office, and request medication refills online. 1. In your internet browser, go to [email or web address] 2. Click on the First Time User? Click Here link in the Sign In box. You will see the New Member Sign Up page. 3. Enter your [employer] Access Code exactly as it appears below. You will not need to use this code after youve completed the sign-up process. If you do not sign up before the expiration date, you must request a new code. · [employer] Access Code: [email or web address] Expires: 8/12/2018 10:42 AM 
 
4. Enter the last four digits of your Social Security Number (xxxx) and Date of Birth (mm/dd/yyyy) as indicated and click Submit. You will be taken to the next sign-up page. 5. Create a [employer] ID. This will be your [employer] login ID and cannot be changed, so think of one that is secure and easy to remember. 6. Create a [employer] password. You can change your password at any time. 7. Enter your Password Reset Question and Answer. This can be used at a later time if you forget your password. 8. Enter your e-mail address. You will receive e-mail notification when new information is available in [address] 9. Click Sign Up. You can now view and download portions of your medical record. 10. Click the Download Summary menu link to download a portable copy of your medical information. If you have questions, please visit the Frequently Asked Questions section of the [employer] website.  Remember, [employer] is NOT to be used for urgent needs. For medical emergencies, dial 911. Now available from your iPhone and Android! Introducing Tushar Porter As a Romayne Duster patient, I wanted to make you aware of our electronic visit tool called Tushar Porter. [facility]ayne Duster 24/7 allows you to connect within minutes with a medical provider 24 hours a day, seven days a week via a mobile device or tablet or logging into a secure website from your computer. You can access Tushar Porter from anywhere in the [place]. A virtual visit might be right for you when you have a simple condition and feel like you just dont want to get out of bed, or cant get away from work for an appointment, when your [facility] is not available (evenings, weekends or holidays), or when youre out of town and need minor care. Electronic visits cost only $49 and if the Romayne Duster 24/7 provider determines a prescription is needed to treat your condition, one can be electronically transmitted to a nearby pharmacy*. Please take a moment to enroll today if you have not already done so. The enrollment process is free and takes just a few minutes. To enroll, please download the Romayne Duster 24/7 calli to your tablet or phone, or visit [email or web address] to enroll on your computer. And, as an [address] [place] [address] patient with a [employer] account, the results of your visits will be scanned into your electronic medical record and your primary care provider will be able to view the scanned results. We urge you to continue to see your regular Staceyne Camilaer provider for your ongoing medical care. And while your primary care provider may not be the one available when you seek a Tushar Porter virtual visit, the peace of mind you get from getting a real diagnosis real time can be priceless. For more information on Tushar Porter, view our Frequently Asked Questions (FAQs) at [email or web address].  
 
Sincerely, 
 
 Lizz Reynolds MD 
Chief Medical Officer [employer] *:  certain medications cannot be prescribed via Tushar Porter Providers Seen During Your Hospitalization Provider Specialty Primary office phone Vannessa Doyle MD General Surgery [phone] Your Primary Care Physician (PCP) Primary Care Physician Office Phone Office Fax Carissa Martin [phone] You are allergic to the following Allergen Reactions Albuterol Other (comments)  
 patient reports that albuterol gives him phlegm and makes it harder to breathe Contrast Agent (Iodine) Other (comments) \" Destroys kidneys\" Iodinated Contrast- Oral And Iv Dye Other (comments) \" Destroys kidneys\" Keflex (Cephalexin) Other (comments)  
 confusion Norvasc (Amlodipine) Shortness of Breath Pcn (Penicillins) Shortness of Breath Just started course of PCN and hydrocodone on Monday Recent Documentation Smoking Status Never Smoker Emergency Contacts Name Discharge Info Relation Home Work Mobile Irene Hebert DISCHARGE CAREGIVER [3] Spouse [3] [phone] Patient Belongings The following personal items are in your possession at time of discharge: 
  Dental Appliances: None  Visual Aid: Glasses      Home Medications: None   Jewelry: None  Clothing: With patient    Other Valuables: Cell Phone Please provide this summary of care documentation to your next provider. Signatures-by signing, you are acknowledging that this After Visit Summary has been reviewed with you and you have received a copy. Patient Signature:  ____________________________________________________________ Date:  ____________________________________________________________  
  
Art Arizmendi Provider Signature:  ____________________________________________________________ Date:  ____________________________________________________________

## 2018-08-01 NOTE — IP AVS SNAPSHOT
[address] [facility] 
[phone] Patient: Gerald Wiseman. MRN: [id number] [facility] A check shital indicates which time of day the medication should be taken. My Medications CHANGE how you take these medications Instructions Each Dose to Equal  
 Morning Noon Evening Bedtime * furosemide 40 mg tablet Commonly known as:  LASIX What changed:  additional instructions Your last dose was: Your next dose is: Take 1 Tab by mouth two (2) times a day. 40 mg  
    
   
   
   
  
 * furosemide 40 mg tablet Commonly known as:  LASIX What changed:  Another medication with the same name was changed. Make sure you understand how and when to take each. Your last dose was: Your next dose is: TAKE 2 TABLETS(80MG) BY MOUTH EVERY MORNING AND TAKE 1 TABLET(40MG) BY MOUTH EVERY EVENING  
     
   
   
   
  
 * Notice: This list has 2 medication(s) that are the same as other medications prescribed for you. Read the directions carefully, and ask your doctor or other care provider to review them with you. CONTINUE taking these medications Instructions Each Dose to Equal  
 Morning Noon Evening Bedtime  
 ammonium lactate 12 % topical cream  
Commonly known as:  AMLACTIN Your last dose was: Your next dose is:    
   
   
 Apply  to affected area two (2) times a day. rub in to affected area well  Apply to both legs topically two times a day  
     
   
   
   
  
 aspirin delayed-release 81 mg tablet Your last dose was: Your next dose is: Take 81 mg by mouth daily. 81 mg  
    
   
   
   
  
 atorvastatin 40 mg tablet Commonly known as:  LIPITOR Your last dose was: Your next dose is: Take  by mouth daily. carvedilol 12.5 mg tablet Commonly known as:  Sean Herrera  
   
 Your last dose was: Your next dose is: Take 1 Tab by mouth two (2) times a day. 12.5 mg  
    
   
   
   
  
 cholecalciferol 1,000 unit tablet Commonly known as:  VITAMIN D3 Your last dose was: Your next dose is: Take 5,000 Units by mouth daily. 5000 Units CIPRO 250 mg tablet Generic drug:  ciprofloxacin HCl Your last dose was: Your next dose is: Take  by mouth every twelve (12) hours. COQ10 (UBIQUINOL) PO Your last dose was: Your next dose is: Take 60 mg by mouth daily. 60 mg  
    
   
   
   
  
 cyanocobalamin 1,000 mcg tablet Your last dose was: Your next dose is: Take 1,000 mcg by mouth daily. 1000 mcg  
    
   
   
   
  
 dulaglutide 0.75 mg/0.5 mL sub-q pen Commonly known as:  TRULICITY Your last dose was: Your next dose is: 0.5 mL by SubCUTAneous route every seven (7) days. 0.75 mg FOLIC ACID PO Your last dose was: Your next dose is: Take 400 mcg by mouth. 400 mcg  
    
   
   
   
  
 glucose blood VI test strips strip Commonly known as:  ASCENSIA AUTODISC VI, ONE TOUCH ULTRA TEST VI Your last dose was: Your next dose is:    
   
   
 Check fsbs every day  
     
   
   
   
  
 isosorbide mononitrate ER 30 mg tablet Commonly known as:  IMDUR Your last dose was: Your next dose is: TAKE 1 TABLET BY MOUTH TWICE DAILY Lactobacillus acidophilus Cap Commonly known as:  BACID Your last dose was: Your next dose is: Take 1 Cap by mouth daily. 1 Cap  
    
   
   
   
  
 lactulose 10 gram/15 mL solution Commonly known as:  Stephen Quiver Your last dose was: Your next dose is: Take  by mouth. Give 60 ML by mouth four times a day - do not hold - encephalopathy  
     
   
   
   
  
 lidocaine 2 % jelly Commonly known as:  XYLOCAINE Your last dose was: Your next dose is:    
   
   
 PREM TO WOUND D UTD MAGNESIUM GLYCINATE PO Your last dose was: Your next dose is: Take 266 mg by mouth daily. 266 mg Mapap 325 mg tablet Generic drug:  acetaminophen Your last dose was: Your next dose is: Take 325 mg by mouth every six (6) hours as needed for Pain. Give 2 tablets or 650 mg by mouth every 6 hours as needed for pain 325 mg MEDIHONEY (HONEY) 100 % topical paste Generic drug:  honey Your last dose was: Your next dose is:    
   
   
      
   
   
   
  
 metoclopramide HCl 5 mg tablet Commonly known as:  REGLAN Your last dose was: Your next dose is: Take 5 mg by mouth Before breakfast, lunch, and dinner. Take before meals & at bedtime 5 mg  
    
   
   
   
  
 mirtazapine 7.5 mg tablet Commonly known as:  Connell Taylor Your last dose was: Your next dose is: Take 7.5 mg by mouth nightly. 7.5 mg  
    
   
   
   
  
 ondansetron 4 mg disintegrating tablet Commonly known as:  ZOFRAN ODT Your last dose was: Your next dose is: Take 4 mg by mouth every six (6) hours as needed for Nausea. 4 mg PROSOURCE NO CARB 15-60 gram-kcal/30 mL Liqd Generic drug:  Amino Acids-Protein Hydrolys Your last dose was: Your next dose is: Take  by mouth. Give 30 ML by mouth two times a day RisperDAL 1 mg tablet Generic drug:  risperiDONE Your last dose was: Your next dose is: Take  by mouth daily. sevelamer carbonate 800 mg Tab tab Commonly known as:  [facility] Your last dose was: Your next dose is: Take  by mouth three (3) times daily. Take 2 tablets by mouth with meals  
     
   
   
   
  
 spironolactone 25 mg tablet Commonly known as:  ALDACTONE Your last dose was: Your next dose is: Take 1 Tab by mouth daily. 25 mg  
    
   
   
   
  
 tamsulosin 0.4 mg capsule Commonly known as:  FLOMAX Your last dose was: Your next dose is: Take 1 Cap by mouth daily. 1 Cap  
    
   
   
   
  
 traMADol 50 mg tablet Commonly known as:  ULTRAM  
   
Your last dose was: Your next dose is: Take 50 mg by mouth every six (6) hours as needed for Pain. 50 mg  
    
   
   
   
  
 XIFAXAN 550 mg tablet Generic drug:  rifAXIMin Your last dose was: Your next dose is: Take 550 mg by mouth two (2) times a day. For hepatic encephalopathy 550 mg  
    
   
   
   
  
 ZyPREXA 5 mg tablet Generic drug:  OLANZapine Your last dose was: Your next dose is: Take  by mouth nightly. STOP taking these medications   
 trimethoprim-sulfamethoxazole 160-800 mg per tablet Commonly known as:  BACTRIM DIONNE, SEPTRA DS

## 2018-08-01 NOTE — H&P
Surgery History and Physical 
 
Subjective:  
  
Iesha Palm is a 78 y.o. male admitted on 8/1/2018 with anasarca, history of ESRD on hemodialysis, history of hepatic encephalopathy. He is scheduled for surgery on 8/3/2018 for creation of arteriovenous fistula in the left arm. Nephrologist - Dr Vinicio Sosa The patient has history of non compliance with fluid restriction. He denies anginal chest pain or dyspnea, but is not physically active. Past Medical History:  
Diagnosis Date  Aortic stenosis  Asthma  CAD (coronary artery disease)  Calculus of kidney  Chronic kidney disease  CKD (chronic kidney disease) stage 4, GFR 15-29 ml/min ([facility]) 2/14/2017  Congestive heart failure [facility] [facility]  Diabetes ([facility] [address]  Hypercholesterolemia  Hypertension  Liver disease  Pulmonary hypertension ([facility] [address] Past Surgical History:  
Procedure Laterality Date  CARDIAC SURG PROCEDURE UNLIST    
 cardiac cath without stents x3 Family History Problem Relation Age of Onset  Diabetes Father Social History Substance Use Topics  Smoking status: Never Smoker  Smokeless tobacco: Never Used  Alcohol use No  
  
Prior to Admission medications Medication Sig Start Date End Date Taking? Authorizing Provider  
sevelamer carbonate (RENVELA) 800 mg tab tab Take  by mouth three (3) times daily. Take 2 tablets by mouth with meals    Historical Provider  
ondansetron (ZOFRAN ODT) 4 mg disintegrating tablet Take 4 mg by mouth every six (6) hours as needed for Nausea. Historical Provider  
acetaminophen (MAPAP) 325 mg tablet Take 325 mg by mouth every six (6) hours as needed for Pain. Give 2 tablets or 650 mg by mouth every 6 hours as needed for pain    Historical Provider  
metoclopramide HCl (REGLAN) 5 mg tablet Take 5 mg by mouth Before breakfast, lunch, and dinner.  Take before meals & at bedtime    Historical Provider  
traMADol (ULTRAM) 50 mg tablet Take 50 mg by mouth every six (6) hours as needed for Pain. Historical Provider  
rifAXIMin (XIFAXAN) 550 mg tablet Take 550 mg by mouth two (2) times a day. For hepatic encephalopathy    Historical Provider  
ammonium lactate (AMLACTIN) 12 % topical cream Apply  to affected area two (2) times a day. rub in to affected area well  Apply to both legs topically two times a day    Historical Provider Amino Acids-Protein Hydrolys (PROSOURCE NO CARB) 15-60 gram-kcal/30 mL liqd Take  by mouth. Give 30 ML by mouth two times a day    Historical Provider  
lactulose (CHRONULAC) 10 gram/15 mL solution Take  by mouth. Give 60 ML by mouth four times a day - do not hold - encephalopathy    Historical Provider  
mirtazapine (REMERON) 7.5 mg tablet Take 7.5 mg by mouth nightly. Historical Provider  
risperiDONE (RISPERDAL) 1 mg tablet Take  by mouth daily. Historical Provider  
dulaglutide (TRULICITY) 9.05 MH/7.4 mL sub-q pen 0.5 mL by SubCUTAneous route every seven (7) days. 5/14/18   Maria Luisa Menjivar MD  
furosemide (LASIX) 40 mg tablet TAKE 2 TABLETS(80MG) BY MOUTH EVERY MORNING AND TAKE 1 TABLET(40MG) BY MOUTH EVERY EVENING 5/14/18   Maria Luisa Menjivar MD  
ciprofloxacin HCl (CIPRO) 250 mg tablet Take  by mouth every twelve (12) hours. Historical Provider  
tamsulosin (FLOMAX) 0.4 mg capsule Take 1 Cap by mouth daily. 11/28/17   Historical Provider  
trimethoprim-sulfamethoxazole (BACTRIM DS, SEPTRA DS) 160-800 mg per tablet Take 1 Tab by mouth two (2) times a day. 12/2/17   Historical Provider  
atorvastatin (LIPITOR) 40 mg tablet Take  by mouth daily. Historical Provider OLANZapine (ZYPREXA) 5 mg tablet Take  by mouth nightly. Historical Provider Lactobacillus acidophilus (BACID) cap Take 1 Cap by mouth daily. Historical Provider  
carvedilol (COREG) 12.5 mg tablet Take 1 Tab by mouth two (2) times a day.  10/31/17   Maria Luisa Menjivar MD  
isosorbide mononitrate ER (IMDUR) 30 mg tablet TAKE 1 TABLET BY MOUTH TWICE DAILY 10/31/17   Virgilio Hayes MD  
spironolactone (ALDACTONE) 25 mg tablet Take 1 Tab by mouth daily. 10/31/17   Virgilio Hayes MD  
furosemide (LASIX) 40 mg tablet Take 1 Tab by mouth two (2) times a day. Patient taking differently: Take 40 mg by mouth two (2) times a day. PT STATED HE TAKES WHEN HE FEELS LIKE IT. 10/31/17   Virgilio Hayes MD  
glucose blood VI test strips (ASCENSIA AUTODISC VI, ONE TOUCH ULTRA TEST VI) strip Check fsbs every day 10/31/17   Virgilio Hayes MD  
[facility], HONEY, 100 % topical paste  8/25/17   Historical Provider  
lidocaine (XYLOCAINE) 2 % jelly PREM TO WOUND D UTD 8/22/17   Historical Provider  
cholecalciferol (VITAMIN D3) 1,000 unit tablet Take 5,000 Units by mouth daily. Historical Provider MAGNESIUM GLYCINATE PO Take 266 mg by mouth daily. Historical Provider COQ10, UBIQUINOL, PO Take 60 mg by mouth daily. Historical Provider  
cyanocobalamin 1,000 mcg tablet Take 1,000 mcg by mouth daily. Historical Provider FOLIC ACID PO Take 223 mcg by mouth. Historical Provider  
aspirin delayed-release 81 mg tablet Take 81 mg by mouth daily. Gorge Hernandez MD  
  
Allergies Allergen Reactions  Albuterol Other (comments)  
  patient reports that albuterol gives him phlegm and makes it harder to breathe  Contrast Agent [Iodine] Other (comments) \" Destroys kidneys\"  Iodinated Contrast- Oral And Iv Dye Other (comments) \" Destroys kidneys\"  Keflex [Cephalexin] Other (comments)  
  confusion  Norvasc [Amlodipine] Shortness of Breath  Pcn [Penicillins] Shortness of Breath Just started course of PCN and hydrocodone on Monday Review of Systems: 
Pertinent items are noted in the History of Present Illness. Objective:  
 
 Patient Vitals for the past 8 hrs: 
 BP Temp Pulse Resp SpO2  
08/01/18 1555 119/59 97.7 °F (36.5 °C) 83 16 100 % 08/01/18 1335 119/49 - 81 - -  
08/01/18 1305 110/56 - 81 - -  
08/01/18 1235 143/68 - 81 - -  
08/01/18 1205 143 -  - -  
18 1140 137/60 98.3 °F (36.8 °C) 81 20 98 % 18 1135 143 - - Temp (24hrs), Av.9 °F (36.6 °C), Min:97.7 °F (36.5 °C), Max:98.3 °F (36.8 °C) Physical Exam: 
WD man,  NAD HEAD/NECK: No jaundice, mass or thyromegaly. Right central catheter. LUNGS: Clear bilaterally without wheeze, rhonchi or rales. Lizet Barrio HEART: Regular without gallop or rub. 3/6 systolic murmur left sternal border. Abdomen:  Soft, non tender, no mass or hernia noted. NEURO: Patient is alert and oriented x 3 and moves all extremities equally. Facial movement is symmetrical. Speech was normal.  
EXT: 3+ edema in lower extremities Assessment: Anasarca, ESRD, history of hepatic encephalopathy. Plan:  
 
Ultrafiltration and hemodialysis, fluid restriction in preparation for surgery 8/3/2018. Signed By: Lashawn Muniz MD   
 2018

## 2018-08-01 NOTE — PROGRESS NOTES
Paged Dr. Madai Valdez in regards to patient refusal to have pants taken off d/t left leg wound and wrap. Awaiting return call.

## 2018-08-02 VITALS
HEART RATE: 81 BPM | OXYGEN SATURATION: 98 % | DIASTOLIC BLOOD PRESSURE: 58 MMHG | TEMPERATURE: 97.4 F | RESPIRATION RATE: 16 BRPM | SYSTOLIC BLOOD PRESSURE: 123 MMHG

## 2018-08-02 LAB
HBV SURFACE AG SER QL: <0.1 INDEX
HBV SURFACE AG SER QL: NEGATIVE

## 2018-08-02 PROCEDURE — 87340 HEPATITIS B SURFACE AG IA: CPT | Performed by: INTERNAL MEDICINE

## 2018-08-02 PROCEDURE — 74011250637 HC RX REV CODE- 250/637: Performed by: SURGERY

## 2018-08-02 PROCEDURE — 36415 COLL VENOUS BLD VENIPUNCTURE: CPT | Performed by: INTERNAL MEDICINE

## 2018-08-02 PROCEDURE — 65270000029 HC RM PRIVATE

## 2018-08-02 RX ORDER — ALBUMIN HUMAN 250 G/1000ML
12.5 SOLUTION INTRAVENOUS ONCE
Status: DISPENSED | OUTPATIENT
Start: 2018-08-02 | End: 2018-08-02

## 2018-08-02 RX ORDER — HEPARIN SODIUM 1000 [USP'U]/ML
1100 INJECTION, SOLUTION INTRAVENOUS; SUBCUTANEOUS ONCE
Status: DISPENSED | OUTPATIENT
Start: 2018-08-02 | End: 2018-08-02

## 2018-08-02 RX ORDER — ISOSORBIDE MONONITRATE 30 MG/1
30 TABLET, EXTENDED RELEASE ORAL DAILY
Status: DISCONTINUED | OUTPATIENT
Start: 2018-08-03 | End: 2018-08-03 | Stop reason: HOSPADM

## 2018-08-02 RX ORDER — HEPARIN SODIUM 1000 [USP'U]/ML
1400 INJECTION, SOLUTION INTRAVENOUS; SUBCUTANEOUS ONCE
Status: DISPENSED | OUTPATIENT
Start: 2018-08-02 | End: 2018-08-02

## 2018-08-02 RX ORDER — CARVEDILOL 6.25 MG/1
6.25 TABLET ORAL 2 TIMES DAILY
Status: DISCONTINUED | OUTPATIENT
Start: 2018-08-02 | End: 2018-08-03 | Stop reason: HOSPADM

## 2018-08-02 RX ADMIN — CARVEDILOL 12.5 MG: 12.5 TABLET, FILM COATED ORAL at 08:36

## 2018-08-02 RX ADMIN — ISOSORBIDE MONONITRATE 30 MG: 30 TABLET, EXTENDED RELEASE ORAL at 08:36

## 2018-08-02 NOTE — PROGRESS NOTES
Problem: Pressure Injury - Risk of 
Goal: *Prevention of pressure injury Document Jamil Scale and appropriate interventions in the flowsheet. Outcome: Not Met 
Pressure Injury Interventions: 
Sensory Interventions: Assess changes in LOC, Keep linens dry and wrinkle-free, Maintain/enhance activity level Moisture Interventions: Absorbent underpads Activity Interventions: Increase time out of bed, Pressure redistribution bed/mattress(bed type) Mobility Interventions: Pressure redistribution bed/mattress (bed type), HOB 30 degrees or less Nutrition Interventions: Document food/fluid/supplement intake Friction and Shear Interventions: Apply protective barrier, creams and emollients, HOB 30 degrees or less, Lift sheet Problem: Falls - Risk of 
Goal: *Absence of Falls Document Edi Barnes Fall Risk and appropriate interventions in the flowsheet. Outcome: Progressing Towards Goal 
Fall Risk Interventions: 
  
 
  
 
  
 
  
 
  
 
 
 
Comments: Oriented x 4, scattered statements, fixation RA Voids BM yesterday per pt SBA 
 
------------------------- Dilaysis today- done Refuses medications, assessment of wounds, Dr. Rohit Yee aware-----pt reported he has sacral and leg wounds

## 2018-08-02 NOTE — PROGRESS NOTES
Surgery No complaint. Afebrile, VSS. Alert. 2+ edema of legs. He had UF yesterday to remove 3 Kg. He is to have his usual hemodialysis today. He will likely have UF tomorrow AM.  For creation of arteriovenous fistula left arm tomorrow around noon. Wound care team will assess legs and sacrum.  
 
Suzanna Glover MD

## 2018-08-02 NOTE — PROGRESS NOTES
Name: Jey Ambrosio. MRN: [id number] : 1940 Assessment: 
ESRD (Zondra Lunch; TTS; Dr. Dax Shine) HTN Generalized edema- of LE/Scrotal/penile and abd distension DM-2 Hx of R hydronephrosis in past 
Anemia of ESRD- mild SHPT 
  
Pt is admitted for extra volume removal in anticipation for AV access surgery on Friday. Got UF on  and getting HD today Tolerating HD. Using R TDC, 400 QB, 2K, 2.5 Ca. UF of 3 kg attempted. /52.  
  
Plan/Recommendations: 
HD today. Will do pure UF tomm in AM in anticipation of AV access tomm No need for EPO at present. Last hgb stable. Ct Renvela with meals Night time lasix stopped as pt worried about not able to make to bathroom Subjective: 
Seen on HD. Resting. Arousable. No c/o Seen around 9:40 AM 
 
ROS:  
No nausea, no vomiting No chest pain, no shortness of breath Exam: 
Visit Vitals  /52  Pulse 83  Temp 98 °F (36.7 °C) (Oral)  Resp 19  SpO2 97% Elderly frail, in NAD No icterus Clear ant/lat 
RRR, no rub 2+ edema R TDC intact Current Facility-Administered Medications Medication Dose Route Frequency Last Dose  albumin human 25% (BUMINATE) solution 12.5 g  12.5 g IntraVENous ONCE    
 heparin (porcine) 1,000 unit/mL injection 1,100 Units  1,100 Units IntraVENous ONCE    
 heparin (porcine) 1,000 unit/mL injection 1,400 Units  1,400 Units IntraVENous ONCE    
 [START ON 8/3/2018] isosorbide mononitrate ER (IMDUR) tablet 30 mg  30 mg Oral DAILY  carvedilol (COREG) tablet 6.25 mg  6.25 mg Oral BID  sodium chloride (NS) flush 5-10 mL  5-10 mL IntraVENous Q8H Stopped at 18 2200  
 sodium chloride (NS) flush 5-10 mL  5-10 mL IntraVENous PRN    
 naloxone (NARCAN) injection 0.4 mg  0.4 mg IntraVENous PRN    
 acetaminophen (TYLENOL) tablet 650 mg  650 mg Oral Q4H PRN    
 heparin (porcine) injection 5,000 Units  5,000 Units SubCUTAneous Q8H    
 ammonium lactate (LAC-HYDRIN) 12 % lotion   Topical BID  lactulose (CHRONULAC) solution 40 g  40 g Oral AC&HS 40 g at 08/01/18 2200  
 metoclopramide HCl (REGLAN) tablet 5 mg  5 mg Oral TID WITH MEALS 5 mg at 08/02/18 0078  ondansetron (ZOFRAN ODT) tablet 4 mg  4 mg Oral Q6H PRN    
 rifAXIMin (XIFAXAN) tablet 550 mg  550 mg Oral  mg at 08/02/18 3466  sevelamer carbonate (RENVELA) tab 800 mg  800 mg Oral TID WITH MEALS 800 mg at 08/02/18 6853  traMADol (ULTRAM) tablet 50 mg  50 mg Oral Q6H PRN    
 furosemide (LASIX) tablet 80 mg  80 mg Oral DAILY 80 mg at 08/02/18 3141  mirtazapine (REMERON) tablet 7.5 mg  7.5 mg Oral QHS 7.5 mg at 08/01/18 2204  risperiDONE (RisperDAL) tablet 1 mg  1 mg Oral DAILY 1 mg at 08/02/18 8486  tamsulosin (FLOMAX) capsule 0.4 mg  0.4 mg Oral DAILY 0.4 mg at 08/02/18 0836  
 atorvastatin (LIPITOR) tablet 40 mg  40 mg Oral DAILY 40 mg at 08/02/18 8434  spironolactone (ALDACTONE) tablet 25 mg  25 mg Oral DAILY 25 mg at 08/02/18 0836  
 heparin (porcine) 1,000 unit/mL injection 1,000 Units  1,000 Units InterCATHeter DIALYSIS PRN Labs/Data: 
 
Lab Results Component Value Date/Time WBC 8.4 07/17/2017 04:40 AM  
 Hemoglobin (POC) 11.9 (L) 05/01/2011 01:16 PM  
 HGB 11.8 (L) 07/17/2017 04:40 AM  
 Hematocrit (POC) 35 (L) 05/01/2011 01:16 PM  
 HCT 35.8 (L) 07/17/2017 04:40 AM  
 PLATELET 199 65/21/8315 04:40 AM  
 MCV 93.0 07/17/2017 04:40 AM  
 
 
Lab Results Component Value Date/Time  Sodium 142 05/14/2018 12:09 PM  
 Potassium 4.8 05/14/2018 12:09 PM  
 Chloride 97 05/14/2018 12:09 PM  
 CO2 26 05/14/2018 12:09 PM  
 Anion gap 9 07/17/2017 04:40 AM  
 Glucose 157 (H) 05/14/2018 12:09 PM  
 BUN 97 (HH) 05/14/2018 12:09 PM  
 Creatinine 4.01 (H) 05/14/2018 12:09 PM  
 BUN/Creatinine ratio 24 05/14/2018 12:09 PM  
 GFR est AA 16 (L) 05/14/2018 12:09 PM GFR est non-AA 14 (L) 05/14/2018 12:09 PM  
 Calcium 8.2 (L) 05/14/2018 12:09 PM  
 
 
Wt Readings from Last 3 Encounters:  
05/14/18 114.8 kg (253 lb) 12/12/17 105.2 kg (232 lb) 12/08/17 107.1 kg (236 lb 3.2 oz) Intake/Output Summary (Last 24 hours) at 08/02/18 1017 Last data filed at 08/02/18 1366 Gross per 24 hour Intake               60 ml Output             3000 ml Net            -2940 ml Patient seen and examined. Chart reviewed. Labs, data and other pertinent notes reviewed in last 24 hrs. PMH/SH/FH reviewed and unchanged compared to H&P Discussed with pt/HD RN Doe Alex MD

## 2018-08-02 NOTE — PROGRESS NOTES
Reason for Admission:   Management of severe edema with ultrafiltration and hemodialysis. RRAT Score:     35 Resources/supports as identified by patient/family:   Eric Leon [phone] Top Challenges facing patient (as identified by patient/family and CM):  Dementia and alzheimers, Non compliance with medications,authoritative. ESRD Finances/Medication cost?      Pt only has medicare part A benefits Transportation? Pt's sister and spouse transport pt to HD Support system or lacker and there of?  none Living arrangements? Presently living with sister at [address] [place] [phone] Self-care/ADLs/Cognition? Pt is diagnosed with Dementia and alzheimers Current Advanced Directive/Advance Care Plan: Full code Plan for utilizing home health:   Yes if appropriate and insurance authorize. Likelihood of readmission:  High 
              
Transition of Care Plan:    SNF- pt had previous SNF placement via [address] [place] [address]. Care Management Interventions PCP Verified by CM: Yes Mode of Transport at Discharge: [address] [facility] Transition of Care Consult (CM Consult): SNF Discharge Durable Medical Equipment:  (pt owns [employer] chair and has chair lift ) Current Support Network: Lives with Caregiver (Presently pt lives with sister at [address] [place] [phone], ) Confirm Follow Up Transport: Family Discharge Location Discharge Placement: Skilled nursing facility CM spoke with pt's spouse via phone obtained informations for intial assessment. As per spouse pt is diagnosised with Dementia and alzheimer's and has psychiatrist. Taking risperidone however he is not comply. pt is on HD Tue,Thur and Sat at [facility]. Cm encouraged spouse to apply for medicaid.  As per floor RN/Tech report pt is aggressive and noncomplince with medications. Pt refused IV placement. CM requested MD a psych consult for a competency Eval and dementia med adjustment assessment. med assist can screen pt for medicaid eligibility. CM referred pt to med assist. 
 
Paras Patel MS  
ED Case Manager Ext [phone]

## 2018-08-02 NOTE — PROGRESS NOTES
Left 2nd  with Wound care to see patient per order. (Pt reports L leg wound and sacral ulcer, refuses nursing to look at sites, Dr. Lee Pritchard aware)

## 2018-08-02 NOTE — PROGRESS NOTES
Spiritual Care Partner Volunteer visited patient in Rm 2182 on 8/2/18. Documented by: Chaplain Levi MDiv, MACE 
[phone])

## 2018-08-02 NOTE — PROGRESS NOTES
Sister came in to give patient coffee. Sister reports that patient has dementia and Respirdal will help patient. Will call Dr. Remigio Waters

## 2018-08-02 NOTE — PROGRESS NOTES
Off of floor for Dialysis. Ate breakfast prior to. AM meds given. Left message for wound care to see patient today.

## 2018-08-02 NOTE — DIALYSIS
[facility] Dialysis Team [place]  [phone] Vitals   Pre   Post   Assessment   Pre   Post    
Temp  Temp: 98 °F (36.7 °C) (08/02/18 0920)  97.8 LOC  A & O x 2, follows most commands   A & O x 2, follows most commands HR   Pulse (Heart Rate): 83 (08/02/18 0920) 82 Lungs   coarse  coarse B/P   BP: 113/52 (08/02/18 0920) 122/60 Cardiac   Regular rate and rythmn, pulses weak but palpable  Regular rate and rythmn, pulses weak but palpable Resp   Resp Rate: 19 (08/02/18 0920) 19 Skin   Dry and intact  Dry and intact Pain level   0  0 Edema  +2 on lower extremities +2 on lower extremities Orders: Duration:   Start:    0920 End:    1250 Total:   3.5 hours Dialyzer:   Dialyzer/Set Up Inspection: Charles Bulls (08/02/18 0920) K Bath:   Dialysate K (mEq/L): 2 (08/02/18 0920) Ca Bath:   Dialysate CA (mEq/L): 2.5 (08/02/18 0920) Na/Bicarb:   Dialysate NA (mEq/L): 140 (08/02/18 0920) Target Fluid Removal:   Goal/Amount of Fluid to Remove (mL): 3000 mL (08/02/18 0920) Access Type & Location:   Right IJ, no s/s of infection, dressing clean, dry and intact Labs Obtained/Reviewed Critical Results Called   Date when labs were drawn- 
Hgb-   
HGB Date Value Ref Range Status 07/17/2017 11.8 (L) 12.1 - 17.0 g/dL Final  
 
K-   
Potassium Date Value Ref Range Status 05/14/2018 4.8 3.5 - 5.2 mmol/L Final  
 
Ca-  
Calcium Date Value Ref Range Status 05/14/2018 8.2 (L) 8.6 - 10.2 mg/dL Final  
 
Bun-  
BUN Date Value Ref Range Status 05/14/2018 97 (HH) 8 - 27 mg/dL Final  
 
Creat-  
Creatinine Date Value Ref Range Status 05/14/2018 4.01 (H) 0.76 - 1.27 mg/dL Final  
 
  
Medications/ Blood Products Given Name   Dose   Route and Time None Blood Volume Processed (BVP):    76.1 Net Fluid Removed:  3000 ml Comments Time Out Done: 2648 Primary Nurse Rpt Pre: Nohemi Kasper RN  
Primary Nurse Rpt Post: Nohemi Kasper RN 
Pt Education: access care Care Plan: continue with dialysis per physician's orders. Tx Summary: Pt tolerated treatment well. RIJ prepped according to policy and procedure. Aspirated and flushed ports x 2 with NS with no problem. Quality blood flow at 400. Blood pressure was low at one point but was brought back up with NS bolus. No meds given. Post treatment: all possible blood returned from circuit. Ports x 2 flushed with NS and capped. Pt's previous clinic- 
Consent signed - Informed Consent Verified: Yes (08/02/18 0920) Jakobita Consent - Yes Hepatitis Status- Neg 7/5/18 and drawn Machine #- Machine Number: [id number] (08/02/18 0920) Telemetry status- None Pre-dialysis wt. -

## 2018-08-02 NOTE — INTERDISCIPLINARY ROUNDS
Interdisciplinary Rounds were completed on this patient. Rounds included nursing, clinical care leader, pharmacy, and case management. Plan for the day HD today, OR tomorrow Plan for the stay:continue current [address] [address] Activity:up in chair Anticipated discharge date: Weekend?

## 2018-08-02 NOTE — WOUND CARE
Wound Care Consult: Chart reviewed. Patient is alert and very talkative but is difficult to reason with at this time. Introduced myself as the wound care nurse and he asked questions about what I was going to do to him. I explained that I needed to look at the wound first to decide how to proceed with the wound care. He stated, \" What makes you think that I am willing to trust anything that anyone says when I have not even been fed today. I got back from dialysis and my meal is still not delivered\". I asked if he would be willing to let me look at the wound and asked him how the wound occurred, how long he has had it and he stated, \"my sister will be here in about 15 - 20 minutes and she can tell you all about it. \". This patient has not even let the nurses on this unit do any skin assessment since his arrival to the hospital yesterday so no dressing change has been done. We do not know what kind of dressing he has in place or what kind of wound he has and how big the wound is or how much it is draining. Nursing notified Dr. Mimi Pritchett of patient uncooperativeness. Pt. Has a history of this. He is here to have a AV fistula created on Friday (tomorrow). He completed dialysis this afternoon. Patient's food arrived as we were talking and he got on the phone to complain because his tray was not correct. Plan: I will attempt to see this patient tomorrow after his surgery.   
Carlos Gregory, RN, BSN, [employer]

## 2018-08-03 ENCOUNTER — TELEPHONE (OUTPATIENT)
Dept: INTERNAL MEDICINE CLINIC | Age: 78
End: 2018-08-03

## 2018-08-03 NOTE — PROGRESS NOTES
Surgery Patient refuses to take meds, have vital signs taken, or allow me to examine him. Fortunately he has allowed dialysis here in the hospital. 
 
I spoke with patient's wife. She says this is typical behavior for him. He is willing to work with his outpatient physicians. We will have to cancel surgery for today and discharge patient.  
 
Constance [facility] MD

## 2018-08-03 NOTE — DISCHARGE INSTRUCTIONS
Continue to go to dialysis as usual.    Follow up with your nephrologist Dr Hannah Keen.     John Moseley MD

## 2018-08-03 NOTE — PROGRESS NOTES
This patient was in room @2182 for the evening. He sat in his personal wheelchair for the duration of the pm shift, and intermittently made phone calls on his personal cell phone. Mr. Anabel Ferrara refused to allow the RN to perform an exam, with exception of listening to his heart. While attempting to listen to his lungs, the patient got agitated and refused any further care. The patient did not allow anyone to monitor his VS and he had no tele or IV in place. This morning, around 0830, the patient's sister arrived to take the patient home - stating he is refusing care and \"just wants to go home. \" This RN did clarify that they did not want dialysis today either. The dayshift RN was notified, and asked for the RN Charge Margie Cardozo) to be informed by myself. At approximately 0840, 08/03/2018, this patient was released with DC by the RN Charge and left the unit via his wheelchair, with his sister.

## 2018-08-03 NOTE — PROGRESS NOTES
CM attempted to meet with pt in room to discuss discharge plan and needs. CM arrived to find patient dressed in wheelchair being pushed out of room by sister. Charge nurse in room attempting to give discharge instructions. Pt sister continuing to push pt out of room at pt demands. CM informed pt and sister that appt was pending for pt at PCP, if not staying for information, need to call and get appt. Informed pt sister and pt to notify Dialysis center of discharge and need for chair tomorrow. Pt not responding to information. Sister receptive and stated she would do both items when they got home. CM specialist emailed CM and informed that PCP office will be calling to schedule appt with pt for follow up. Care Management Interventions PCP Verified by CM: Yes (dr. Rosy Aragon) Mode of Transport at Discharge: Other (see comment) (sister was here and transported pt to home by car at discharge this am) Transition of Care Consult (CM Consult): Discharge Planning Discharge Durable Medical Equipment: No (none ordered new) Physical Therapy Consult: No 
Occupational Therapy Consult: No 
Speech Therapy Consult: No 
Current Support Network: Lives with Caregiver (Presently pt lives with sister at [address] [place] [phone], ) Confirm Follow Up Transport: Family Plan discussed with Pt/Family/Caregiver: Yes Discharge Location Discharge Placement: Home REN Trotter, RN Care Manager [facility] 
[phone]

## 2018-08-03 NOTE — PROGRESS NOTES
Patients sister at bedside with patient demanding to leave, dc orders written by Dr. Rere Conway, paperwork printed and reviewed while patient in threshold of door. Sister shaking her head at patient, sister and patient report he has no IV access

## 2018-08-03 NOTE — TELEPHONE ENCOUNTER
Patient needs a call back in reference to getting a Hospital Follow up appt for [id number] discharge today 8/3/18. No Availability. Please call.  Thank you

## 2018-08-03 NOTE — PROGRESS NOTES
RN Charge (Berenice) notified that this patient, Mr. Ananda Hodges has been refusing any and all care for this pm shift. He has not had any VS this evening, has no IV, refused all medications and kicked multiple staff out of his room.

## 2018-08-03 NOTE — PROGRESS NOTES
Family does not know home meds. Reports [facility] knows. Patient declining medications d/t paranoia of people trying to harm him

## 2018-08-06 ENCOUNTER — PATIENT OUTREACH (OUTPATIENT)
Dept: INTERNAL MEDICINE CLINIC | Age: 78
End: 2018-08-06

## 2018-08-08 NOTE — DISCHARGE SUMMARY
.  Physician Discharge Summary     Patient ID:  Christian Valencia  [id number]  78 y.o.  1940    Allergies: Albuterol; Contrast agent [iodine]; Iodinated contrast- oral and iv dye; Keflex [cephalexin]; Norvasc [amlodipine]; and Pcn [penicillins]    Admit Date: 8/1/2018    Discharge Date: 8/3/2018    * Admission Diagnoses: ESRD; Anasarca associated with disorder of kidney;ESRD (end *    * Discharge Diagnoses:    Hospital Problems as of 8/3/2018  Date Reviewed: 8/2/2018          Codes Class Noted - Resolved POA    ESRD (end stage renal disease) ([facility] ICD-10-CM: N18.6  ICD-9-CM: 585.6  8/1/2018 - Present Yes        Anasarca associated with disorder of kidney ICD-10-CM: N04.9  ICD-9-CM: 581.9  8/1/2018 - Present Yes               Admission Condition: Fair    * Discharge Condition: improved    * Procedures: * No surgery found [facility] Course:   Christian Chamorro is a 78 y.o. male admitted on 8/1/2018 with anasarca, history of ESRD on hemodialysis, history of hepatic encephalopathy. He is scheduled for surgery on 8/3/2018 for creation of arteriovenous fistula in the left arm.       Nephrologist - Dr Sharon Vanegas     The patient has history of non compliance with fluid restriction. The patient complied with ultrafiltration and hemodialysis on a daily basis while in the hospital.  However, he refused to take his medications, allow blood draws, or allow Dr Lynn Saucedo to examine several reported wounds. Because of this his surgery was cancelled and he was discharged.       Consults: Nephrology    Significant Diagnostic Studies: none    * Disposition: Home    Discharge Medications:   Discharge Medication List as of 8/3/2018  8:28 AM      CONTINUE these medications which have NOT CHANGED    Details   sevelamer carbonate (RENVELA) 800 mg tab tab Take  by mouth three (3) times daily.  Take 2 tablets by mouth with meals, Historical Med      ondansetron (ZOFRAN ODT) 4 mg disintegrating tablet Take 4 mg by mouth every six (6) hours as needed for Nausea., Historical Med      acetaminophen (MAPAP) 325 mg tablet Take 325 mg by mouth every six (6) hours as needed for Pain. Give 2 tablets or 650 mg by mouth every 6 hours as needed for pain, Historical Med      metoclopramide HCl (REGLAN) 5 mg tablet Take 5 mg by mouth Before breakfast, lunch, and dinner. Take before meals & at bedtime, Historical Med      traMADol (ULTRAM) 50 mg tablet Take 50 mg by mouth every six (6) hours as needed for Pain., Historical Med      rifAXIMin (XIFAXAN) 550 mg tablet Take 550 mg by mouth two (2) times a day. For hepatic encephalopathy, Historical Med      ammonium lactate (AMLACTIN) 12 % topical cream Apply  to affected area two (2) times a day. rub in to affected area well  Apply to both legs topically two times a day, Historical Med      Amino Acids-Protein Hydrolys (PROSOURCE NO CARB) 15-60 gram-kcal/30 mL liqd Take  by mouth. Give 30 ML by mouth two times a day, Historical Med      lactulose (CHRONULAC) 10 gram/15 mL solution Take  by mouth. Give 60 ML by mouth four times a day - do not hold - encephalopathy, Historical Med      mirtazapine (REMERON) 7.5 mg tablet Take 7.5 mg by mouth nightly., Historical Med      risperiDONE (RISPERDAL) 1 mg tablet Take  by mouth daily. , Historical Med      dulaglutide (TRULICITY) 4.23 FH/4.2 mL sub-q pen 0.5 mL by SubCUTAneous route every seven (7) days. , Normal, Disp-4 Pen, R-11      !! furosemide (LASIX) 40 mg tablet TAKE 2 TABLETS(80MG) BY MOUTH EVERY MORNING AND TAKE 1 TABLET(40MG) BY MOUTH EVERY EVENING, Normal**Patient requests 90 days supply**Disp-270 Tab, R-6      ciprofloxacin HCl (CIPRO) 250 mg tablet Take  by mouth every twelve (12) hours. , Historical Med      tamsulosin (FLOMAX) 0.4 mg capsule Take 1 Cap by mouth daily. , Historical Med, R-12      atorvastatin (LIPITOR) 40 mg tablet Take  by mouth daily. , Historical Med      OLANZapine (ZYPREXA) 5 mg tablet Take  by mouth nightly., Historical Med Lactobacillus acidophilus (BACID) cap Take 1 Cap by mouth daily. , Historical Med      carvedilol (COREG) 12.5 mg tablet Take 1 Tab by mouth two (2) times a day., Normal, Disp-60 Tab, R-11      isosorbide mononitrate ER (IMDUR) 30 mg tablet TAKE 1 TABLET BY MOUTH TWICE DAILY, Normal, Disp-60 Tab, R-11      spironolactone (ALDACTONE) 25 mg tablet Take 1 Tab by mouth daily. , Normal, Disp-30 Tab, R-11      !! furosemide (LASIX) 40 mg tablet Take 1 Tab by mouth two (2) times a day., Normal, Disp-60 Tab, R-11      glucose blood VI test strips (ASCENSIA AUTODISC VI, ONE TOUCH ULTRA TEST VI) strip Check fsbs every day, Normal, Disp-50 Strip, R-11      MEDIHONEY, HONEY, 100 % topical paste Historical Med, R-10, DAVID      lidocaine (XYLOCAINE) 2 % jelly PREM TO WOUND D UTD, Historical Med, R-6      cholecalciferol (VITAMIN D3) 1,000 unit tablet Take 5,000 Units by mouth daily. , Historical Med      MAGNESIUM GLYCINATE PO Take 266 mg by mouth daily. , Historical Med      COQ10, UBIQUINOL, PO Take 60 mg by mouth daily. , Historical Med      cyanocobalamin 1,000 mcg tablet Take 1,000 mcg by mouth daily. , Historical Med      FOLIC ACID PO Take 901 mcg by mouth., Historical Med      aspirin delayed-release 81 mg tablet Take 81 mg by mouth daily. , Historical Med       !! - Potential duplicate medications found. Please discuss with provider. STOP taking these medications       trimethoprim-sulfamethoxazole (BACTRIM DS, SEPTRA DS) 160-800 mg per tablet Comments:   Reason for Stopping:               * Follow-up Care/Patient Instructions:   Activity: Activity as tolerated  Diet: Renal Diet  Wound Care: As directed    Follow-up Information     Follow up With Details Comments Contact Info    Julieta Herrmann MD  The nurse will contact you with appointment date and time  [address] [facility]  [phone]          Follow-up tests/labs none    Signed:  Pearl Grey MD  8/8/2018  11:55 AM

## 2018-08-21 ENCOUNTER — PATIENT OUTREACH (OUTPATIENT)
Dept: INTERNAL MEDICINE CLINIC | Age: 78
End: 2018-08-21

## 2018-08-21 NOTE — PROGRESS NOTES
Due to inability to communicate with patient MUNA episode resolved at this time and no further contact scheduled.

## 2018-10-26 ENCOUNTER — OFFICE VISIT (OUTPATIENT)
Dept: INTERNAL MEDICINE CLINIC | Age: 78
End: 2018-10-26

## 2018-10-26 VITALS
OXYGEN SATURATION: 96 % | DIASTOLIC BLOOD PRESSURE: 54 MMHG | BODY MASS INDEX: 28.84 KG/M2 | TEMPERATURE: 97.5 F | SYSTOLIC BLOOD PRESSURE: 120 MMHG | HEIGHT: 71 IN | WEIGHT: 206 LBS | HEART RATE: 77 BPM

## 2018-10-26 DIAGNOSIS — F02.81 ALZHEIMER'S DEMENTIA WITH BEHAVIORAL DISTURBANCE, UNSPECIFIED TIMING OF DEMENTIA ONSET: ICD-10-CM

## 2018-10-26 DIAGNOSIS — Z99.2 ESRD (END STAGE RENAL DISEASE) ON DIALYSIS (HCC): ICD-10-CM

## 2018-10-26 DIAGNOSIS — N18.6 CONTROLLED TYPE 2 DIABETES MELLITUS WITH CHRONIC KIDNEY DISEASE ON CHRONIC DIALYSIS, WITHOUT LONG-TERM CURRENT USE OF INSULIN (HCC): Primary | ICD-10-CM

## 2018-10-26 DIAGNOSIS — E78.00 PURE HYPERCHOLESTEROLEMIA: ICD-10-CM

## 2018-10-26 DIAGNOSIS — N18.6 ESRD (END STAGE RENAL DISEASE) ON DIALYSIS (HCC): ICD-10-CM

## 2018-10-26 DIAGNOSIS — I10 ESSENTIAL HYPERTENSION: ICD-10-CM

## 2018-10-26 DIAGNOSIS — E66.3 OVERWEIGHT (BMI 25.0-29.9): ICD-10-CM

## 2018-10-26 DIAGNOSIS — E11.22 CONTROLLED TYPE 2 DIABETES MELLITUS WITH CHRONIC KIDNEY DISEASE ON CHRONIC DIALYSIS, WITHOUT LONG-TERM CURRENT USE OF INSULIN (HCC): Primary | ICD-10-CM

## 2018-10-26 DIAGNOSIS — Z99.2 CONTROLLED TYPE 2 DIABETES MELLITUS WITH CHRONIC KIDNEY DISEASE ON CHRONIC DIALYSIS, WITHOUT LONG-TERM CURRENT USE OF INSULIN (HCC): Primary | ICD-10-CM

## 2018-10-26 DIAGNOSIS — G30.9 ALZHEIMER'S DEMENTIA WITH BEHAVIORAL DISTURBANCE, UNSPECIFIED TIMING OF DEMENTIA ONSET: ICD-10-CM

## 2018-10-26 LAB — HBA1C MFR BLD HPLC: 4.5 % (ref 4.8–5.6)

## 2018-10-26 RX ORDER — ISOSORBIDE MONONITRATE 30 MG/1
TABLET, EXTENDED RELEASE ORAL
Qty: 60 TAB | Refills: 11 | Status: SHIPPED | OUTPATIENT
Start: 2018-10-26

## 2018-10-26 NOTE — PROGRESS NOTES
HISTORY OF PRESENT ILLNESS Alice Michel is a 70 y.o. male. HPI  
 
F/u DM-2 HTN and diastolic CHF, ckd 4 ( Dr Sonia Henning) Dementia. Has ESRD on HD Admitted to hospital 2 mos ago for dialysis and creation of left AVF but pt did not allow surgeon to proceed. Has AVF now in right arm will be ready to use soon Leg ulcers have healed up now 
fsbs 100's Last a1c 7.9  On trulicity Not taking lipitor Hx hepatic encephalpoathy at [facility] this year--placed in [facility] and lactulose but not taking lactulose Sees [facility] for dementia--on remeron and risperdal hs--has full time paid CG now Last OV Hx urine retention but pt family dc'd christine against medical advice from [facility] 
Has had wound care treatments from [facility] currently 
  
Here with wife and sister, just moved into [facility] after staying in [address] [place], [place] [address] Pt did not like staying in the facility Able to walk in [facility] Weight up 20 lbs, unable to lay flat at night due dyspnea Pt 's wife states he is taking lasix 40 mg bid, papers from [address] [place] [address] have him taking lasix 80 mg bid Continues to drain fluid from legsleft> right . Will be going to [facility] lymphedema clinic in about 2 weeks Patient Active Problem List  
 Diagnosis Date Noted  ESRD (end stage renal disease) ([facility] 08/01/2018  Anasarca associated with disorder of kidney 08/01/2018  Severe obesity (BMI 35.0-39.9) 07/09/2018  CKD (chronic kidney disease) stage V requiring chronic dialysis ([facility] 07/09/2018  Bilateral leg edema 07/09/2018  Dementia 02/15/2017  Anxiety 02/15/2017  Aortic stenosis  Pulmonary hypertension ([address]  Cellulitis 02/14/2017  
 HTN (hypertension) 04/15/2011  DM type 2 causing renal disease ([facility] 04/15/2011  Obesity 04/15/2011  PABLO (obstructive sleep apnea) 04/15/2011  CAD (coronary artery disease) 04/15/2011  Pure hypercholesterolemia 04/15/2011 Current Outpatient Medications Medication Sig Dispense Refill  sevelamer carbonate (RENVELA) 800 mg tab tab Take  by mouth three (3) times daily. Take 2 tablets by mouth with meals  ondansetron (ZOFRAN ODT) 4 mg disintegrating tablet Take 4 mg by mouth every six (6) hours as needed for Nausea.  acetaminophen (MAPAP) 325 mg tablet Take 325 mg by mouth every six (6) hours as needed for Pain. Give 2 tablets or 650 mg by mouth every 6 hours as needed for pain  metoclopramide HCl (REGLAN) 5 mg tablet Take 5 mg by mouth Before breakfast, lunch, and dinner. Take before meals & at bedtime  traMADol (ULTRAM) 50 mg tablet Take 50 mg by mouth every six (6) hours as needed for Pain.  rifAXIMin (XIFAXAN) 550 mg tablet Take 550 mg by mouth two (2) times a day. For hepatic encephalopathy  ammonium lactate (AMLACTIN) 12 % topical cream Apply  to affected area two (2) times a day. rub in to affected area well  Apply to both legs topically two times a day  Amino Acids-Protein Hydrolys (PROSOURCE NO CARB) 15-60 gram-kcal/30 mL liqd Take  by mouth. Give 30 ML by mouth two times a day  lactulose (CHRONULAC) 10 gram/15 mL solution Take  by mouth. Give 60 ML by mouth four times a day - do not hold - encephalopathy  mirtazapine (REMERON) 7.5 mg tablet Take 7.5 mg by mouth nightly.  risperiDONE (RISPERDAL) 1 mg tablet Take  by mouth daily.  dulaglutide (TRULICITY) 8.91 SY/0.3 mL sub-q pen 0.5 mL by SubCUTAneous route every seven (7) days. 4 Pen 11  
 furosemide (LASIX) 40 mg tablet TAKE 2 TABLETS(80MG) BY MOUTH EVERY MORNING AND TAKE 1 TABLET(40MG) BY MOUTH EVERY EVENING 270 Tab 6  ciprofloxacin HCl (CIPRO) 250 mg tablet Take  by mouth every twelve (12) hours.  tamsulosin (FLOMAX) 0.4 mg capsule Take 1 Cap by mouth daily. 12  
 atorvastatin (LIPITOR) 40 mg tablet Take  by mouth daily.  OLANZapine (ZYPREXA) 5 mg tablet Take  by mouth nightly.  Lactobacillus acidophilus (BACID) cap Take 1 Cap by mouth daily.  carvedilol (COREG) 12.5 mg tablet Take 1 Tab by mouth two (2) times a day. 60 Tab 11  
 isosorbide mononitrate ER (IMDUR) 30 mg tablet TAKE 1 TABLET BY MOUTH TWICE DAILY 60 Tab 11  
 spironolactone (ALDACTONE) 25 mg tablet Take 1 Tab by mouth daily. 30 Tab 11  
 furosemide (LASIX) 40 mg tablet Take 1 Tab by mouth two (2) times a day. (Patient taking differently: Take 40 mg by mouth two (2) times a day. PT STATED HE TAKES WHEN HE FEELS LIKE IT.) 60 Tab 11  
 glucose blood VI test strips (ASCENSIA AUTODISC VI, ONE TOUCH ULTRA TEST VI) strip Check fsbs every day 50 Strip 11  
 MEDIHONEY, HONEY, 100 % topical paste   10  
 lidocaine (XYLOCAINE) 2 % jelly PREM TO WOUND D UTD  6  
 cholecalciferol (VITAMIN D3) 1,000 unit tablet Take 5,000 Units by mouth daily.  MAGNESIUM GLYCINATE PO Take 266 mg by mouth daily.  COQ10, UBIQUINOL, PO Take 60 mg by mouth daily.  cyanocobalamin 1,000 mcg tablet Take 1,000 mcg by mouth daily.  FOLIC ACID PO Take 242 mcg by mouth.  aspirin delayed-release 81 mg tablet Take 81 mg by mouth daily. Allergies Allergen Reactions  Albuterol Other (comments)  
  patient reports that albuterol gives him phlegm and makes it harder to breathe  Contrast Agent [Iodine] Other (comments) \" Destroys kidneys\"  Iodinated Contrast- Oral And Iv Dye Other (comments) \" Destroys kidneys\"  Keflex [Cephalexin] Other (comments)  
  confusion  Norvasc [Amlodipine] Shortness of Breath  Pcn [Penicillins] Shortness of Breath Just started course of PCN and hydrocodone on Monday Lab Results Component Value Date/Time  WBC 8.4 07/17/2017 04:40 AM  
 HGB 11.8 (L) 07/17/2017 04:40 AM  
 Hemoglobin (POC) 11.9 (L) 05/01/2011 01:16 PM  
 HCT 35.8 (L) 07/17/2017 04:40 AM  
 Hematocrit (POC) 35 (L) 05/01/2011 01:16 PM  
 PLATELET 798 21/91/5725 04:40 AM  
 MCV 93.0 07/17/2017 04:40 AM  
 
Lab Results Component Value Date/Time Hemoglobin A1c 7.9 (H) 05/14/2018 12:09 PM  
 Hemoglobin A1c 6.8 (H) 12/12/2017 04:26 PM  
 Hemoglobin A1c 6.2 (H) 08/31/2017 02:53 PM  
 Glucose 157 (H) 05/14/2018 12:09 PM  
 Glucose (POC) 152 (H) 02/18/2017 11:37 AM  
 Microalb/Creat ratio (ug/mg creat.) 187.5 (H) 12/11/2015 11:24 AM  
 LDL, calculated 172 (H) 12/12/2017 04:26 PM  
 Creatinine (POC) 4.1 (H) 05/01/2011 01:16 PM  
 Creatinine 4.01 (H) 05/14/2018 12:09 PM  
  
Lab Results Component Value Date/Time Cholesterol, total 243 (H) 12/12/2017 04:26 PM  
 HDL Cholesterol 50 12/12/2017 04:26 PM  
 LDL, calculated 172 (H) 12/12/2017 04:26 PM  
 Triglyceride 105 12/12/2017 04:26 PM  
 CHOL/HDL Ratio 3.4 02/16/2017 06:56 AM  
 
Lab Results Component Value Date/Time GFR est non-AA 14 (L) 05/14/2018 12:09 PM  
 GFRNA, POC 16 (L) 05/01/2011 01:16 PM  
 GFR est AA 16 (L) 05/14/2018 12:09 PM  
 GFRAA, POC 19 (L) 05/01/2011 01:16 PM  
 Creatinine 4.01 (H) 05/14/2018 12:09 PM  
 Creatinine (POC) 4.1 (H) 05/01/2011 01:16 PM  
 BUN 97 (HH) 05/14/2018 12:09 PM  
 BUN (POC) 84 (H) 05/01/2011 01:16 PM  
 Sodium 142 05/14/2018 12:09 PM  
 Sodium (POC) 119 (LL) 05/01/2011 01:16 PM  
 Potassium 4.8 05/14/2018 12:09 PM  
 Potassium (POC) 8.7 (HH) 05/01/2011 01:16 PM  
 Chloride 97 05/14/2018 12:09 PM  
 Chloride (POC) 99 05/01/2011 01:16 PM  
 CO2 26 05/14/2018 12:09 PM  
 Magnesium 2.1 12/08/2017 12:48 PM  
 Phosphorus 3.6 02/16/2017 06:56 AM  
 PTH, Intact 90.3 (H) 02/16/2017 06:56 AM  
 
No results found for: PSA, Milady Massey, PSAR3, [id number], [id number], PSALT  
ROS Physical Exam  
Constitutional: He appears well-developed and well-nourished. No distress. Appears stated age, obese,nad HENT:  
Head: Normocephalic. Cardiovascular: Normal rate, regular rhythm and normal heart sounds. Pulmonary/Chest: Effort normal and breath sounds normal.  
Abdominal: Soft. Musculoskeletal: He exhibits no edema. Neurological: He is alert. Psychiatric: He has a normal mood and affect. Nursing note and vitals reviewed. ASSESSMENT and PLAN Diagnoses and all orders for this visit: 1. Controlled type 2 diabetes mellitus with chronic kidney disease on chronic dialysis, without long-term current use of insulin ([address] -     AMB POC HEMOGLOBIN A1C 4.6 Advised fsbs every day-monitor for hypoglycemia ? Stop trulicity? 2. Essential hypertension 
-     isosorbide mononitrate ER (IMDUR) 30 mg tablet; TAKE 1 TABLET BY MOUTH TWICE DAILY Controlled on HD 3. ESRD (end stage renal disease) on dialysis ([address] Tolerating well  
 shoujld be able to use AVF in near future 4. Alzheimer's dementia with behavioral disturbance, unspecified timing of dementia onset Mod-severe Full time paid CG at home F/u psych MD 
5. Pure hypercholesterolemia 
 advsied to restart lipitor but pt declines 6. Overweight (BMI 25.0-29. 9) Improved with fluid removal from HD I have reviewed/discussed the above normal BMI with the patient. I have recommended the following interventions: dietary management education, guidance, and counseling . Sharon Henriquez 7. Hx Hepatic encephalopathy On xifaxan? ? Request records from [facility]-no hx liver cirrhosis Follow-up Disposition: 
Return in about 6 months (around 4/26/2019) for dementia dm-2 htn hld esrd.